# Patient Record
Sex: FEMALE | Race: OTHER | HISPANIC OR LATINO | Employment: PART TIME | URBAN - METROPOLITAN AREA
[De-identification: names, ages, dates, MRNs, and addresses within clinical notes are randomized per-mention and may not be internally consistent; named-entity substitution may affect disease eponyms.]

---

## 2019-02-15 ENCOUNTER — OFFICE VISIT (OUTPATIENT)
Dept: FAMILY MEDICINE CLINIC | Facility: CLINIC | Age: 44
End: 2019-02-15

## 2019-02-15 VITALS
SYSTOLIC BLOOD PRESSURE: 158 MMHG | HEART RATE: 84 BPM | HEIGHT: 63 IN | BODY MASS INDEX: 51.91 KG/M2 | TEMPERATURE: 98.6 F | RESPIRATION RATE: 18 BRPM | WEIGHT: 293 LBS | DIASTOLIC BLOOD PRESSURE: 98 MMHG

## 2019-02-15 DIAGNOSIS — I10 ESSENTIAL HYPERTENSION: ICD-10-CM

## 2019-02-15 DIAGNOSIS — R63.1 POLYDIPSIA: ICD-10-CM

## 2019-02-15 DIAGNOSIS — R35.0 FREQUENCY OF URINATION AND POLYURIA: ICD-10-CM

## 2019-02-15 DIAGNOSIS — R01.1 HEART MURMUR: ICD-10-CM

## 2019-02-15 DIAGNOSIS — E66.01 CLASS 3 SEVERE OBESITY DUE TO EXCESS CALORIES WITH SERIOUS COMORBIDITY AND BODY MASS INDEX (BMI) OF 50.0 TO 59.9 IN ADULT (HCC): ICD-10-CM

## 2019-02-15 DIAGNOSIS — J30.9 ALLERGIC RHINITIS, UNSPECIFIED SEASONALITY, UNSPECIFIED TRIGGER: ICD-10-CM

## 2019-02-15 DIAGNOSIS — R53.82 CHRONIC FATIGUE: ICD-10-CM

## 2019-02-15 DIAGNOSIS — R35.89 FREQUENCY OF URINATION AND POLYURIA: ICD-10-CM

## 2019-02-15 DIAGNOSIS — R06.83 SNORING: ICD-10-CM

## 2019-02-15 DIAGNOSIS — M79.89 LEG SWELLING: ICD-10-CM

## 2019-02-15 DIAGNOSIS — Z00.01 ENCOUNTER FOR WELL ADULT EXAM WITH ABNORMAL FINDINGS: Primary | ICD-10-CM

## 2019-02-15 DIAGNOSIS — Z12.39 SCREENING BREAST EXAMINATION: ICD-10-CM

## 2019-02-15 DIAGNOSIS — M72.2 PLANTAR FASCIITIS OF LEFT FOOT: ICD-10-CM

## 2019-02-15 DIAGNOSIS — L82.1 DERMATOSIS PAPULOSA NIGRA: ICD-10-CM

## 2019-02-15 LAB — SL AMB POCT GLUCOSE BLD: 174

## 2019-02-15 PROCEDURE — 82948 REAGENT STRIP/BLOOD GLUCOSE: CPT | Performed by: FAMILY MEDICINE

## 2019-02-15 PROCEDURE — 3725F SCREEN DEPRESSION PERFORMED: CPT | Performed by: FAMILY MEDICINE

## 2019-02-15 PROCEDURE — 99213 OFFICE O/P EST LOW 20 MIN: CPT | Performed by: FAMILY MEDICINE

## 2019-02-15 PROCEDURE — 99386 PREV VISIT NEW AGE 40-64: CPT | Performed by: FAMILY MEDICINE

## 2019-02-15 RX ORDER — LISINOPRIL 10 MG/1
10 TABLET ORAL DAILY
Qty: 90 TABLET | Refills: 2 | Status: SHIPPED | OUTPATIENT
Start: 2019-02-15 | End: 2019-03-15 | Stop reason: ALTCHOICE

## 2019-02-15 RX ORDER — CETIRIZINE HYDROCHLORIDE 10 MG/1
10 TABLET ORAL
Qty: 90 TABLET | Refills: 2 | Status: SHIPPED | OUTPATIENT
Start: 2019-02-15 | End: 2019-09-12 | Stop reason: SDUPTHER

## 2019-02-15 RX ORDER — FLUTICASONE PROPIONATE 50 MCG
1 SPRAY, SUSPENSION (ML) NASAL DAILY
Qty: 1 BOTTLE | Refills: 4 | Status: SHIPPED | OUTPATIENT
Start: 2019-02-15 | End: 2019-08-08 | Stop reason: SDUPTHER

## 2019-02-15 NOTE — PROGRESS NOTES
Amy Browne 1975 female MRN: [id number]    [facility] Medicine Well Adult Female Exam    ASSESSMENT/PLAN  Problem List Items Addressed This Visit        Respiratory    Allergic rhinitis     -Start flonase intranasal daily  -Start zyrtec PO QHS PRN for congestion         Relevant Medications    fluticasone (FLONASE) 50 mcg/act nasal spray    cetirizine (ZyrTEC) 10 mg tablet       Cardiovascular and Mediastinum    Essential hypertension     -Previously on lisinopril-HCTZ, currently untreated HTN  -BP today 158/98, above goal  -HTN may be related to undetected ANGE; PSG as noted below  -Will trial restart of lisinopril 10 mg QD & re-check BP at next visit  -Due to frequent urination, defer restarting HCTZ at this time  -Advised patient on symptoms of hypotension & severe HTN  -Limit salt-intake & caffeine in diet  -Discussed importance of treating HTN to prevent ASCVD, CVA         Relevant Medications    lisinopril (ZESTRIL) 10 mg tablet    Other Relevant Orders    Comprehensive metabolic panel    Lipid panel    TSH, 3rd generation with Free T4 reflex    Echo complete with contrast if indicated       Musculoskeletal and Integument    Dermatosis papulosa nigra     -Benign DPNs (SKs) over B/L cheeks, with subjective intermittent pruritus of L-sided DPNs; no abnormalities on physical exam  -Referral to Dermatology for possible electrosurgical removal/hyfrecation of irritated SKs, per patient request         Relevant Orders    Ambulatory referral to Dermatology    Plantar fasciitis of left foot     -Symptomatic with first steps in a m , tenderness to heel palpation & pain elicited on dorsiflexion suggestive of plantar fasciitis  -At risk for plantar fasciitis due to obesity, limited physical activity  -Discussed rest, shoe inserts, stretching exercises as options  -Refer to physical therapy at this time for further management  -Patient information on plantar fasciitis provided by AVS         Relevant Orders Ambulatory referral to Physical Therapy       Other    Polydipsia     -Polydipsia, polyuria, & frequent urination without dysuria in the setting of a history of pre-DM, obesity, HTN concerning for T2DM  -However, spot FSG <200 today (174), not above threshold for Dx  -Will obtain fasting glucose via CMP to further screen for T2DM  -Increased thirst may be due to irritation from post-nasal drip   -Management of allergic rhinitis via flonase and zyrtec  -Encouraged adequate fluid intake to reduce thirst sensation         Frequency of urination and polyuria     -Screening for T2DM via spot FSG negative as noted above  -Denies symptoms of dysuria, no suprapubic discomfort on exam  -Defer restarting HCTZ for HTN as noted above  -Patient is a  with  x2, denies UI but it is possible that urinary frequency is 2/2 pelvic floor dysfunction  -If symptoms persist, refer to  [facility] PT         Relevant Orders    POCT blood glucose (Completed)    Chronic fatigue     -Unclear etiology, but likely due to deconditioning  -Possible ANGE may be playing a role, PSG as noted below  -Obtain bloodwork to exclude organic causes of fatigue  -CBC to assess for anemia  -TSH to evaluate for thyroid dysfunction  -Vitamin D level         Relevant Orders    CBC and differential    TSH, 3rd generation with Free T4 reflex    Vitamin D 25 hydroxy    Snoring     -[place] Sleepiness Score WNL, but STOP-BANG score 4 today  -Mallampati III-IV on exam, concurrent HTN & morbid obesity  -Referral to Sleep Medicine for further evaluation & possible PSG         Relevant Orders    Ambulatory referral to Sleep Medicine    Leg swelling     -Possibly dependent edema due to limited physical activity due to morbid obesity, especially since it improves with elevation of legs  -Discussed compression stockings as an option, in addition to elevation of legs (above the level of the heart if possible)  -No chronic skin changes suggestive of venous insufficiency or varicose veins on exam today, but cannot exclude as etiologies  -Echo ordered as noted below to exclude CHF         Relevant Orders    Echo complete with contrast if indicated    Heart murmur     -Denies symptoms of ischemia or CHF, but admits to leg swelling  -However, 3/6 systolic murmur heard on exam today  -Loudest over LLSB, may be MVR, especially given pedal edema  -Patient has comorbid obesity and HTN  -Will obtain echo at this time to assess for valvular abnormalities         Relevant Orders    Echo complete with contrast if indicated    Class 3 severe obesity due to excess calories with serious comorbidity and body mass index (BMI) of 50 0 to 59 9 in adult [facility])     -Improve diet by limiting fat, carb, & calorie intake  -Discussed weight reduction via diet & exercise  -Will discuss consideration of referral to Bariatric Surgery at future visit, due to patient's BMI 52 75 today  -Obtain TSH w/ reflex T4  -Fasting CMP to assess FBS for T2DM screening  -Lipid panel to assess for HLD         Relevant Orders    Comprehensive metabolic panel    Lipid panel    TSH, 3rd generation with Free T4 reflex      Other Visit Diagnoses     Encounter for well adult exam with abnormal findings    -  Primary    Screening breast examination        Relevant Orders    Mammo screening bilateral w cad          Return in 1 month to discuss findings of lab & imaging studies  Future Appointments   Date Time Provider Lou Villalobos   3/15/2019  8:50 AM Stephany Pacheco MD [facility]          SUBJECTIVE  CC: Physical Exam      HPI:  Kemal Florez is a 37 y o  female who presents to establish care for an adult well exam  She moved from [place] last year and has her records with her for review today  · Pre-DM, polyuria, polydipsia: She has a hx of pre-DM with HbA1C 5 93% 7/2012  Reports frequent urination without dysuria, and increased thirst as well   Appetite is normal otherwise    · HTN/leg swelling: Hx of HTN previously taking lisinopril-HCTZ 10-12 5 mg QD but off for many months  Denies chest pain/pressure, SOBOE, orthopnea, PND, diaphoresis, vision changes, dizziness, syncope  Does have B/L pedal edema, that does worsens as the day goes on, but does seem to improve with elevation of her legs  BP today 158/98  · HM: Needs mammogram, last mammogram 12/18/15, showed R breast upper quadrant nodular density, but had US 2016, showed nodular density was BI-RADs 3 (probably benign)  12/8/15 pap smear normal cytology with high-risk HPV negative  · OBGYN hx: , children are 23 yrs, 25, yrs, & 15 yrs old; only 15 yr old in [place], others in [place]  Had tubal ligation & 1 C/S  Menses were always monthly in the past, but over past few months have been every 1-2 months, now with some cramps & more bleeding, but not with every cycle  Mother was in 45s for menopause  Unmarried, not sexually active, no contraception 2/2 BTL  · Allergy congestion: Chronic nasal congestion and sinus pressure, and hard to breathe at night, a lot of clear-colored mucus from nose    · Heel pain: L foot plantar surface, worse with the first steps in the morning, sharp pain that gets better as the morning goes on  Has not tried any interventions to date  · L sided face skin lesions: Dark DPNs over B/L cheeks and neck which are usually not bothersome to her, but on her L-sided face they can be itchy at times, despite applying lotion to her face  Review of Systems: As noted in HPI      Historical Information   The patient history was reviewed as follows:    Past Medical History:   Diagnosis Date    Hypertension      Past Surgical History:   Procedure Laterality Date     SECTION       Family History   Problem Relation Age of Onset    Hypertension Mother     Diabetes Mother     Hypertension Father     Diabetes Father     Cancer Maternal Grandmother     Diabetes Maternal Grandfather       Social History   Social History Substance and Sexual Activity   Alcohol Use Never    Frequency: Never     Social History     Substance and Sexual Activity   Drug Use Never     Social History     Tobacco Use   Smoking Status Never Smoker   Smokeless Tobacco Never Used       Medications:   No current outpatient medications on file  No Known Allergies    OBJECTIVE    Vitals:   Vitals:    02/15/19 0845   BP: 158/98   BP Location: Left arm   Patient Position: Sitting   Cuff Size: Large   Pulse: 84   Resp: 18   Temp: 98 6 °F (37 °C)   TempSrc: Tympanic   Weight: 133 kg (294 lb)   Height: 5' 2 6" (1 59 m)     Physical Exam   Constitutional: She is oriented to person, place, and time  She appears well-developed and well-nourished  No distress  HENT:   Head: Normocephalic and atraumatic  Mouth/Throat: Oropharynx is clear and moist    Mallampati III-IV, boggy nasal mucosa B/L, cobblestoning of posterior pharynx; B/L TMs normal   Eyes: Pupils are equal, round, and reactive to light  Conjunctivae and EOM are normal  Right eye exhibits no discharge  Left eye exhibits no discharge  Neck: Neck supple  No thyromegaly present  37 cm circumference   Cardiovascular: Normal rate, regular rhythm and intact distal pulses  3/6 systolic murmur heard in all areas but loudest over LLSB   Pulmonary/Chest: Effort normal and breath sounds normal    Abdominal: Soft  Bowel sounds are normal    Musculoskeletal: Normal range of motion  She exhibits edema  B/L 1+ pitting pedal edema; plantar surfaces of B/L feet grossly normal in appearance, mild tenderness over L plantar heel to palpation and pain elicited with dorsiflexion of L foot   Neurological: She is alert and oriented to person, place, and time  Skin: Capillary refill takes less than 2 seconds  Multiple hyperpigmented raised fleshy papules over B/L cheeks and neck (dermatosis papulosa nigra)   Psychiatric: She has a normal mood and affect  Her behavior is normal    Vitals reviewed       POCT blood glucose    2/15/19 10:03 AM   GLUCOSE             STOP-BANG Score: 4 (High-risk for ANGE)    [place] Sleepiness Scale Score: 6 (Unlikely excessive daytime sleepiness)        Jessi Solorzano MD, PGY-2  [address]   2/15/2019

## 2019-02-15 NOTE — PATIENT INSTRUCTIONS
Howard Castañeda, please call to schedule the Echo, mammogram, Physical Therapy, Dermatology and Sleep Medicine appointments at your earliest convenience  Go the lab for fasting bloodwork so we can test you for thyroid problems, diabetes, and check kidney and liver function  Please come back in 1 month so we can re-check your blood pressure, and go over all of the results  Plantar Fasciitis   AMBULATORY CARE:   Plantar fasciitis  is swelling of the plantar fascia  The plantar fascia is a band of fibers that connect your heel bone to the front of your foot  It helps support the arch of your foot and absorbs shock  Plantar fasciitis is caused by small tears in the plantar fascia  Over time, the tears cause swelling and irritation  Signs and symptoms:   · Pain near your heel, especially first thing in the morning    · Pain with prolonged standing, sitting, or walking    · Redness, swelling, or warmth over the injured part of your foot  Contact your healthcare provider or podiatrist if:   · Your pain and swelling increase  · You develop knee, hip, or back pain  · You have questions or concerns about your condition or care  Treatment  may include any of the following:  · Medicines  may be given to decrease swelling and pain  Steroids may be injected into your heel to decrease swelling and pain  · Shoe inserts, splints, or tape  help support your foot and decrease stress on your plantar fascia  A night splint may help stretch your plantar fascia while you sleep  · Stretches and exercises  can help decrease pain and swelling  They can also help strengthen the muscles that support your heel and foot  · Extracorporeal shockwave therapy (ESWT)  uses sound waves to decrease swelling of the plantar fascia  ESWT may be done if other treatments do not work  · Surgery  is rarely needed to separate the plantar fascia from your heel  Self-care:   · Wear your splint or shoe inserts as directed    You may need to wear a splint at night to keep your foot stretched while you sleep  This will help prevent sharp pain first thing in the morning  Shoe inserts will help decrease stress on your plantar fascia when you walk or exercise  · Rest as directed  Rest as much as possible to decrease swelling and prevent more damage  Ask your healthcare provider when you can return to your normal activities  · Apply ice on your plantar fascia  Ice helps prevent tissue damage and decreases swelling and pain  Fill a water bottle with water and freeze it  Wrap a towel around the bottle or cover it with a pillow case  Roll the water bottle under your foot for 10 minutes in the morning and after work  · Massage your plantar fascia as directed  This may help decrease swelling and pain  Roll a golf ball under your foot for 10 minutes  Repeat 3 times each day  · Go to physical therapy as directed  A physical therapist teaches you exercises to help improve movement and strength, and to decrease pain  Prevent plantar fasciitis:   · Maintain a healthy weight  This will help decrease stress on your feet  Ask your healthcare provider how much you should weigh  Ask him to help you create a weight loss plan if you are overweight  · Do low-impact exercises  Low-impact exercises decrease stress on your plantar fascia  Examples include swimming or bicycling  · Start new activities slowly  Increase the intensity and time gradually  · Wear shoes that fit well and support your arch  Replace your shoes before the padding or shock absorption wears out  Do not walk or  bare feet or sandals for long periods of time  · Stretch before you exercise  Ask your healthcare provider how to stretch your plantar fascia and calf muscles  Follow up with your healthcare provider or podiatrist as directed:  Write down your questions so you remember to ask them during your visits     © 2017 [employer] Information is for End User's use only and may not be sold, redistributed or otherwise used for commercial purposes  All illustrations and images included in CareNotes® are the copyrighted property of [employer]  or [employer]  The above information is an  only  It is not intended as medical advice for individual conditions or treatments  Talk to your doctor, nurse or pharmacist before following any medical regimen to see if it is safe and effective for you  Sleep Apnea   AMBULATORY CARE:   Sleep apnea  is also called obstructive sleep apnea  It is a condition that causes you to stop breathing for 10 seconds or more while you are sleeping  During normal sleep, your throat is kept open by muscles that let air pass through easily  Sleep apnea causes the muscles and tissues around your throat to relax and block air from passing through  Sleep apnea may happen many times while you are asleep  Common symptoms include the following:   · No signs of breathing for 10 seconds or more while you sleep    · Snoring loudly, snorting, gasping or choking while you sleep, and waking up suddenly because of these    · A hard time thinking, remembering things, or focusing on your tasks the following day    · Headache or nausea    · Bedwetting or waking up often during the night to urinate    · Feeling sleepy, slow, and tired during the day  Seek care immediately if:   · You have chest pain or trouble breathing  Contact your healthcare provider if:   · You feel tired or depressed  · You have trouble staying awake during the day  · You have trouble thinking clearly  · You have questions or concerns about your condition or care  Treatment for sleep apnea  includes using a continuous positive airway pressure (CPAP) machine to keep your airway open during sleep  A mask is placed over your nose and mouth, or just your nose   The mask is hooked to the CPAP machine that blows a gentle stream of air into the mask when you breathe  This helps keep your airway open so you can breathe more regularly  Extra oxygen may be given to you through the machine  You may be given a mouth device  It looks like a mouth guard or dental retainer and stops your tongue and mouth tissues from blocking your throat while you sleep  Surgery may be needed to remove extra tissues that block your mouth, throat, or nose  Manage sleep apnea:   · Do not smoke  Nicotine and other chemicals in cigarettes and cigars can cause lung damage  Ask your healthcare provider for information if you currently smoke and need help to quit  E-cigarettes or smokeless tobacco still contain nicotine  Talk to your healthcare provider before you use these products  · Do not drink alcohol or take sedative medicine before you go to sleep  Alcohol and sedatives can relax the muscles and tissues around your throat  This can block the airflow to your lungs  · Maintain a healthy weight  Excess tissue around your throat may restrict your breathing  Ask your healthcare provider for information if you need to lose weight  · Sleep on your side or use pillows designed to prevent sleep apnea  This prevents your tongue or other tissues from blocking your throat  You can also raise the head of your bed  Follow up with your healthcare provider as directed:  Write down your questions so you remember to ask them during your visits  © 2017 [address] [employer] Information is for End User's use only and may not be sold, redistributed or otherwise used for commercial purposes  All illustrations and images included in CareNotes® are the copyrighted property of [employer] , [employer]  or [employer]  The above information is an  only  It is not intended as medical advice for individual conditions or treatments  Talk to your doctor, nurse or pharmacist before following any medical regimen to see if it is safe and effective for you

## 2019-02-16 PROBLEM — E66.813 CLASS 3 SEVERE OBESITY DUE TO EXCESS CALORIES WITH SERIOUS COMORBIDITY AND BODY MASS INDEX (BMI) OF 50.0 TO 59.9 IN ADULT (HCC): Status: ACTIVE | Noted: 2019-02-16

## 2019-02-16 PROBLEM — L82.1 DERMATOSIS PAPULOSA NIGRA: Status: ACTIVE | Noted: 2019-02-16

## 2019-02-16 PROBLEM — R01.1 HEART MURMUR: Status: ACTIVE | Noted: 2019-02-16

## 2019-02-16 PROBLEM — I10 ESSENTIAL HYPERTENSION: Status: ACTIVE | Noted: 2019-02-16

## 2019-02-16 PROBLEM — R06.83 SNORING: Status: ACTIVE | Noted: 2019-02-16

## 2019-02-16 PROBLEM — R53.82 CHRONIC FATIGUE: Status: ACTIVE | Noted: 2019-02-16

## 2019-02-16 PROBLEM — M72.2 PLANTAR FASCIITIS OF LEFT FOOT: Status: ACTIVE | Noted: 2019-02-16

## 2019-02-16 PROBLEM — M79.89 LEG SWELLING: Status: ACTIVE | Noted: 2019-02-16

## 2019-02-16 PROBLEM — J30.9 ALLERGIC RHINITIS: Status: ACTIVE | Noted: 2019-02-16

## 2019-02-16 PROBLEM — E66.01 CLASS 3 SEVERE OBESITY DUE TO EXCESS CALORIES WITH SERIOUS COMORBIDITY AND BODY MASS INDEX (BMI) OF 50.0 TO 59.9 IN ADULT (HCC): Status: ACTIVE | Noted: 2019-02-16

## 2019-02-16 NOTE — ASSESSMENT & PLAN NOTE
-Improve diet by limiting fat, carb, & calorie intake  -Discussed weight reduction via diet & exercise  -Will discuss consideration of referral to Bariatric Surgery at future visit, due to patient's BMI 52 75 today  -Obtain TSH w/ reflex T4  -Fasting CMP to assess FBS for T2DM screening  -Lipid panel to assess for HLD

## 2019-02-16 NOTE — ASSESSMENT & PLAN NOTE
-Unclear etiology, but likely due to deconditioning  -Possible ANGE may be playing a role, PSG as noted below  -Obtain bloodwork to exclude organic causes of fatigue  -CBC to assess for anemia  -TSH to evaluate for thyroid dysfunction  -Vitamin D level

## 2019-02-16 NOTE — ASSESSMENT & PLAN NOTE
-Possibly dependent edema due to limited physical activity due to morbid obesity, especially since it improves with elevation of legs  -Discussed compression stockings as an option, in addition to elevation of legs (above the level of the heart if possible)  -No chronic skin changes suggestive of venous insufficiency or varicose veins on exam today, but cannot exclude as etiologies  -Echo ordered as noted below to exclude CHF

## 2019-02-16 NOTE — ASSESSMENT & PLAN NOTE
-Symptomatic with first steps in a m , tenderness to heel palpation & pain elicited on dorsiflexion suggestive of plantar fasciitis  -At risk for plantar fasciitis due to obesity, limited physical activity  -Discussed rest, shoe inserts, stretching exercises as options  -Refer to physical therapy at this time for further management  -Patient information on plantar fasciitis provided by KRYSTIN

## 2019-02-16 NOTE — ASSESSMENT & PLAN NOTE
-Screening for T2DM via spot FSG negative as noted above  -Denies symptoms of dysuria, no suprapubic discomfort on exam  -Defer restarting HCTZ for HTN as noted above  -Patient is a  with  x2, denies UI but it is possible that urinary frequency is 2/2 pelvic floor dysfunction  -If symptoms persist, refer to  Women's Pel[facility] PT

## 2019-02-16 NOTE — ASSESSMENT & PLAN NOTE
-Previously on lisinopril-HCTZ, currently untreated HTN  -BP today 158/98, above goal  -HTN may be related to undetected ANGE; PSG as noted below  -Will trial restart of lisinopril 10 mg QD & re-check BP at next visit  -Due to frequent urination, defer restarting HCTZ at this time  -Advised patient on symptoms of hypotension & severe HTN  -Limit salt-intake & caffeine in diet  -Discussed importance of treating HTN to prevent ASCVD, CVA

## 2019-02-16 NOTE — ASSESSMENT & PLAN NOTE
-Polydipsia, polyuria, & frequent urination without dysuria in the setting of a history of pre-DM, obesity, HTN concerning for T2DM  -However, spot FSG <200 today (174), not above threshold for Dx  -Will obtain fasting glucose via CMP to further screen for T2DM  -Increased thirst may be due to irritation from post-nasal drip   -Management of allergic rhinitis via flonase and zyrtec  -Encouraged adequate fluid intake to reduce thirst sensation

## 2019-02-16 NOTE — ASSESSMENT & PLAN NOTE
-Denies symptoms of ischemia or CHF, but admits to leg swelling  -However, 3/6 systolic murmur heard on exam today  -Loudest over LLSB, may be MVR, especially given pedal edema  -Patient has comorbid obesity and HTN  -Will obtain echo at this time to assess for valvular abnormalities

## 2019-02-16 NOTE — ASSESSMENT & PLAN NOTE
-Benign DPNs (SKs) over B/L cheeks, with subjective intermittent pruritus of L-sided DPNs; no abnormalities on physical exam  -Referral to Dermatology for possible electrosurgical removal/hyfrecation of irritated SKs, per patient request

## 2019-02-18 ENCOUNTER — APPOINTMENT (OUTPATIENT)
Dept: LAB | Age: 44
End: 2019-02-18
Payer: COMMERCIAL

## 2019-02-18 DIAGNOSIS — R53.82 CHRONIC FATIGUE: ICD-10-CM

## 2019-02-18 DIAGNOSIS — I10 ESSENTIAL HYPERTENSION: ICD-10-CM

## 2019-02-18 DIAGNOSIS — E66.01 CLASS 3 SEVERE OBESITY DUE TO EXCESS CALORIES WITH SERIOUS COMORBIDITY AND BODY MASS INDEX (BMI) OF 50.0 TO 59.9 IN ADULT (HCC): ICD-10-CM

## 2019-02-18 LAB
25(OH)D3 SERPL-MCNC: 13.8 NG/ML (ref 30–100)
ALBUMIN SERPL BCP-MCNC: 3.1 G/DL (ref 3.5–5)
ALP SERPL-CCNC: 76 U/L (ref 46–116)
ALT SERPL W P-5'-P-CCNC: 21 U/L (ref 12–78)
ANION GAP SERPL CALCULATED.3IONS-SCNC: 6 MMOL/L (ref 4–13)
AST SERPL W P-5'-P-CCNC: 13 U/L (ref 5–45)
BASOPHILS # BLD AUTO: 0.04 THOUSANDS/ΜL (ref 0–0.1)
BASOPHILS NFR BLD AUTO: 0 % (ref 0–1)
BILIRUB SERPL-MCNC: 0.42 MG/DL (ref 0.2–1)
BUN SERPL-MCNC: 9 MG/DL (ref 5–25)
CALCIUM SERPL-MCNC: 8.7 MG/DL (ref 8.3–10.1)
CHLORIDE SERPL-SCNC: 103 MMOL/L (ref 100–108)
CHOLEST SERPL-MCNC: 165 MG/DL (ref 50–200)
CO2 SERPL-SCNC: 29 MMOL/L (ref 21–32)
CREAT SERPL-MCNC: 0.51 MG/DL (ref 0.6–1.3)
EOSINOPHIL # BLD AUTO: 0.14 THOUSAND/ΜL (ref 0–0.61)
EOSINOPHIL NFR BLD AUTO: 1 % (ref 0–6)
ERYTHROCYTE [DISTWIDTH] IN BLOOD BY AUTOMATED COUNT: 15.1 % (ref 11.6–15.1)
GFR SERPL CREATININE-BSD FRML MDRD: 116 ML/MIN/1.73SQ M
GLUCOSE P FAST SERPL-MCNC: 121 MG/DL (ref 65–99)
HCT VFR BLD AUTO: 39.2 % (ref 34.8–46.1)
HDLC SERPL-MCNC: 37 MG/DL (ref 40–60)
HGB BLD-MCNC: 11.7 G/DL (ref 11.5–15.4)
IMM GRANULOCYTES # BLD AUTO: 0.02 THOUSAND/UL (ref 0–0.2)
IMM GRANULOCYTES NFR BLD AUTO: 0 % (ref 0–2)
LDLC SERPL CALC-MCNC: 100 MG/DL (ref 0–100)
LYMPHOCYTES # BLD AUTO: 2.49 THOUSANDS/ΜL (ref 0.6–4.47)
LYMPHOCYTES NFR BLD AUTO: 24 % (ref 14–44)
MCH RBC QN AUTO: 25.9 PG (ref 26.8–34.3)
MCHC RBC AUTO-ENTMCNC: 29.8 G/DL (ref 31.4–37.4)
MCV RBC AUTO: 87 FL (ref 82–98)
MONOCYTES # BLD AUTO: 0.46 THOUSAND/ΜL (ref 0.17–1.22)
MONOCYTES NFR BLD AUTO: 5 % (ref 4–12)
NEUTROPHILS # BLD AUTO: 7.11 THOUSANDS/ΜL (ref 1.85–7.62)
NEUTS SEG NFR BLD AUTO: 70 % (ref 43–75)
NONHDLC SERPL-MCNC: 128 MG/DL
NRBC BLD AUTO-RTO: 0 /100 WBCS
PLATELET # BLD AUTO: 291 THOUSANDS/UL (ref 149–390)
PMV BLD AUTO: 11.8 FL (ref 8.9–12.7)
POTASSIUM SERPL-SCNC: 4 MMOL/L (ref 3.5–5.3)
PROT SERPL-MCNC: 8.2 G/DL (ref 6.4–8.2)
RBC # BLD AUTO: 4.52 MILLION/UL (ref 3.81–5.12)
SODIUM SERPL-SCNC: 138 MMOL/L (ref 136–145)
TRIGL SERPL-MCNC: 142 MG/DL
TSH SERPL DL<=0.05 MIU/L-ACNC: 1.29 UIU/ML (ref 0.36–3.74)
WBC # BLD AUTO: 10.26 THOUSAND/UL (ref 4.31–10.16)

## 2019-02-18 PROCEDURE — 36415 COLL VENOUS BLD VENIPUNCTURE: CPT

## 2019-02-18 PROCEDURE — 80053 COMPREHEN METABOLIC PANEL: CPT

## 2019-02-18 PROCEDURE — 80061 LIPID PANEL: CPT

## 2019-02-18 PROCEDURE — 85025 COMPLETE CBC W/AUTO DIFF WBC: CPT

## 2019-02-18 PROCEDURE — 84443 ASSAY THYROID STIM HORMONE: CPT

## 2019-02-18 PROCEDURE — 82306 VITAMIN D 25 HYDROXY: CPT

## 2019-03-15 ENCOUNTER — OFFICE VISIT (OUTPATIENT)
Dept: FAMILY MEDICINE CLINIC | Facility: CLINIC | Age: 44
End: 2019-03-15

## 2019-03-15 VITALS
TEMPERATURE: 98.9 F | SYSTOLIC BLOOD PRESSURE: 160 MMHG | WEIGHT: 293 LBS | HEART RATE: 72 BPM | RESPIRATION RATE: 16 BRPM | DIASTOLIC BLOOD PRESSURE: 100 MMHG | HEIGHT: 63 IN | BODY MASS INDEX: 51.91 KG/M2

## 2019-03-15 DIAGNOSIS — I10 ESSENTIAL HYPERTENSION: ICD-10-CM

## 2019-03-15 DIAGNOSIS — K21.9 CHRONIC GERD: ICD-10-CM

## 2019-03-15 DIAGNOSIS — M79.89 LEG SWELLING: ICD-10-CM

## 2019-03-15 DIAGNOSIS — E11.9 TYPE 2 DIABETES MELLITUS WITHOUT COMPLICATION, WITHOUT LONG-TERM CURRENT USE OF INSULIN (HCC): Primary | ICD-10-CM

## 2019-03-15 LAB — SL AMB POCT HEMOGLOBIN AIC: 6.5 (ref ?–6.5)

## 2019-03-15 PROCEDURE — 99213 OFFICE O/P EST LOW 20 MIN: CPT | Performed by: FAMILY MEDICINE

## 2019-03-15 PROCEDURE — 83036 HEMOGLOBIN GLYCOSYLATED A1C: CPT | Performed by: FAMILY MEDICINE

## 2019-03-15 RX ORDER — RANITIDINE 150 MG/1
150 TABLET ORAL 2 TIMES DAILY
Qty: 180 TABLET | Refills: 1 | Status: SHIPPED | OUTPATIENT
Start: 2019-03-15 | End: 2019-09-05 | Stop reason: SDUPTHER

## 2019-03-15 RX ORDER — LISINOPRIL AND HYDROCHLOROTHIAZIDE 12.5; 1 MG/1; MG/1
1 TABLET ORAL DAILY
Qty: 90 TABLET | Refills: 2 | Status: SHIPPED | OUTPATIENT
Start: 2019-03-15 | End: 2019-09-12 | Stop reason: SDUPTHER

## 2019-03-15 NOTE — PATIENT INSTRUCTIONS
Start metformin 500 mg once daily for 2 weeks, and if you tolerate it without belly upset, increase to 500 mg twice daily for 4 weeks, and let me know if you have any problems with it  Please measure your blood pressure and record it on a paper to be reviewed in 1 month at the next visit  When I get your echo results, I will call you and discuss further  Please try to elevate your legs above your heart at the end of the day to reduce the swelling  I have sent the new blood pressure medication to your pharmacy, make sure you stay hydrated due to the HCTZ increasing urination  I have also sent zantac, which you can take twice a day as needed  -Try these Anti-reflux measures:    -Raise the head of the bed 4 to 6 inches   -Avoid excess coffee, tea or other caffeinated beverages   -Avoid spicy or acidic foods, avoid peppermint, chocolate   -Minimize eating within 2 hours of bedtime   -Avoid garments that fit tightly through the abdomen    Type 2 Diabetes in Adults   AMBULATORY CARE:   Type 2 diabetes  is a disease that affects how your body uses glucose (sugar)  Normally, when the blood sugar level increases, the pancreas makes more insulin  Insulin helps move sugar out of the blood so it can be used for energy  Type 2 diabetes develops because either the body cannot make enough insulin, or it cannot use the insulin correctly  After many years, your pancreas may stop making insulin     Common symptoms include the following:   · More hunger or thirst than usual     · Frequent urination     · Weight loss without trying     · Blurred vision  Call 911 if you have any of the following:   · You have any of the following signs of a stroke:      ¨ Numbness or drooping on one side of your face     ¨ Weakness in an arm or leg    ¨ Confusion or difficulty speaking    ¨ Dizziness, a severe headache, or vision loss    · You have any of the following signs of a heart attack:      ¨ Squeezing, pressure, or pain in your chest that lasts longer than 5 minutes or returns    ¨ Discomfort or pain in your back, neck, jaw, stomach, or arm     ¨ Trouble breathing    ¨ Nausea or vomiting    ¨ Lightheadedness or a sudden cold sweat, especially with chest pain or trouble breathing  Seek care immediately if:   · You have severe abdominal pain, or the pain spreads to your back  You may also be vomiting  · You have trouble staying awake or focusing  · You are shaking or sweating  · You have blurred or double vision  · Your breath has a fruity, sweet smell  · Your breathing is deep and labored, or rapid and shallow  · Your heartbeat is fast and weak  Contact your healthcare provider if:   · You are vomiting or have diarrhea  · You have an upset stomach and cannot eat the foods on your meal plan  · You feel weak or more tired than usual      · You feel dizzy, have headaches, or are easily irritated  · Your skin is red, warm, dry, or swollen  · You have a wound that does not heal      · You have numbness in your arms or legs  · You have trouble coping with your illness, or you feel anxious or depressed  · You have questions or concerns about your condition or care  Treatment for type 2 diabetes  includes keeping your blood sugar at a normal level  You must eat the right foods, and exercise regularly  You may need medicine if you cannot control your blood sugar level with nutrition and exercise  You may also need medicine to prevent heart disease, a complication of type 2 diabetes  You may  need any of the following:  · Hypoglycemic medicines or insulin  may be given to decrease the amount of sugar in your blood  · Blood pressure medicine  may be given to lower your blood pressure  Your blood pressure should be less than 140/90  · Cholesterol lowering medicine  may be given to prevent heart disease  · Antiplatelets , such as aspirin, help prevent blood clots   Take your antiplatelet medicine exactly as directed  These medicines make it more likely for you to bleed or bruise  If you are told to take aspirin, do not take acetaminophen or ibuprofen instead  · Take your medicine as directed  Contact your healthcare provider if you think your medicine is not helping or if you have side effects  Tell him or her if you are allergic to any medicine  Keep a list of the medicines, vitamins, and herbs you take  Include the amounts, and when and why you take them  Bring the list or the pill bottles to follow-up visits  Carry your medicine list with you in case of an emergency  Check your blood sugar level: You will be taught how to check a small drop of blood in a glucose monitor  You will need to check your blood sugar level at least 3 times each day if you are on insulin  Ask your healthcare provider when and how often to check during the day  If you check your blood sugar level before a meal , it should be between 80 and 130 mg/dL  If you check your blood sugar level 1 to 2 hours after a meal , it should be less than 180 mg/dL  Ask your healthcare provider if these are good goals for you  Write down your results, and show them to your healthcare provider  He may use the results to make changes to your medicine, food, and exercise schedules  If your blood sugar level is too low: Your blood sugar level is too low if it goes below 70 mg/dL  If the level is too low, eat or drink 15 grams of fast-acting carbohydrate  These are found naturally in fruits  Fast-acting carbohydrates will raise your blood sugar level quickly  Examples of 15 grams of fast-acting carbohydrate are 4 ounces (½ cup) of fruit juice or 4 ounces of regular soda  Other examples are 2 tablespoons of raisins or 3 to 4 glucose tablets  Check your blood sugar level 15 minutes later  If the level is still low (less than 100 mg/dL), eat another 15 grams of carbohydrate   When the level returns to 100 mg/dL, eat a snack or meal that contains carbohydrates  This will help prevent another drop in blood sugar  Always carefully follow your healthcare provider's instructions on how to treat low blood sugar levels  Check your feet each day for sores:  Wear shoes and socks that fit correctly  Do not trim your toenails  Ask your healthcare provider for more information about foot care  Follow your meal plan:  A dietitian will help you make a meal plan to keep your blood sugar level steady  Do not skip meals  Your blood sugar level may drop too low if you have taken diabetes medicine and do not eat  · Keep track of carbohydrates (sugar and starchy foods)  Your blood sugar level can get too high if you eat too many carbohydrates  Your dietitian will help you plan meals and snacks that have the right amount of carbohydrates  · Eat low-fat foods , such as skinless chicken and low-fat milk  · Eat less sodium (salt)  Limit high-sodium foods, such as soy sauce, potato chips, and soup  Do not add salt to food you cook  Limit your use of table salt  You should have less than 2,300 mg of sodium per day  · Eat high-fiber foods , such as vegetables, whole grain breads, and beans  · Limit alcohol  Alcohol affects your blood sugar level and can make it harder to manage your diabetes  Limit alcohol to 1 drink a day if you are a woman  Limit alcohol to 2 drinks a day if you are a man  A drink of alcohol is 12 ounces of beer, 5 ounces of wine, or 1½ ounces of liquor  Maintain a healthy weight:  Ask your healthcare provider how much you should weigh  A healthy weight can help you control your diabetes  Ask your provider to help you create a weight loss plan if you are overweight  Together you can set manageable weight loss goals  Exercise as directed:  Exercise can help keep your blood sugar level steady, decrease your risk of heart disease, and help you lose weight  Stretch before and after you exercise   Exercise for at least 150 minutes every week  Spread this amount of exercise over at least 3 days a week  Do not skip exercise more than 2 days in a row  Include muscle strengthening activities 2 to 3 days each week  Older adults should include balance training 2 to 3 times each week  Activities that help increase balance include yoga and emily chi  Work with your healthcare provider to create an exercise plan  · Check your blood sugar level before and after exercise  Healthcare providers may tell you to change the amount of insulin you take or food you eat  If your blood sugar level is high, check your blood or urine for ketones before you exercise  Do not exercise if your blood sugar level is high and you have ketones  · If your blood sugar level is less than 100 mg/dL, have a carbohydrate snack before you exercise  Examples are 4 to 6 crackers, ½ banana, 8 ounces (1 cup) of milk, or 4 ounces (½ cup) of juice  Drink water or liquids that do not contain sugar before, during, and after exercise  Ask your dietitian or healthcare provider which liquids you should drink when you exercise  · Do not sit for longer than 30 minutes  If you cannot walk around, at least stand up  This will help you stay active and keep your blood circulating  Do not smoke:  Nicotine and other chemicals in cigarettes and cigars can cause lung damage and make it more difficult to manage your diabetes  Ask your healthcare provider for information if you currently smoke and need help to quit  Do not use e-cigarettes or smokeless tobacco in place of cigarettes or to help you quit  They still contain nicotine  Check your blood pressure as directed:  Ask your healthcare provider what your blood pressure should be  Most adults with diabetes and high blood pressure should have a systolic blood pressure (first number) less than 140  Your diastolic blood pressure (second number) should be less than 90     Wear medical alert identification:  Wear medical alert jewelry or carry a card that says you have diabetes  Ask your healthcare provider where to get these items  Ask about vaccines: You have a higher risk for serious illness if you get the flu, pneumonia, or hepatitis  Ask your healthcare provider if you should get a flu, pneumonia, or hepatitis B vaccine, and when to get the vaccine  Follow up with your healthcare provider as directed: You may need to return to have your A1c checked every 3 months  You will need to return at least once each year to have your feet checked  You will need an eye exam once a year to check for retinopathy  You will also need urine tests every year to check for kidney problems  You may need tests to monitor for heart disease such as an EKG, stress test, blood pressure monitoring, and blood tests  Write down your questions so you remember to ask them during your visits  © 2017 [employer] Information is for End User's use only and may not be sold, redistributed or otherwise used for commercial purposes  All illustrations and images included in CareNotes® are the copyrighted property of [employer]  or [employer]  The above information is an  only  It is not intended as medical advice for individual conditions or treatments  Talk to your doctor, nurse or pharmacist before following any medical regimen to see if it is safe and effective for you

## 2019-03-15 NOTE — ASSESSMENT & PLAN NOTE
Lab Results   Component Value Date    HGBA1C 6 5 03/15/2019   -New diagnosis of diabetes discovered today after A1c obtained 2/2 elevated FSG (121)  -Start metformin 500 mg, but with slow titration to optimize tolerance and reduce risk of GI side effects  -Start metformin 500 mg 1 tablet QD x2 weeks, then titrate up to metformin 500 mg BID x1 month  -Goal of metformin will be 1000 mg BID, but will assess tolerance in 6 weeks at BP check visit  -Will start statin in future visit once HTN better controlled, to calculated ASCVD for strength of statin  -Obtain urine microalbumin, foot exam, Ophthalmology referral at next visit  -Discussed limiting carbohydrates in diet, weight reduction efforts, increased exercise   -Patient declines referral for medical weight management/bariatrics today

## 2019-03-15 NOTE — PROGRESS NOTES
Alyssa Cee 1975 female MRN: [id number]    Family Medicine Follow-up Visit    ASSESSMENT/PLAN  Problem List Items Addressed This Visit        Digestive    Chronic GERD     -Restarted previously prescribed (in [place]) zantac today  -Discussed Anti-reflux measures:    -Raise the head of the bed 4 to 6 inches   -Avoid excess coffee, tea or other caffeinated beverages   -Avoid spicy or acidic foods, avoid peppermint, chocolate   -Minimize eating within 2 hours of bedtime   -Avoid garments that fit tightly through the abdomen         Relevant Medications    ranitidine (ZANTAC) 150 mg tablet       Endocrine    Type 2 diabetes mellitus without complication, without long-term current use of insulin ([facility]) - Primary     Lab Results   Component Value Date    HGBA1C 6 5 03/15/2019   -New diagnosis of diabetes discovered today after A1c obtained 2/2 elevated FSG (121)  -Start metformin 500 mg, but with slow titration to optimize tolerance and reduce risk of GI side effects  -Start metformin 500 mg 1 tablet QD x2 weeks, then titrate up to metformin 500 mg BID x1 month  -Goal of metformin will be 1000 mg BID, but will assess tolerance in 6 weeks at BP check visit  -Will start statin in future visit once HTN better controlled, to calculated ASCVD for strength of statin  -Obtain urine microalbumin, foot exam, Ophthalmology referral at next visit  -Discussed limiting carbohydrates in diet, weight reduction efforts, increased exercise   -Patient declines referral for medical weight management/bariatrics today         Relevant Medications    metFORMIN (GLUCOPHAGE) 500 mg tablet    Other Relevant Orders    POCT hemoglobin A1c (Completed)       Cardiovascular and Mediastinum    Essential hypertension     -Patient still having severe-range BP despite starting lisinopril at last visit  -Patient checks BP daily (her daughter is a nurse) and SBP running 150s-160s usually  -Will add back on HCTZ to lisinopril, which patient was taking back in [place] per records  -HCTZ may also help with patient's B/L LE swelling  -Advised patient to record daily BP and bring log for review in 1 month  -Advised patient on symptoms of hypotension & severe HTN  -Limit salt-intake & caffeine in diet, minimize stress level, weight reduction  -DASH diet handout given via AVS   -Discussed importance of treating HTN to prevent ASCVD, CVA         Relevant Medications    lisinopril-hydrochlorothiazide (PRINZIDE,ZESTORETIC) 10-12 5 MG per tablet       Other    Leg swelling     -As noted at previous visit, appears to be dependent edema, as it is non-pitting and improves overnight  -However, concern for cardiac etiology 2/2 murmur on exam, possible MVR; echo scheduled 3/22/19  -HCTZ added back on for HTN, which may help with patient's swelling  -Discussed elevating legs above level of heart at the end of the day  -Also discussed compression stockings, which patient would like to defer for now  -Advised on limiting salt in diet to prevent excessive fluid retention               Follow up in 1 month for BP check  Future Appointments   Date Time Provider Lou Villalobos   3/22/2019 10:00 AM BE [facility] ECHO 2 [facility] Car [address]   3/27/2019  1:30 PM Madhavi Gardner MD [facility] Sleep Med [facility] SLEEP [facility]   4/22/2019  3:40 PM Cosme Carreno MD [facility] STAR Lizz Odell          SUBJECTIVE  CC: Follow-up      HPI:  Gui Felton is a 37 y o  female who presents for follow up to discuss lab results as well as:    · Labs: FSG elevated at 121, but CMP otherwise WNL, TSH WNL (1 290), Vit D 13 8 (low), but CBC & lipid panel WNL  Patient was on metformin in [place] for pre-diabetes, tolerated well per patient, and she is amenable to T2DM testing today with the possibility of restarting metformin if indicated      · HTN: Restarted on lisinopril at last visit (was on lisinopril-HCTZ in [place] but HCTZ held at last visit due to patient request 2/2 frequent urination), but BP still 160/100 today, and she is open to restarting HCTZ at this time  · GERD: Hx of GERD, was on zantac and prilosec in the past, which worked well  Symptoms are worse at night, and patient says she does not eat close to bedtime or eat spicy or acidic foods  Also denies excessive caffeine use  Asks to restart treatment  · Leg swelling/fatigue: Patient having more swelling in her legs but has been working more lately, which requires her to be on her feet for prolonged periods of time  Says she has not been elevating her feet above the level of her heart has advised that previous visit  Denies any chest pain or shortness of breath  Echo is scheduled 3/22/19, Sleep Medicine consult scheduled 3/27/19    Review of Systems   Constitutional: Positive for fatigue  Negative for chills and fever  Eyes: Negative for visual disturbance  Went to eye doctor and got glasses since last visit   Respiratory: Negative for chest tightness and shortness of breath  Cardiovascular: Positive for leg swelling  Negative for chest pain and palpitations  Gastrointestinal: Negative for abdominal pain, diarrhea, nausea and vomiting  Endocrine: Positive for polydipsia and polyuria  Negative for polyphagia  Genitourinary: Negative for dysuria  Musculoskeletal: Negative for gait problem  Skin: Negative for rash  Neurological: Negative for syncope, weakness and light-headedness  Psychiatric/Behavioral: Negative for agitation, sleep disturbance and suicidal ideas  All other systems reviewed and are negative        Historical Information   The patient history was reviewed as follows:    Past Medical History:   Diagnosis Date    Hypertension     Pre-diabetes      Past Surgical History:   Procedure Laterality Date     SECTION      TUBAL LIGATION       Family History   Problem Relation Age of Onset    Hypertension Mother     Diabetes Mother     Hypertension Father     Diabetes Father     Cancer Maternal Grandmother     Diabetes Maternal Grandfather Social History   Social History     Substance and Sexual Activity   Alcohol Use Never    Frequency: Never     Social History     Substance and Sexual Activity   Drug Use Never     Social History     Tobacco Use   Smoking Status Never Smoker   Smokeless Tobacco Never Used       Medications:     Current Outpatient Medications:     cetirizine (ZyrTEC) 10 mg tablet, Take 1 tablet (10 mg total) by mouth daily at bedtime as needed for allergies for up to 90 days, Disp: 90 tablet, Rfl: 2    fluticasone (FLONASE) 50 mcg/act nasal spray, 1 spray into each nostril daily, Disp: 1 Bottle, Rfl: 4    lisinopril (ZESTRIL) 10 mg tablet, Take 1 tablet (10 mg total) by mouth daily for 90 days, Disp: 90 tablet, Rfl: 2  No Known Allergies    OBJECTIVE    Vitals:   Vitals:    03/15/19 0901   BP: 160/100   BP Location: Left arm   Patient Position: Sitting   Cuff Size: Large   Pulse: 72   Resp: 16   Temp: 98 9 °F (37 2 °C)   TempSrc: Tympanic   Weight: 135 kg (297 lb 6 4 oz)   Height: 5' 2 6" (1 59 m)       Physical Exam   Constitutional: She is oriented to person, place, and time  She appears well-developed and well-nourished  No distress  HENT:   Head: Normocephalic and atraumatic  Eyes: Pupils are equal, round, and reactive to light  Conjunctivae and EOM are normal  Right eye exhibits no discharge  Left eye exhibits no discharge  Neck: Normal range of motion  Neck supple  No thyromegaly present  Cardiovascular: Normal rate, regular rhythm and intact distal pulses  Murmur heard  3/6 IVAN   Pulmonary/Chest: Effort normal and breath sounds normal  No respiratory distress  Abdominal: Soft  Bowel sounds are normal  She exhibits no distension  There is no tenderness  Musculoskeletal: Normal range of motion  She exhibits edema  She exhibits no tenderness  1+ nonpitting edema of B/L feet and ankles   Neurological: She is alert and oriented to person, place, and time  Skin: Skin is warm and dry  No rash noted  Psychiatric: She has a normal mood and affect  Her behavior is normal    Nursing note and vitals reviewed         POCT hemoglobin A1c    Ref Range & Units 3/15/19  9:39 AM   Hemoglobin A1C 6 5 6 5              Butch Carter MD, PGY-2  [facility]   3/15/2019

## 2019-03-15 NOTE — LETTER
March 15, 2019     Patient: Martina Zaman   YOB: 1975   Date of Visit: 3/15/2019       To Whom it May Concern: Martina Zaman is under my professional care  She was seen in my office on 3/15/2019  She has been diagnosed with T2DM today with HbA1C 6 5%, and started on metformin 500 mg QD to be titrated up to BID after 2 weeks  Your care of this patient is greatly appreciated  If you have any questions or concerns, please don't hesitate to call           Sincerely,          Jackie Caballero MD

## 2019-03-16 NOTE — ASSESSMENT & PLAN NOTE
-As noted at previous visit, appears to be dependent edema, as it is non-pitting and improves overnight  -However, concern for cardiac etiology 2/2 murmur on exam, possible MVR; echo scheduled 3/22/19  -HCTZ added back on for HTN, which may help with patient's swelling  -Discussed elevating legs above level of heart at the end of the day  -Also discussed compression stockings, which patient would like to defer for now  -Advised on limiting salt in diet to prevent excessive fluid retention

## 2019-03-16 NOTE — ASSESSMENT & PLAN NOTE
-Restarted previously prescribed (in [place]) zantac today  -Discussed Anti-reflux measures:    -Raise the head of the bed 4 to 6 inches   -Avoid excess coffee, tea or other caffeinated beverages   -Avoid spicy or acidic foods, avoid peppermint, chocolate   -Minimize eating within 2 hours of bedtime   -Avoid garments that fit tightly through the abdomen

## 2019-03-16 NOTE — ASSESSMENT & PLAN NOTE
-Patient still having severe-range BP despite starting lisinopril at last visit  -Patient checks BP daily (her daughter is a nurse) and SBP running 150s-160s usually  -Will add back on HCTZ to lisinopril, which patient was taking back in [place] per records  -HCTZ may also help with patient's B/L LE swelling  -Advised patient to record daily BP and bring log for review in 1 month  -Advised patient on symptoms of hypotension & severe HTN  -Limit salt-intake & caffeine in diet, minimize stress level, weight reduction  -DASH diet handout given via AVS   -Discussed importance of treating HTN to prevent ASCVD, CVA

## 2019-03-22 ENCOUNTER — HOSPITAL ENCOUNTER (OUTPATIENT)
Dept: NON INVASIVE DIAGNOSTICS | Facility: CLINIC | Age: 44
Discharge: HOME | End: 2019-03-22
Payer: COMMERCIAL

## 2019-03-22 DIAGNOSIS — R01.1 HEART MURMUR: ICD-10-CM

## 2019-03-22 DIAGNOSIS — I10 ESSENTIAL HYPERTENSION: ICD-10-CM

## 2019-03-22 DIAGNOSIS — M79.89 LEG SWELLING: ICD-10-CM

## 2019-03-22 PROCEDURE — 93306 TTE W/DOPPLER COMPLETE: CPT | Performed by: INTERNAL MEDICINE

## 2019-03-22 PROCEDURE — 93306 TTE W/DOPPLER COMPLETE: CPT

## 2019-03-27 ENCOUNTER — OFFICE VISIT (OUTPATIENT)
Dept: SLEEP CENTER | Facility: CLINIC | Age: 44
End: 2019-03-27
Payer: COMMERCIAL

## 2019-03-27 VITALS
WEIGHT: 290 LBS | HEART RATE: 74 BPM | DIASTOLIC BLOOD PRESSURE: 78 MMHG | BODY MASS INDEX: 53.37 KG/M2 | HEIGHT: 62 IN | SYSTOLIC BLOOD PRESSURE: 142 MMHG

## 2019-03-27 DIAGNOSIS — G47.10 HYPERSOMNIA: Primary | ICD-10-CM

## 2019-03-27 DIAGNOSIS — R06.83 SNORING: ICD-10-CM

## 2019-03-27 PROCEDURE — 99244 OFF/OP CNSLTJ NEW/EST MOD 40: CPT | Performed by: INTERNAL MEDICINE

## 2019-03-27 NOTE — PROGRESS NOTES
Consultation - [facility] : 1975  MRN: [id number]      Assessment:  The patient has symptoms consistent with obstructive sleep apnea she complains of loud snoring, witnessed apneas, daytime sleepiness and hypertension  Plan:  Diagnostic polysomnography    Follow up: After testing    History of Present Illness:   37 y  o female with a history of loud snoring, progressively worse with weight gain over the years  She also has witnessed apneas  She complains of excessive daytime sleepiness when she sits down to watch TV  She also has a history of hypertension  She complains of gastroesophageal reflux, which does not awaken her from sleep, but generally occurs in the evening  She denies morning headache          Review of Systems      Genitourinary need to urinate more than twice a night and excessive blood loss during menses   Cardiology ankle/leg swelling   Gastrointestinal frequent heartburn/acid reflux   Neurology none   Constitutional weight change   Integumentary none   Psychiatry none   Musculoskeletal back pain   Pulmonary shortness of breath with activity, frequent cough, snoring and difficulty breathing when lying flat    ENT throat clearing   Endocrine excessive thirst and frequent urination   Hematological abnormal blood loss       I have reviewed and updated the review of systems as necessary    Historical Information    Past Medical History:  Diabetes, hypertension, obesity, GERD, rhinitis    Family History: non-contributory      Sleep Schedule: unremarkable    Snoring:  Yes    Witnessed Apnea:  Yes    Medications/Allergies:    Current Outpatient Medications:     cetirizine (ZyrTEC) 10 mg tablet, Take 1 tablet (10 mg total) by mouth daily at bedtime as needed for allergies for up to 90 days, Disp: 90 tablet, Rfl: 2    fluticasone (FLONASE) 50 mcg/act nasal spray, 1 spray into each nostril daily, Disp: 1 Bottle, Rfl: 4    lisinopril-hydrochlorothiazide (PRINZIDE,ZESTORETIC) 10-12 5 MG per tablet, Take 1 tablet by mouth daily for 90 days, Disp: 90 tablet, Rfl: 2    metFORMIN (GLUCOPHAGE) 500 mg tablet, Take 1 tablet (500 mg total) by mouth 2 (two) times a day with meals, Disp: 180 tablet, Rfl: 1    ranitidine (ZANTAC) 150 mg tablet, Take 1 tablet (150 mg total) by mouth 2 (two) times a day, Disp: 180 tablet, Rfl: 1        No notes on file                  Objective:    Vital Signs:   Vitals:    03/27/19 1300   BP: 142/78   Pulse: 74   Weight: 132 kg (290 lb)   Height: 5' 2" (1 575 m)     Neck Circumference: 43      [place] Sleepiness Scale: Total score: 8    Physical Exam:    General: Alert, appropriate, cooperative, overweight    Head: NC/AT    Nose: No septal deviation, nares moderately obstructed, mucosa: areas of bleeding and erythema    Throat: Airway lumen narrowed, tongue base thickened, no tonsils visualized    Neck: Normal, no thyromegaly or lymphadenopathy, no JVD    Heart: RR, normal S1 and S2, 2/6 systolic murmur    Chest: Clear bilaterally    Extremity: No clubbing, cyanosis, no edema    Skin: Warm, dry    Neuro: No motor abnormalities, cranial nerves appear intact        Counseling / Coordination of Care  A description of the counseling / coordination of care: We discussed the pathophysiology of obstructive sleep apnea as well as the possible treatment options  We also discussed the rationale for positive airway pressure therapy              Board Certified Sleep Specialist

## 2019-04-09 ENCOUNTER — CONSULT (OUTPATIENT)
Dept: DERMATOLOGY | Facility: CLINIC | Age: 44
End: 2019-04-09
Payer: COMMERCIAL

## 2019-04-09 VITALS
WEIGHT: 293 LBS | BODY MASS INDEX: 50.02 KG/M2 | HEIGHT: 64 IN | HEART RATE: 87 BPM | TEMPERATURE: 98 F | RESPIRATION RATE: 14 BRPM

## 2019-04-09 DIAGNOSIS — D48.5 NEOPLASM OF UNCERTAIN BEHAVIOR OF SKIN: Primary | ICD-10-CM

## 2019-04-09 DIAGNOSIS — D18.01 CHERRY ANGIOMA: ICD-10-CM

## 2019-04-09 DIAGNOSIS — L82.1 DERMATOSIS PAPULOSA NIGRA: ICD-10-CM

## 2019-04-09 PROCEDURE — 99204 OFFICE O/P NEW MOD 45 MIN: CPT | Performed by: DERMATOLOGY

## 2019-04-09 PROCEDURE — 11102 TANGNTL BX SKIN SINGLE LES: CPT | Performed by: DERMATOLOGY

## 2019-04-09 PROCEDURE — 88305 TISSUE EXAM BY PATHOLOGIST: CPT | Performed by: PATHOLOGY

## 2019-04-22 ENCOUNTER — OFFICE VISIT (OUTPATIENT)
Dept: FAMILY MEDICINE CLINIC | Facility: CLINIC | Age: 44
End: 2019-04-22

## 2019-04-22 VITALS
TEMPERATURE: 97.5 F | HEART RATE: 84 BPM | SYSTOLIC BLOOD PRESSURE: 128 MMHG | DIASTOLIC BLOOD PRESSURE: 84 MMHG | RESPIRATION RATE: 16 BRPM | BODY MASS INDEX: 49.31 KG/M2 | HEIGHT: 64 IN | WEIGHT: 288.8 LBS

## 2019-04-22 DIAGNOSIS — R30.0 DYSURIA: ICD-10-CM

## 2019-04-22 DIAGNOSIS — I10 ESSENTIAL HYPERTENSION: ICD-10-CM

## 2019-04-22 DIAGNOSIS — E11.9 TYPE 2 DIABETES MELLITUS WITHOUT COMPLICATION, WITHOUT LONG-TERM CURRENT USE OF INSULIN (HCC): Primary | ICD-10-CM

## 2019-04-22 LAB
BACTERIA UR QL AUTO: ABNORMAL /HPF
BILIRUB UR QL STRIP: NEGATIVE
CLARITY UR: CLEAR
COLOR UR: YELLOW
CREAT UR-MCNC: 111 MG/DL
GLUCOSE UR STRIP-MCNC: NEGATIVE MG/DL
HGB UR QL STRIP.AUTO: NEGATIVE
HYALINE CASTS #/AREA URNS LPF: ABNORMAL /LPF
KETONES UR STRIP-MCNC: NEGATIVE MG/DL
LEUKOCYTE ESTERASE UR QL STRIP: ABNORMAL
MICROALBUMIN UR-MCNC: 8.2 MG/L (ref 0–20)
MICROALBUMIN/CREAT 24H UR: 7 MG/G CREATININE (ref 0–30)
NITRITE UR QL STRIP: NEGATIVE
NON-SQ EPI CELLS URNS QL MICRO: ABNORMAL /HPF
PH UR STRIP.AUTO: 7 [PH]
PROT UR STRIP-MCNC: NEGATIVE MG/DL
RBC #/AREA URNS AUTO: ABNORMAL /HPF
SL AMB  POCT GLUCOSE, UA: NEGATIVE
SL AMB LEUKOCYTE ESTERASE,UA: NEGATIVE
SL AMB POCT BILIRUBIN,UA: NEGATIVE
SL AMB POCT BLOOD,UA: NEGATIVE
SL AMB POCT CLARITY,UA: CLEAR
SL AMB POCT COLOR,UA: YELLOW
SL AMB POCT KETONES,UA: NEGATIVE
SL AMB POCT NITRITE,UA: NEGATIVE
SL AMB POCT PH,UA: 7
SL AMB POCT SPECIFIC GRAVITY,UA: 1.01
SL AMB POCT URINE PROTEIN: NEGATIVE
SL AMB POCT UROBILINOGEN: NEGATIVE
SP GR UR STRIP.AUTO: 1.02 (ref 1–1.03)
UROBILINOGEN UR QL STRIP.AUTO: 1 E.U./DL
WBC #/AREA URNS AUTO: ABNORMAL /HPF

## 2019-04-22 PROCEDURE — 82043 UR ALBUMIN QUANTITATIVE: CPT | Performed by: FAMILY MEDICINE

## 2019-04-22 PROCEDURE — 82570 ASSAY OF URINE CREATININE: CPT | Performed by: FAMILY MEDICINE

## 2019-04-22 PROCEDURE — 81001 URINALYSIS AUTO W/SCOPE: CPT | Performed by: FAMILY MEDICINE

## 2019-04-22 PROCEDURE — 3061F NEG MICROALBUMINURIA REV: CPT | Performed by: FAMILY MEDICINE

## 2019-04-22 PROCEDURE — 99213 OFFICE O/P EST LOW 20 MIN: CPT | Performed by: FAMILY MEDICINE

## 2019-04-22 PROCEDURE — 87086 URINE CULTURE/COLONY COUNT: CPT | Performed by: FAMILY MEDICINE

## 2019-04-22 PROCEDURE — 81002 URINALYSIS NONAUTO W/O SCOPE: CPT | Performed by: FAMILY MEDICINE

## 2019-04-23 LAB — BACTERIA UR CULT: NORMAL

## 2019-04-25 PROBLEM — R35.0 FREQUENCY OF URINATION AND POLYURIA: Status: RESOLVED | Noted: 2019-02-15 | Resolved: 2019-04-25

## 2019-04-25 PROBLEM — R35.89 FREQUENCY OF URINATION AND POLYURIA: Status: RESOLVED | Noted: 2019-02-15 | Resolved: 2019-04-25

## 2019-06-02 ENCOUNTER — HOSPITAL ENCOUNTER (OUTPATIENT)
Dept: SLEEP CENTER | Facility: CLINIC | Age: 44
Discharge: HOME | End: 2019-06-02
Payer: COMMERCIAL

## 2019-06-02 DIAGNOSIS — R06.83 SNORING: ICD-10-CM

## 2019-06-02 DIAGNOSIS — G47.10 HYPERSOMNIA: ICD-10-CM

## 2019-06-02 PROCEDURE — 95810 POLYSOM 6/> YRS 4/> PARAM: CPT

## 2019-06-05 DIAGNOSIS — G47.33 OSA (OBSTRUCTIVE SLEEP APNEA): Primary | ICD-10-CM

## 2019-06-06 ENCOUNTER — TELEPHONE (OUTPATIENT)
Dept: SLEEP CENTER | Facility: CLINIC | Age: 44
End: 2019-06-06

## 2019-06-10 ENCOUNTER — OFFICE VISIT (OUTPATIENT)
Dept: FAMILY MEDICINE CLINIC | Facility: CLINIC | Age: 44
End: 2019-06-10

## 2019-06-10 VITALS
RESPIRATION RATE: 16 BRPM | DIASTOLIC BLOOD PRESSURE: 90 MMHG | TEMPERATURE: 99.5 F | WEIGHT: 292.2 LBS | HEIGHT: 64 IN | HEART RATE: 82 BPM | BODY MASS INDEX: 49.89 KG/M2 | SYSTOLIC BLOOD PRESSURE: 116 MMHG

## 2019-06-10 DIAGNOSIS — E11.9 TYPE 2 DIABETES MELLITUS WITHOUT COMPLICATION, WITHOUT LONG-TERM CURRENT USE OF INSULIN (HCC): ICD-10-CM

## 2019-06-10 DIAGNOSIS — I10 ESSENTIAL HYPERTENSION: ICD-10-CM

## 2019-06-10 DIAGNOSIS — J06.9 VIRAL URI WITH COUGH: Primary | ICD-10-CM

## 2019-06-10 PROCEDURE — 99213 OFFICE O/P EST LOW 20 MIN: CPT | Performed by: FAMILY MEDICINE

## 2019-06-10 RX ORDER — GUAIFENESIN/DEXTROMETHORPHAN 100-10MG/5
5 SYRUP ORAL 3 TIMES DAILY PRN
Qty: 118 ML | Refills: 0 | Status: SHIPPED | OUTPATIENT
Start: 2019-06-10 | End: 2019-09-12

## 2019-07-31 ENCOUNTER — OFFICE VISIT (OUTPATIENT)
Dept: SLEEP CENTER | Facility: CLINIC | Age: 44
End: 2019-07-31
Payer: COMMERCIAL

## 2019-07-31 VITALS
WEIGHT: 292.5 LBS | HEIGHT: 63 IN | DIASTOLIC BLOOD PRESSURE: 70 MMHG | BODY MASS INDEX: 51.83 KG/M2 | SYSTOLIC BLOOD PRESSURE: 110 MMHG

## 2019-07-31 DIAGNOSIS — G47.33 OSA (OBSTRUCTIVE SLEEP APNEA): Primary | ICD-10-CM

## 2019-07-31 PROCEDURE — 99214 OFFICE O/P EST MOD 30 MIN: CPT | Performed by: INTERNAL MEDICINE

## 2019-07-31 NOTE — PROGRESS NOTES
Progress Note - Sleep Center   Nate Melchor : 1975  MRN: [id number]      Assessment:  The patient has mild obstructive sleep apnea with some element of daytime sleepiness  After reviewing her study with her and explaining the potential benefits of treatment, she is agreeable to starting APAP  Plan:  APAP 4 to 20 cm    Follow up:  Compliance check    History of Present Illness:   53 y  o female who was noticed by her primary physician to have a small airway  When asked about snoring she said that she did, but it was unclear if she stops breathing  She denies awakening with a gasping sensation  She also has a history of hypertension  She underwent polysomnography which demonstrated mild ANGE with AHI = 6 5  She denies morning headaches but does have dry mouth  She has frequent nocturnal gastroesophageal reflux        Review of Systems      Genitourinary need to urinate more than twice a night and excessive blood loss during menses   Cardiology palpitations/fluttering feeling in the chest and ankle/leg swelling   Gastrointestinal frequent heartburn/acid reflux   Neurology none   Constitutional fatigue   Integumentary none   Psychiatry none   Musculoskeletal sciatica   Pulmonary none   ENT throat clearing   Endocrine excessive thirst   Hematological none         I have reviewed and updated the review of systems as necessary    Historical Information    Past Medical History:  Diabetes mellitus, GERD, hypertension, allergic rhinitis    Family History: non-contributory    Social History     Socioeconomic History    Marital status: Single     Spouse name: None    Number of children: 3    Years of education: None    Highest education level: None   Occupational History    Occupation: Behavioral health field   Social Needs    Financial resource strain: None    Food insecurity:     Worry: None     Inability: None    Transportation needs:     Medical: None     Non-medical: None   Tobacco Use    Smoking status: Never Smoker    Smokeless tobacco: Never Used   Substance and Sexual Activity    Alcohol use: Never     Frequency: Never    Drug use: Never    Sexual activity: Not Currently     Birth control/protection: Female Sterilization   Lifestyle    Physical activity:     Days per week: None     Minutes per session: None    Stress: None   Relationships    Social connections:     Talks on phone: None     Gets together: None     Attends Jain service: None     Active member of club or organization: None     Attends meetings of clubs or organizations: None     Relationship status: None    Intimate partner violence:     Fear of current or ex partner: None     Emotionally abused: None     Physically abused: None     Forced sexual activity: None   Other Topics Concern    None   Social History Narrative    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         Sleep Schedule: unremarkable    Snoring:  Yes    Witnessed Apnea:  No    Medications/Allergies:    Current Outpatient Medications:     fluticasone (FLONASE) 50 mcg/act nasal spray, 1 spray into each nostril daily, Disp: 1 Bottle, Rfl: 4    metFORMIN (GLUCOPHAGE) 500 mg tablet, Take 1 tablet (500 mg total) by mouth 2 (two) times a day with meals, Disp: 180 tablet, Rfl: 1    ranitidine (ZANTAC) 150 mg tablet, Take 1 tablet (150 mg total) by mouth 2 (two) times a day, Disp: 180 tablet, Rfl: 1    cetirizine (ZyrTEC) 10 mg tablet, Take 1 tablet (10 mg total) by mouth daily at bedtime as needed for allergies for up to 90 days, Disp: 90 tablet, Rfl: 2    dextromethorphan-guaiFENesin (ROBITUSSIN DM)  mg/5 mL syrup, Take 5 mL by mouth 3 (three) times a day as needed for cough (Patient not taking: Reported on 7/31/2019), Disp: 118 mL, Rfl: 0    lisinopril-hydrochlorothiazide (PRINZIDE,ZESTORETIC) 10-12 5 MG per tablet, Take 1 tablet by mouth daily for 90 days, Disp: 90 tablet, Rfl: 2        No notes on file Objective:    Vital Signs:   Vitals:    07/31/19 1500   BP: 110/70   Weight: 133 kg (292 lb 8 oz)   Height: 5' 3" (1 6 m)     Neck Circumference: 16 5      [place] Sleepiness Scale: Total score: 6    Physical Exam:    General: Alert, appropriate, cooperative, overweight    Head: NC/AT, mild retrognathia    Nose: No septal deviation, nares partially obstructed, mucosa normal    Throat: Airway diminished, tongue base thickened, no tonsils visualized    Neck: Normal, no thyromegaly or lymphadenopathy, no JVD    Heart: RR, normal S1 and S2, no murmurs    Chest: Clear bilaterally    Extremity: No clubbing, cyanosis, trace edema    Skin: Warm, dry    Neuro: No motor abnormalities, cranial nerves appear intact    Sleep Study Results:   AHI = 6 5  PAP Pressure: Auto PAP: 4 to 20 cm  DME Provider: [employer] [facility]    Counseling / Coordination of Care  A description of the counseling / coordination of care: We discussed treatment options  We reviewed her sleep study  All questions answered      Board Certified Sleep Specialist    Portions of the record may have been created with voice recognition software  Occasional wrong word or "sound a like" substitutions may have occurred due to the inherent limitations of voice recognition software  Read the chart carefully and recognize, using context, where substitutions have occurred

## 2019-08-08 DIAGNOSIS — J30.9 ALLERGIC RHINITIS, UNSPECIFIED SEASONALITY, UNSPECIFIED TRIGGER: ICD-10-CM

## 2019-08-08 RX ORDER — FLUTICASONE PROPIONATE 50 MCG
1 SPRAY, SUSPENSION (ML) NASAL DAILY
Qty: 16 ML | Refills: 4 | Status: SHIPPED | OUTPATIENT
Start: 2019-08-08 | End: 2019-09-12 | Stop reason: SDUPTHER

## 2019-09-05 DIAGNOSIS — E11.9 TYPE 2 DIABETES MELLITUS WITHOUT COMPLICATION, WITHOUT LONG-TERM CURRENT USE OF INSULIN (HCC): ICD-10-CM

## 2019-09-05 DIAGNOSIS — K21.9 CHRONIC GERD: ICD-10-CM

## 2019-09-10 RX ORDER — RANITIDINE 150 MG/1
TABLET ORAL
Qty: 60 TABLET | Refills: 5 | Status: SHIPPED | OUTPATIENT
Start: 2019-09-10 | End: 2019-09-12 | Stop reason: SDUPTHER

## 2019-09-12 ENCOUNTER — OFFICE VISIT (OUTPATIENT)
Dept: FAMILY MEDICINE CLINIC | Facility: CLINIC | Age: 44
End: 2019-09-12

## 2019-09-12 VITALS
HEIGHT: 62 IN | SYSTOLIC BLOOD PRESSURE: 112 MMHG | DIASTOLIC BLOOD PRESSURE: 68 MMHG | RESPIRATION RATE: 16 BRPM | HEART RATE: 74 BPM | BODY MASS INDEX: 53.92 KG/M2 | WEIGHT: 293 LBS | TEMPERATURE: 99.4 F

## 2019-09-12 DIAGNOSIS — J30.9 ALLERGIC RHINITIS, UNSPECIFIED SEASONALITY, UNSPECIFIED TRIGGER: ICD-10-CM

## 2019-09-12 DIAGNOSIS — K21.9 CHRONIC GERD: ICD-10-CM

## 2019-09-12 DIAGNOSIS — E11.9 TYPE 2 DIABETES MELLITUS WITHOUT COMPLICATION, WITHOUT LONG-TERM CURRENT USE OF INSULIN (HCC): Primary | ICD-10-CM

## 2019-09-12 DIAGNOSIS — I10 ESSENTIAL HYPERTENSION: ICD-10-CM

## 2019-09-12 LAB — SL AMB POCT HEMOGLOBIN AIC: 6.6 (ref ?–6.5)

## 2019-09-12 PROCEDURE — 83036 HEMOGLOBIN GLYCOSYLATED A1C: CPT | Performed by: FAMILY MEDICINE

## 2019-09-12 PROCEDURE — 3078F DIAST BP <80 MM HG: CPT | Performed by: FAMILY MEDICINE

## 2019-09-12 PROCEDURE — 99213 OFFICE O/P EST LOW 20 MIN: CPT | Performed by: FAMILY MEDICINE

## 2019-09-12 PROCEDURE — 3074F SYST BP LT 130 MM HG: CPT | Performed by: FAMILY MEDICINE

## 2019-09-12 PROCEDURE — 3044F HG A1C LEVEL LT 7.0%: CPT | Performed by: FAMILY MEDICINE

## 2019-09-12 RX ORDER — RANITIDINE 150 MG/1
150 TABLET ORAL 2 TIMES DAILY
Qty: 180 TABLET | Refills: 1 | Status: SHIPPED | OUTPATIENT
Start: 2019-09-12 | End: 2020-04-07 | Stop reason: ALTCHOICE

## 2019-09-12 RX ORDER — FLUTICASONE PROPIONATE 50 MCG
1 SPRAY, SUSPENSION (ML) NASAL DAILY
Qty: 16 ML | Refills: 4 | Status: SHIPPED | OUTPATIENT
Start: 2019-09-12 | End: 2021-12-22 | Stop reason: SINTOL

## 2019-09-12 RX ORDER — CETIRIZINE HYDROCHLORIDE 10 MG/1
10 TABLET ORAL
Qty: 90 TABLET | Refills: 1 | Status: SHIPPED | OUTPATIENT
Start: 2019-09-12 | End: 2020-03-31

## 2019-09-12 RX ORDER — LISINOPRIL AND HYDROCHLOROTHIAZIDE 12.5; 1 MG/1; MG/1
1 TABLET ORAL DAILY
Qty: 90 TABLET | Refills: 1 | Status: SHIPPED | OUTPATIENT
Start: 2019-09-12 | End: 2020-06-11

## 2019-09-12 NOTE — PATIENT INSTRUCTIONS
Crystal Kaur, your HbA1C is 6 6% today, which is fantastic! Keep up the good work  From now on, we can test you every 6 months, and since your blood pressure is doing well, we can move our next appointment to 6 months if you like  Please let me know if you need anything in the meantime  Sleep Apnea   AMBULATORY CARE:   Sleep apnea  is also called obstructive sleep apnea  It is a condition that causes you to stop breathing for 10 seconds or more while you are sleeping  During normal sleep, your throat is kept open by muscles that let air pass through easily  Sleep apnea causes the muscles and tissues around your throat to relax and block air from passing through  Sleep apnea may happen many times while you are asleep  Common symptoms include the following:   · No signs of breathing for 10 seconds or more while you sleep    · Snoring loudly, snorting, gasping or choking while you sleep, and waking up suddenly because of these    · A hard time thinking, remembering things, or focusing on your tasks the following day    · Headache or nausea    · Bedwetting or waking up often during the night to urinate    · Feeling sleepy, slow, and tired during the day  Seek care immediately if:   · You have chest pain or trouble breathing  Contact your healthcare provider if:   · You feel tired or depressed  · You have trouble staying awake during the day  · You have trouble thinking clearly  · You have questions or concerns about your condition or care  Treatment for sleep apnea  includes using a continuous positive airway pressure (CPAP) machine to keep your airway open during sleep  A mask is placed over your nose and mouth, or just your nose  The mask is hooked to the CPAP machine that blows a gentle stream of air into the mask when you breathe  This helps keep your airway open so you can breathe more regularly  Extra oxygen may be given to you through the machine  You may be given a mouth device   It looks like a mouth guard or dental retainer and stops your tongue and mouth tissues from blocking your throat while you sleep  Surgery may be needed to remove extra tissues that block your mouth, throat, or nose  Manage sleep apnea:   · Do not smoke  Nicotine and other chemicals in cigarettes and cigars can cause lung damage  Ask your healthcare provider for information if you currently smoke and need help to quit  E-cigarettes or smokeless tobacco still contain nicotine  Talk to your healthcare provider before you use these products  · Do not drink alcohol or take sedative medicine before you go to sleep  Alcohol and sedatives can relax the muscles and tissues around your throat  This can block the airflow to your lungs  · Maintain a healthy weight  Excess tissue around your throat may restrict your breathing  Ask your healthcare provider for information if you need to lose weight  · Sleep on your side or use pillows designed to prevent sleep apnea  This prevents your tongue or other tissues from blocking your throat  You can also raise the head of your bed  Follow up with your healthcare provider as directed:  Write down your questions so you remember to ask them during your visits  © 2017 [address] [employer] Information is for End User's use only and may not be sold, redistributed or otherwise used for commercial purposes  All illustrations and images included in CareNotes® are the copyrighted property of A [employer] M [employer] Inc  or [employer]  The above information is an  only  It is not intended as medical advice for individual conditions or treatments  Talk to your doctor, nurse or pharmacist before following any medical regimen to see if it is safe and effective for you  Nutrition: How to Make [address]  A healthy diet has a lot of benefits  It can prevent certain health conditions like heart disease and cancer, and it can lower your cholesterol   It can give you more energy, help you focus, and improve your mood  It can also help you lose weight or stay at a healthy weight  Path to Improved Health  The choices you make about what you eat and drink matter  They should add up to a balanced, nutritious diet  We all have different calorie needs based on our age, sex, and activity level  Health conditions can have a role, too  Fruits and Vegetables  Fruits and vegetables are rich in fiber, vitamins, and minerals  They should be the basis of your diet  Try to get many different colors of fruits and vegetables each day to add flavor and variety  Fruits and vegetables should cover half of your plate at each meal  Try not to add saturated fats and sugar to vegetables and fruits  This means avoiding margarine, butter, mayonnaise, and sour cream  You can use yogurt, healthy oils (such as canola or olive oil), or herbs instead  Potatoes and corn are not considered vegetables  Your body processes them more like grains  FRUITS & VEGETABLES   INSTEAD OF THIS: TRY THIS:   Regular or fried vegetables served with cream, cheese, or butter Raw, steamed, boiled, sautéed, or baked vegetables tossed with olive oil, salt, and pepper, or with onions or spices added (like garlic and cumin)   Fruits served with cream cheese or sugary sauces Fresh fruit with peanut, almond, or cashew butter or plain yogurt   Fried potatoes, including french fries, hash browns, and potato chips Baked sweet potatoes or other vegetables     Grains  Choose products that list whole grains as the first ingredient  Whole grains are high in fiber, protein, and vitamins  They are digested slowly, which helps you feel full longer and keeps you from overeating  Avoid products that say enriched    Hot cereals like oatmeal are usually low in saturated fat  However, instant cereals with cream may contain processed oils and can be high in sugar  Granola cereals usually contain a lot of sugar   Cold cereals are generally made with refined grains and are high in sugars  Look for whole-grain, low-sugar options instead  Try not to eat rich sweets, such as doughnuts, rolls, and muffins  Consider fruit or a piece of dark chocolate instead to satisfy your sweet tooth  GRAINS   INSTEAD OF THIS: TRY THIS:   Croissants, rolls, biscuits, and white breads Whole-grain breads, including wheat, rye, and pumpernickel   Doughnuts, pastries, and scones Whole-grain English muffins and small whole-grain bagels   Fried tortillas Soft tortillas (corn or whole wheat) without trans fats   Sugary cereals and regular granola Whole-grain cereal, oatmeal, and reduced-sugar granola   Snack crackers Whole-grain crackers   Potato or corn chips and buttered popcorn Unbuttered popcorn   White pasta Whole-wheat pasta   White rice Brown or wild rice   Fried rice or pasta mixes Brown rice or whole-grain pasta with low-sodium vegetable sauce   All-purpose white flour Whole-wheat flour     Protein  Protein can come from animal and vegetable sources  People who get more of their protein from animal sources tend to have more health problems that can lead to illness and early death  It is healthier to eat meat less often and get most of your protein from plant sources  When you eat meat, choose leaner cuts  Vegetable Protein Sources  There are many ways to get protein in your diet even if you do not eat meat  Most vegetables have some protein  When you eat these vegetables with whole grains, seeds, nuts, and especially beans, you can get a good amount of protein  You can swap beans for meat in recipes like lasagna or chili  Soy foods such as tofu, tempeh, and edamame are also good sources of protein  Beef, Pork, Veal, and [employer] and veal cuts have the words loin or round in their names  Lean pork cuts have the words loin or leg in their names  Trim off the outside fat before cooking the meat  Trim any inside fat before eating it   Use herbs, spices, and low-sodium marinades to season meat  Baking, broiling, grilling, and roasting are the healthiest ways to cook meats  Lean cuts can be panbroiled or stir-fried  Use a nonstick pan, canola oil, or olive oil instead of butter or margarine  Don't serve meat with high-fat sauces and gravies  Poultry  Chicken breasts are a good choice because they are low in fat and high in protein  Only eat duck and goose once in a while, because they are higher in saturated fat  Remove skin and visible fat before cooking  Baking, broiling, grilling, and roasting are the healthiest ways to [employer]  Skinless poultry can be pan broiled or stir fried  Use a nonstick pan, canola oil, or olive oil instead of butter or margarine  Seafood  Most seafood is high in healthy polyunsaturated fats  Healthy omega-3 fatty acids also are found in some fish, such as salmon and cold-water trout  If good-quality fresh fish isn't available, buy frozen fish  To prepare fish, you should poach, steam, bake, broil, or grill it  PROTEIN   INSTEAD OF THIS: TRY THIS:   Prime and marbled cuts of meat Select-grade lean beef, such as round, sirloin, and loin cuts   Pork spare ribs and huston Lean pork, such as tenderloin and loin chop, turkey huston, tofu huston   Regular ground beef Lean or extra-lean ground beef, ground chicken or turkey, tempeh, or beans   Lunch meats, such as pepperoni, salami, bologna, and liverwurst Lean lunch meats, such as turkey, chicken, and ham   Regular hot dogs and sausage Fat-free hot dogs, turkey dogs, tofu hot dogs   Breaded fish sticks and cakes, fish canned in oil, or seafood prepared with butter or served with high-fat sauce Fish (fresh, frozen, or canned in water), grilled fish sticks and cakes, or shellfish     Dairy  Choose low-fat, skim, or nondairy milk, such as soy, rice, or almond milk   Try low-fat or part-skim cheeses and other dairy products, or choose smaller portions of foods high in saturated fat    Yogurt can replace sour cream in many recipes  It is important to pick yogurt without added sugar  Try mixing yogurt with fruit for dessert  Sorbet and frozen yogurt are lower in fat than ice cream     DAIRY   INSTEAD OF THIS: TRY THIS:   Whole milk Skim (nonfat), 1% or 2% (low fat), or nondairy milk, such as soy, rice, almond, or cashew milk   Cream or evaporated milk Evaporated skim milk   Regular buttermilk Low-fat buttermilk   Yogurt made with whole milk Low-fat or nonfat yogurt   Regular cheese, including American, blue, Brie, cheddar, [employer]ani, and Parmesan Low-fat cheese with less than 3 g fat per serving, or nondairy soy cheese   Regular cottage cheese Low-fat cottage cheese (less than 2% fat)   Regular cream cheese Low-fat cream cheese with less than 3 g fat per 1 oz, or skim ricotta   Ice cream Sorbet, sherbet, or frozen yogurt with less than 3 g fat per ½-cup serving     Fats and Oils  Although high-fat foods are higher in calories, they can help you feel satisfied with eating less  Don't be afraid to have fats in your diet, but try to limit saturated and trans fats  You need saturated and unsaturated fats in your diet, but most Americans get too much saturated fat  Heart disease, diabetes, some cancers, and arthritis have been linked to diets high in saturated fat, particularly saturated fats from animal products  FATS & OILS   INSTEAD OF THIS: TRY THIS:   Cookies Fruit or whole-grain cookies   Shortening, butter, and margarine Olive, canola, and soybean oils   Regular mayonnaise Yogurt   Regular salad dressing Vinaigrette with olive oil and vinegar   Butter or fat to grease pans Nonstick cooking spray, olive oil, or canola oil     Beverages  It is important that you stay hydrated  However, drinks that contain sugar are not healthy  This includes fruit juices, soda, sports and energy drinks, sweetened or flavored milk, and sweet tea  Artificial sweeteners may also be bad for your health   Drink mostly water or other unsweetened drinks  Don't drink too much alcohol  Women should have no more than one drink per day  Men should have no more than two drinks per day  More information  American Academy of [employer]icians Patient Information Resource   https://familydoctor  org/dietary-fats-whats-good-and-whats-bad/ and https://familydoctor  org/nutrition-tips-for-improving-your-health/  [employer] Eating Plate   [facility]   Department of Agriculture, Choose My Plate   [employer] br

## 2019-09-12 NOTE — PROGRESS NOTES
Assessment/Plan:     Problem List Items Addressed This Visit        Digestive    Chronic GERD     -Refilled zantac today  -Discussed "Anti-reflux" measures:       -Raise the head of the bed 4 to 6 inches      -Avoid excess coffee, tea or other caffeinated beverages      -Avoid spicy or acidic foods, avoid peppermint, chocolate      -Minimize eating within 2 hours of bedtime      -Avoid garments that fit tightly through the abdomen         Relevant Medications    ranitidine (ZANTAC) 150 mg tablet       Endocrine    Type 2 diabetes mellitus without complication, without long-term current use of insulin (HCC) - Primary     Lab Results   Component Value Date    HGBA1C 6 6 (A) 09/12/2019   -T2DM remains well-controlled on metformin 500 mg BID  -UTD on urine microalbumin, eye exam; foot exam next visit  -Continue low-carbohydrate diet  -Encouraged weight loss efforts through diet and exercise  -Patient declines nutritionist referral today         Relevant Medications    metFORMIN (GLUCOPHAGE) 500 mg tablet    Other Relevant Orders    POCT hemoglobin A1c (Completed)       Respiratory    Allergic rhinitis    Relevant Medications    cetirizine (ZyrTEC) 10 mg tablet    fluticasone (FLONASE) 50 mcg/act nasal spray       Cardiovascular and Mediastinum    Essential hypertension     -/68, at goal today   -Refilled lisinopril-HCTZ 10-12 5 mg QD  -Advised patient on symptoms of hypotension & severe HTN  -Limit salt-intake & caffeine in diet, minimize stress level  -Weight reduction efforts via improved diet & increased exercise  -DASH diet handout given via AVS   -Discussed importance of treating HTN to prevent ASCVD, CVA         Relevant Medications    lisinopril-hydrochlorothiazide (PRINZIDE,ZESTORETIC) 10-12 5 MG per tablet            Subjective:      Patient ID: Annika Haider is a 53 y o  female  -T2DM: Taking metformin 500 mg BID, no issues, due for HbA1C today, previous 6 5%   Urine microalbumin done 4/22/19, declines foot exam today  Adherent to low-carbohydrate diet, but has not yet been able to lose any weight; declines referral to nutritionist or medical weight management at this time  -HTN: Well-controlled on lisinopril-HCTZ combination, today /68, asymptomatic overall  -ANGE: Patient had PSG which showed she mild obstructive sleep apnea and mild periodic limb movements of sleep, had titration study and is currently on CPAP HS with dreamweaver mask, on ramp up  Says she doesn't see a lot of difference yet but is fully adherent, has been using it for a month  Has a follow up appt next month  The following portions of the patient's history were reviewed and updated as appropriate: allergies, current medications, past family history, past medical history, past social history, past surgical history and problem list     Review of Systems   Constitutional: Negative for chills, fatigue and fever  Eyes: Negative for visual disturbance  Respiratory: Negative for chest tightness and shortness of breath  Cardiovascular: Negative for chest pain, palpitations and leg swelling  Gastrointestinal: Negative for abdominal pain, diarrhea, nausea and vomiting  GERD well-controlled on zantac, needs refill   Genitourinary: Negative for dysuria  Musculoskeletal: Negative for gait problem  Plantar fasciitis relieved by OTC orthotics, not currently symptomatic    Skin: Negative for rash  Neurological: Negative for syncope, weakness and light-headedness  Psychiatric/Behavioral: Negative for agitation, sleep disturbance and suicidal ideas  All other systems reviewed and are negative          Objective:  /68 (BP Location: Left arm, Patient Position: Sitting, Cuff Size: Large)   Pulse 74   Temp 99 4 °F (37 4 °C) (Tympanic)   Resp 16   Ht 5' 2 3" (1 582 m)   Wt 134 kg (294 lb 6 4 oz)   LMP 09/03/2019   BMI 53 33 kg/m²      Physical Exam   Constitutional: She is oriented to person, place, and time  She appears well-developed and well-nourished  HENT:   Head: Normocephalic and atraumatic  Mouth/Throat: Oropharynx is clear and moist    Eyes: Right eye exhibits no discharge  Left eye exhibits no discharge  Neck: Normal range of motion  Neck supple  Cardiovascular: Normal rate, regular rhythm and intact distal pulses  Pulmonary/Chest: Effort normal and breath sounds normal  No respiratory distress  Abdominal: Soft  Bowel sounds are normal  She exhibits no distension  There is no tenderness  Musculoskeletal: Normal range of motion  She exhibits no edema  Neurological: She is alert and oriented to person, place, and time  Skin: Skin is warm and dry  Capillary refill takes less than 2 seconds  Psychiatric: She has a normal mood and affect  Her behavior is normal  Judgment and thought content normal    Vitals reviewed        SIMONA Ibarra  PGY-3  [place] [address]

## 2019-09-14 NOTE — ASSESSMENT & PLAN NOTE
-Refilled zantac today  -Discussed "Anti-reflux" measures:       -Raise the head of the bed 4 to 6 inches      -Avoid excess coffee, tea or other caffeinated beverages      -Avoid spicy or acidic foods, avoid peppermint, chocolate      -Minimize eating within 2 hours of bedtime      -Avoid garments that fit tightly through the abdomen

## 2019-09-14 NOTE — ASSESSMENT & PLAN NOTE
-/68, at goal today   -Refilled lisinopril-HCTZ 10-12 5 mg QD  -Advised patient on symptoms of hypotension & severe HTN  -Limit salt-intake & caffeine in diet, minimize stress level  -Weight reduction efforts via improved diet & increased exercise  -DASH diet handout given via AVS   -Discussed importance of treating HTN to prevent ASCVD, CVA

## 2019-09-14 NOTE — ASSESSMENT & PLAN NOTE
Lab Results   Component Value Date    HGBA1C 6 6 (A) 09/12/2019   -T2DM remains well-controlled on metformin 500 mg BID  -UTD on urine microalbumin, eye exam; foot exam next visit  -Continue low-carbohydrate diet  -Encouraged weight loss efforts through diet and exercise  -Patient declines nutritionist referral today

## 2019-10-08 ENCOUNTER — OFFICE VISIT (OUTPATIENT)
Dept: SLEEP CENTER | Facility: CLINIC | Age: 44
End: 2019-10-08
Payer: COMMERCIAL

## 2019-10-08 ENCOUNTER — TELEPHONE (OUTPATIENT)
Dept: SLEEP CENTER | Facility: CLINIC | Age: 44
End: 2019-10-08

## 2019-10-08 VITALS
SYSTOLIC BLOOD PRESSURE: 124 MMHG | BODY MASS INDEX: 53.92 KG/M2 | HEART RATE: 84 BPM | HEIGHT: 62 IN | DIASTOLIC BLOOD PRESSURE: 78 MMHG | WEIGHT: 293 LBS

## 2019-10-08 DIAGNOSIS — G47.33 OSA (OBSTRUCTIVE SLEEP APNEA): Primary | ICD-10-CM

## 2019-10-08 PROCEDURE — 99214 OFFICE O/P EST MOD 30 MIN: CPT | Performed by: INTERNAL MEDICINE

## 2019-10-08 NOTE — TELEPHONE ENCOUNTER
Received an RX for a pressure change  Changed accordingly  PT's now set to a pressure of 6cm  Informed patient while she was here

## 2019-10-08 NOTE — PROGRESS NOTES
Progress Note - Sleep Center   Jason Summers GIR: MRN: [id number]      Reason for Visit:  53 y  o female here for PAP compliance check    Assessment:  Doing well with new PAP device  Sleep quality is improved and the patient feels less drowsy  Compliance data show utilization for greater than or equal to 70% of nights, for greater than or equal to 4 hours per night  Plan:  Adequate compliance and successful treatment  The patient met with the representative from  [place]  and swap her mask for one that is more comfortable  Review of the patient's compliance data suggest that 6 cm is as high as the patient requires  PAP will be changed to 6 cm  Follow up: One year    History of Present Illness:  History of ANGE on PAP therapy  Meets adequate compliance  Her only complaint is mask leak due to discomfort and the need to reposition          Review of Systems      Genitourinary need to urinate more than twice a night   Cardiology ankle/leg swelling   Gastrointestinal frequent heartburn/acid reflux   Neurology none   Constitutional none   Integumentary none   Psychiatry none   Musculoskeletal sciatica   Pulmonary frequent cough and snoring   ENT throat clearing   Endocrine excessive thirst and frequent urination   Hematological none             I have reviewed and updated the review of systems as necessary    Historical Information    Past Medical History:   Diagnosis Date    Hypertension     Pre-diabetes          Past Surgical History:   Procedure Laterality Date     SECTION      TUBAL LIGATION           Social History     Socioeconomic History    Marital status: Single     Spouse name: None    Number of children: 3    Years of education: None    Highest education level: None   Occupational History    Occupation: Behavioral health field   Social Needs    Financial resource strain: None    Food insecurity:     Worry: None     Inability: None    Transportation needs: Medical: None     Non-medical: None   Tobacco Use    Smoking status: Never Smoker    Smokeless tobacco: Never Used   Substance and Sexual Activity    Alcohol use: Never     Frequency: Never    Drug use: Never    Sexual activity: Not Currently     Birth control/protection: Female Sterilization   Lifestyle    Physical activity:     Days per week: None     Minutes per session: None    Stress: None   Relationships    Social connections:     Talks on phone: None     Gets together: None     Attends Confucianist service: None     Active member of club or organization: None     Attends meetings of clubs or organizations: None     Relationship status: None    Intimate partner violence:     Fear of current or ex partner: None     Emotionally abused: None     Physically abused: None     Forced sexual activity: None   Other Topics Concern    None   Social History Narrative    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         Family History   Problem Relation Age of Onset    Hypertension Mother     Diabetes Mother     Hypertension Father     Diabetes Father     Cancer Maternal Grandmother     Diabetes Maternal Grandfather        Medications/Allergies:      Current Outpatient Medications:     cetirizine (ZyrTEC) 10 mg tablet, Take 1 tablet (10 mg total) by mouth daily at bedtime as needed for allergies, Disp: 90 tablet, Rfl: 1    fluticasone (FLONASE) 50 mcg/act nasal spray, 1 spray into each nostril daily, Disp: 16 mL, Rfl: 4    lisinopril-hydrochlorothiazide (PRINZIDE,ZESTORETIC) 10-12 5 MG per tablet, Take 1 tablet by mouth daily, Disp: 90 tablet, Rfl: 1    metFORMIN (GLUCOPHAGE) 500 mg tablet, Take 1 tablet (500 mg total) by mouth 2 (two) times a day with meals, Disp: 180 tablet, Rfl: 1    ranitidine (ZANTAC) 150 mg tablet, Take 1 tablet (150 mg total) by mouth 2 (two) times a day, Disp: 180 tablet, Rfl: 1      Objective    Vital Signs:   Vitals:    10/08/19 1400   BP: 124/78   Pulse: 84 [place] Sleepiness Scale: Total score: 5        Physical Exam:    General: Alert, appropriate, cooperative, overweight    Head: NC/AT    Skin: Warm, dry    Neuro: No motor abnormalities, cranial nerves appear intact    Extremity: No clubbing, cyanosis    PAP setting:   APAP 4 to 20 cm, which I will change to CPAP of 6 cm  DME Provider: [facility]  Test results:  Mild ANGE (AHI = 6 5)    Counseling / Coordination of Care  Total clinic time spent today 20 minutes  A description of the counseling / coordination of care: Maintain compliance  Discussed equipment  Claudean Bean, M D    Board Certified Sleep Specialist

## 2020-01-15 ENCOUNTER — CLINICAL SUPPORT (OUTPATIENT)
Dept: FAMILY MEDICINE CLINIC | Facility: CLINIC | Age: 45
End: 2020-01-15

## 2020-01-15 DIAGNOSIS — Z13.5 SCREENING FOR DIABETIC RETINOPATHY: Primary | ICD-10-CM

## 2020-01-15 LAB
LEFT EYE DIABETIC RETINOPATHY: NORMAL
LEFT EYE IMAGE QUALITY: NORMAL
LEFT EYE MACULAR EDEMA: NORMAL
LEFT EYE OTHER RETINOPATHY: NORMAL
RIGHT EYE DIABETIC RETINOPATHY: NORMAL
RIGHT EYE IMAGE QUALITY: NORMAL
RIGHT EYE MACULAR EDEMA: NORMAL
RIGHT EYE OTHER RETINOPATHY: NORMAL
SEVERITY (EYE EXAM): NORMAL

## 2020-01-15 PROCEDURE — 92250 FUNDUS PHOTOGRAPHY W/I&R: CPT | Performed by: FAMILY MEDICINE

## 2020-03-30 DIAGNOSIS — J30.9 ALLERGIC RHINITIS, UNSPECIFIED SEASONALITY, UNSPECIFIED TRIGGER: ICD-10-CM

## 2020-03-31 RX ORDER — CETIRIZINE HYDROCHLORIDE 10 MG/1
10 TABLET ORAL
Qty: 90 TABLET | Refills: 1 | Status: SHIPPED | OUTPATIENT
Start: 2020-03-31 | End: 2020-09-24 | Stop reason: SDUPTHER

## 2020-04-03 ENCOUNTER — TELEPHONE (OUTPATIENT)
Dept: FAMILY MEDICINE CLINIC | Facility: CLINIC | Age: 45
End: 2020-04-03

## 2020-04-03 DIAGNOSIS — K21.9 CHRONIC GERD: Primary | ICD-10-CM

## 2020-04-07 RX ORDER — FAMOTIDINE 20 MG/1
20 TABLET, FILM COATED ORAL 2 TIMES DAILY
Qty: 60 TABLET | Refills: 1 | Status: SHIPPED | OUTPATIENT
Start: 2020-04-07 | End: 2020-06-15

## 2020-06-11 DIAGNOSIS — I10 ESSENTIAL HYPERTENSION: ICD-10-CM

## 2020-06-11 RX ORDER — LISINOPRIL AND HYDROCHLOROTHIAZIDE 12.5; 1 MG/1; MG/1
TABLET ORAL
Qty: 90 TABLET | Refills: 1 | Status: SHIPPED | OUTPATIENT
Start: 2020-06-11 | End: 2020-12-21 | Stop reason: SDUPTHER

## 2020-06-15 DIAGNOSIS — K21.9 CHRONIC GERD: ICD-10-CM

## 2020-06-15 RX ORDER — FAMOTIDINE 20 MG/1
TABLET, FILM COATED ORAL
Qty: 60 TABLET | Refills: 1 | Status: SHIPPED | OUTPATIENT
Start: 2020-06-15 | End: 2020-10-01 | Stop reason: SDUPTHER

## 2020-06-25 DIAGNOSIS — E11.9 TYPE 2 DIABETES MELLITUS WITHOUT COMPLICATION, WITHOUT LONG-TERM CURRENT USE OF INSULIN (HCC): ICD-10-CM

## 2020-09-24 DIAGNOSIS — J30.9 ALLERGIC RHINITIS, UNSPECIFIED SEASONALITY, UNSPECIFIED TRIGGER: ICD-10-CM

## 2020-09-24 RX ORDER — CETIRIZINE HYDROCHLORIDE 10 MG/1
10 TABLET ORAL
Qty: 90 TABLET | Refills: 1 | Status: SHIPPED | OUTPATIENT
Start: 2020-09-24 | End: 2021-03-28

## 2020-09-28 ENCOUNTER — TELEPHONE (OUTPATIENT)
Dept: FAMILY MEDICINE CLINIC | Facility: CLINIC | Age: 45
End: 2020-09-28

## 2020-10-01 ENCOUNTER — TELEPHONE (OUTPATIENT)
Dept: FAMILY MEDICINE CLINIC | Facility: CLINIC | Age: 45
End: 2020-10-01

## 2020-10-01 DIAGNOSIS — K21.9 CHRONIC GERD: ICD-10-CM

## 2020-10-01 RX ORDER — FAMOTIDINE 20 MG/1
20 TABLET, FILM COATED ORAL 2 TIMES DAILY
Qty: 60 TABLET | Refills: 0 | Status: SHIPPED | OUTPATIENT
Start: 2020-10-01 | End: 2020-10-21 | Stop reason: SDUPTHER

## 2020-10-21 ENCOUNTER — OFFICE VISIT (OUTPATIENT)
Dept: FAMILY MEDICINE CLINIC | Facility: CLINIC | Age: 45
End: 2020-10-21

## 2020-10-21 VITALS
DIASTOLIC BLOOD PRESSURE: 80 MMHG | HEART RATE: 99 BPM | HEIGHT: 62 IN | SYSTOLIC BLOOD PRESSURE: 118 MMHG | BODY MASS INDEX: 53.92 KG/M2 | WEIGHT: 293 LBS | RESPIRATION RATE: 18 BRPM | TEMPERATURE: 98.8 F

## 2020-10-21 DIAGNOSIS — Z23 ENCOUNTER FOR IMMUNIZATION: ICD-10-CM

## 2020-10-21 DIAGNOSIS — K21.9 CHRONIC GERD: ICD-10-CM

## 2020-10-21 DIAGNOSIS — G47.33 OSA (OBSTRUCTIVE SLEEP APNEA): ICD-10-CM

## 2020-10-21 DIAGNOSIS — E11.9 TYPE 2 DIABETES MELLITUS WITHOUT COMPLICATION, WITHOUT LONG-TERM CURRENT USE OF INSULIN (HCC): Primary | ICD-10-CM

## 2020-10-21 DIAGNOSIS — E66.01 CLASS 3 SEVERE OBESITY WITHOUT SERIOUS COMORBIDITY WITH BODY MASS INDEX (BMI) OF 50.0 TO 59.9 IN ADULT, UNSPECIFIED OBESITY TYPE (HCC): ICD-10-CM

## 2020-10-21 DIAGNOSIS — J30.2 SEASONAL ALLERGIC RHINITIS, UNSPECIFIED TRIGGER: ICD-10-CM

## 2020-10-21 DIAGNOSIS — Z11.4 SCREENING FOR HIV (HUMAN IMMUNODEFICIENCY VIRUS): ICD-10-CM

## 2020-10-21 LAB — SL AMB POCT HEMOGLOBIN AIC: 6.7 (ref ?–6.5)

## 2020-10-21 PROCEDURE — 99214 OFFICE O/P EST MOD 30 MIN: CPT | Performed by: FAMILY MEDICINE

## 2020-10-21 PROCEDURE — 1036F TOBACCO NON-USER: CPT | Performed by: FAMILY MEDICINE

## 2020-10-21 PROCEDURE — 90686 IIV4 VACC NO PRSV 0.5 ML IM: CPT | Performed by: FAMILY MEDICINE

## 2020-10-21 PROCEDURE — 90471 IMMUNIZATION ADMIN: CPT | Performed by: FAMILY MEDICINE

## 2020-10-21 PROCEDURE — 3008F BODY MASS INDEX DOCD: CPT | Performed by: FAMILY MEDICINE

## 2020-10-21 PROCEDURE — 3044F HG A1C LEVEL LT 7.0%: CPT | Performed by: FAMILY MEDICINE

## 2020-10-21 PROCEDURE — 3725F SCREEN DEPRESSION PERFORMED: CPT | Performed by: FAMILY MEDICINE

## 2020-10-21 PROCEDURE — 83036 HEMOGLOBIN GLYCOSYLATED A1C: CPT | Performed by: FAMILY MEDICINE

## 2020-10-21 RX ORDER — FAMOTIDINE 20 MG/1
20 TABLET, FILM COATED ORAL 2 TIMES DAILY
Qty: 60 TABLET | Refills: 0 | Status: SHIPPED | OUTPATIENT
Start: 2020-10-21 | End: 2020-12-22

## 2020-12-21 DIAGNOSIS — I10 ESSENTIAL HYPERTENSION: ICD-10-CM

## 2020-12-21 DIAGNOSIS — E11.9 TYPE 2 DIABETES MELLITUS WITHOUT COMPLICATION, WITHOUT LONG-TERM CURRENT USE OF INSULIN (HCC): ICD-10-CM

## 2020-12-22 DIAGNOSIS — K21.9 CHRONIC GERD: ICD-10-CM

## 2020-12-22 RX ORDER — FAMOTIDINE 20 MG/1
TABLET, FILM COATED ORAL
Qty: 60 TABLET | Refills: 0 | Status: SHIPPED | OUTPATIENT
Start: 2020-12-22 | End: 2022-01-05 | Stop reason: SDUPTHER

## 2020-12-22 RX ORDER — LISINOPRIL AND HYDROCHLOROTHIAZIDE 12.5; 1 MG/1; MG/1
1 TABLET ORAL DAILY
Qty: 90 TABLET | Refills: 1 | Status: SHIPPED | OUTPATIENT
Start: 2020-12-22 | End: 2021-06-22

## 2021-03-26 DIAGNOSIS — J30.9 ALLERGIC RHINITIS, UNSPECIFIED SEASONALITY, UNSPECIFIED TRIGGER: ICD-10-CM

## 2021-03-28 RX ORDER — CETIRIZINE HYDROCHLORIDE 10 MG/1
10 TABLET ORAL
Qty: 90 TABLET | Refills: 1 | Status: SHIPPED | OUTPATIENT
Start: 2021-03-28 | End: 2021-09-20

## 2021-06-21 DIAGNOSIS — I10 ESSENTIAL HYPERTENSION: ICD-10-CM

## 2021-06-21 DIAGNOSIS — E11.9 TYPE 2 DIABETES MELLITUS WITHOUT COMPLICATION, WITHOUT LONG-TERM CURRENT USE OF INSULIN (HCC): ICD-10-CM

## 2021-06-22 RX ORDER — LISINOPRIL AND HYDROCHLOROTHIAZIDE 12.5; 1 MG/1; MG/1
TABLET ORAL
Qty: 90 TABLET | Refills: 1 | Status: SHIPPED | OUTPATIENT
Start: 2021-06-22 | End: 2022-01-03

## 2021-09-18 DIAGNOSIS — J30.9 ALLERGIC RHINITIS, UNSPECIFIED SEASONALITY, UNSPECIFIED TRIGGER: ICD-10-CM

## 2021-09-20 RX ORDER — CETIRIZINE HYDROCHLORIDE 10 MG/1
10 TABLET ORAL
Qty: 90 TABLET | Refills: 1 | Status: SHIPPED | OUTPATIENT
Start: 2021-09-20 | End: 2022-03-17

## 2021-09-28 LAB
LEFT EYE DIABETIC RETINOPATHY: NORMAL
RIGHT EYE DIABETIC RETINOPATHY: NORMAL

## 2021-12-16 DIAGNOSIS — E11.9 TYPE 2 DIABETES MELLITUS WITHOUT COMPLICATION, WITHOUT LONG-TERM CURRENT USE OF INSULIN (HCC): ICD-10-CM

## 2021-12-16 DIAGNOSIS — I10 ESSENTIAL HYPERTENSION: ICD-10-CM

## 2021-12-16 RX ORDER — LISINOPRIL AND HYDROCHLOROTHIAZIDE 12.5; 1 MG/1; MG/1
TABLET ORAL
Qty: 90 TABLET | Refills: 1 | OUTPATIENT
Start: 2021-12-16

## 2021-12-22 ENCOUNTER — OFFICE VISIT (OUTPATIENT)
Dept: FAMILY MEDICINE CLINIC | Facility: CLINIC | Age: 46
End: 2021-12-22

## 2021-12-22 VITALS
BODY MASS INDEX: 51.91 KG/M2 | HEIGHT: 63 IN | TEMPERATURE: 98.1 F | HEART RATE: 98 BPM | OXYGEN SATURATION: 100 % | WEIGHT: 293 LBS | RESPIRATION RATE: 18 BRPM | DIASTOLIC BLOOD PRESSURE: 82 MMHG | SYSTOLIC BLOOD PRESSURE: 130 MMHG

## 2021-12-22 DIAGNOSIS — E11.9 TYPE 2 DIABETES MELLITUS WITHOUT COMPLICATION, WITHOUT LONG-TERM CURRENT USE OF INSULIN (HCC): ICD-10-CM

## 2021-12-22 DIAGNOSIS — Z00.00 ANNUAL PHYSICAL EXAM: Primary | ICD-10-CM

## 2021-12-22 DIAGNOSIS — Z11.59 NEED FOR HEPATITIS C SCREENING TEST: ICD-10-CM

## 2021-12-22 DIAGNOSIS — Z12.31 ENCOUNTER FOR SCREENING MAMMOGRAM FOR BREAST CANCER: ICD-10-CM

## 2021-12-22 DIAGNOSIS — Z11.4 SCREENING FOR HIV (HUMAN IMMUNODEFICIENCY VIRUS): ICD-10-CM

## 2021-12-22 PROBLEM — R07.9 CHEST PAIN: Status: ACTIVE | Noted: 2021-12-22

## 2021-12-22 LAB
CREAT UR-MCNC: 72.8 MG/DL
MICROALBUMIN UR-MCNC: 8.2 MG/L (ref 0–20)
MICROALBUMIN/CREAT 24H UR: 11 MG/G CREATININE (ref 0–30)
SL AMB POCT HEMOGLOBIN AIC: 7.8 (ref ?–6.5)

## 2021-12-22 PROCEDURE — 82043 UR ALBUMIN QUANTITATIVE: CPT

## 2021-12-22 PROCEDURE — 3008F BODY MASS INDEX DOCD: CPT | Performed by: FAMILY MEDICINE

## 2021-12-22 PROCEDURE — 1036F TOBACCO NON-USER: CPT | Performed by: FAMILY MEDICINE

## 2021-12-22 PROCEDURE — 3725F SCREEN DEPRESSION PERFORMED: CPT | Performed by: FAMILY MEDICINE

## 2021-12-22 PROCEDURE — 99214 OFFICE O/P EST MOD 30 MIN: CPT | Performed by: FAMILY MEDICINE

## 2021-12-22 PROCEDURE — 3075F SYST BP GE 130 - 139MM HG: CPT | Performed by: FAMILY MEDICINE

## 2021-12-22 PROCEDURE — 83036 HEMOGLOBIN GLYCOSYLATED A1C: CPT | Performed by: FAMILY MEDICINE

## 2021-12-22 PROCEDURE — 82570 ASSAY OF URINE CREATININE: CPT

## 2021-12-22 PROCEDURE — 3061F NEG MICROALBUMINURIA REV: CPT | Performed by: FAMILY MEDICINE

## 2021-12-22 PROCEDURE — 99396 PREV VISIT EST AGE 40-64: CPT | Performed by: FAMILY MEDICINE

## 2021-12-22 PROCEDURE — 3079F DIAST BP 80-89 MM HG: CPT | Performed by: FAMILY MEDICINE

## 2021-12-22 PROCEDURE — 3051F HG A1C>EQUAL 7.0%<8.0%: CPT | Performed by: FAMILY MEDICINE

## 2021-12-23 ENCOUNTER — TELEPHONE (OUTPATIENT)
Dept: FAMILY MEDICINE CLINIC | Facility: CLINIC | Age: 46
End: 2021-12-23

## 2021-12-23 ENCOUNTER — TELEPHONE (OUTPATIENT)
Dept: ADMINISTRATIVE | Facility: OTHER | Age: 46
End: 2021-12-23

## 2021-12-23 NOTE — TELEPHONE ENCOUNTER
----- Message from Bernardo King MA sent at 12/22/2021  1:09 PM EST -----  12/22/21 1:09 PM    Lemuel, our patient Martina Zaman has had Diabetic Eye Exam completed/performed  Please assist in updating the patient chart by making an External outreach to [facility] located in [place], [place]  The date of service is a few months ago      Thank you,  Bernardo King MA  [facility]

## 2021-12-23 NOTE — TELEPHONE ENCOUNTER
Upon review of the In Basket request and the patient's chart, initial outreach has been made via fax, please see Contacts section for details       Thank you  Sosa Wilkerson

## 2021-12-23 NOTE — LETTER
Diabetic Eye Exam Form    Date Requested: 21  Patient: Morenita Boss  Patient : 1975   Referring Provider: Maricel Toth MD    Dilated Retinal Exam, Optomap-Iris Exam, or Fundus Photography Done         Yes ([place] one above)         No     Date of Diabetic Eye Exam ______________________________  Left Eye      Exam did show retinopathy    Exam did not show retinopathy         Mild       Moderate       None       Proliferative       Severe     Right Eye     Exam did show retinopathy    Exam did not show retinopathy         Mild       Moderate       None       Proliferative       Severe     Comments __________________________________________________________    Practice Providing Exam ______________________________________________    Exam Performed By (print name) _______________________________________      Provider Signature ___________________________________________________      These reports are needed for  compliance  Please fax this completed form and a copy of the Diabetic Eye Exam report to our office located at [place] [address] as soon as possible via [phone] sincere Mcdonald: Phone [phone]    We thank you for your assistance in treating our mutual patient

## 2021-12-23 NOTE — LETTER
Diabetic Eye Exam Form    Date Requested: 21  Patient: Socorro Mcgee  Patient : 1975   Referring Provider: Tamra Duverney, MD    Dilated Retinal Exam, Optomap-Iris Exam, or Fundus Photography Done         Yes ([place] one above)         No     Date of Diabetic Eye Exam ______________________________  Left Eye      Exam did show retinopathy    Exam did not show retinopathy         Mild       Moderate       None       Proliferative       Severe     Right Eye     Exam did show retinopathy    Exam did not show retinopathy         Mild       Moderate       None       Proliferative       Severe     Comments __________________________________________________________    Practice Providing Exam ______________________________________________    Exam Performed By (print name) _______________________________________      Provider Signature ___________________________________________________      These reports are needed for  compliance  Please fax this completed form and a copy of the Diabetic Eye Exam report to our office located at [place] [address] as soon as possible via [phone] sincere Land Sitter: Phone [phone]    We thank you for your assistance in treating our mutual patient

## 2021-12-27 NOTE — TELEPHONE ENCOUNTER
As a follow-up, a second attempt has been made for outreach via fax, please see Contacts section for details       [address] [facility]  [phone]    Thank you  Vannesa Montgomery

## 2022-01-03 DIAGNOSIS — I10 ESSENTIAL HYPERTENSION: ICD-10-CM

## 2022-01-03 RX ORDER — LISINOPRIL AND HYDROCHLOROTHIAZIDE 12.5; 1 MG/1; MG/1
TABLET ORAL
Qty: 90 TABLET | Refills: 1 | Status: SHIPPED | OUTPATIENT
Start: 2022-01-03 | End: 2022-06-23

## 2022-01-03 NOTE — TELEPHONE ENCOUNTER
As a final attempt, a third outreach has been made via telephone call  Please see Contacts section for details  This encounter will be closed and completed by end of day  Should we receive the requested information because of previous outreach attempts, the requested patient's chart will be updated appropriately       Thank you  Aleida Dewitt

## 2022-01-04 NOTE — TELEPHONE ENCOUNTER
Upon review of the In Basket request we were able to locate, review, and update the patient chart as requested for Diabetic Eye Exam     Any additional questions or concerns should be emailed to the Practice Liaisons via [email or web address] email, please do not reply via In Basket      Thank you  Edna Weldon

## 2022-01-05 DIAGNOSIS — K21.9 CHRONIC GERD: ICD-10-CM

## 2022-01-05 RX ORDER — FAMOTIDINE 20 MG/1
20 TABLET, FILM COATED ORAL 2 TIMES DAILY
Qty: 60 TABLET | Refills: 3 | Status: SHIPPED | OUTPATIENT
Start: 2022-01-05 | End: 2022-03-30

## 2022-01-17 ENCOUNTER — CLINICAL SUPPORT (OUTPATIENT)
Dept: FAMILY MEDICINE CLINIC | Facility: CLINIC | Age: 47
End: 2022-01-17

## 2022-01-17 DIAGNOSIS — E11.9 TYPE 2 DIABETES MELLITUS WITHOUT COMPLICATION, WITHOUT LONG-TERM CURRENT USE OF INSULIN (HCC): Primary | ICD-10-CM

## 2022-01-17 RX ORDER — PEN NEEDLE, DIABETIC 30 GX3/16"
NEEDLE, DISPOSABLE MISCELLANEOUS DAILY
Qty: 100 EACH | Refills: 2 | Status: SHIPPED | OUTPATIENT
Start: 2022-01-17

## 2022-02-14 DIAGNOSIS — E11.9 TYPE 2 DIABETES MELLITUS WITHOUT COMPLICATION, WITHOUT LONG-TERM CURRENT USE OF INSULIN (HCC): ICD-10-CM

## 2022-02-14 RX ORDER — LIRAGLUTIDE 6 MG/ML
INJECTION SUBCUTANEOUS
Qty: 9 ML | Refills: 1 | Status: SHIPPED | OUTPATIENT
Start: 2022-02-14 | End: 2022-06-22

## 2022-02-15 DIAGNOSIS — E11.9 TYPE 2 DIABETES MELLITUS WITHOUT COMPLICATION, WITHOUT LONG-TERM CURRENT USE OF INSULIN (HCC): ICD-10-CM

## 2022-02-18 NOTE — TELEPHONE ENCOUNTER
The pharmacy requested this on 2/15 but was sent to wrong office then sent to Dr Jovanna Rendon but hasn't been sent yet  Can someone review today please?  Pt almost out of med

## 2022-03-17 DIAGNOSIS — J30.9 ALLERGIC RHINITIS, UNSPECIFIED SEASONALITY, UNSPECIFIED TRIGGER: ICD-10-CM

## 2022-03-17 RX ORDER — CETIRIZINE HYDROCHLORIDE 10 MG/1
10 TABLET ORAL
Qty: 90 TABLET | Refills: 1 | Status: SHIPPED | OUTPATIENT
Start: 2022-03-17 | End: 2022-06-08

## 2022-03-19 ENCOUNTER — APPOINTMENT (OUTPATIENT)
Dept: LAB | Facility: HOSPITAL | Age: 47
End: 2022-03-19
Payer: COMMERCIAL

## 2022-03-19 DIAGNOSIS — E11.9 TYPE 2 DIABETES MELLITUS WITHOUT COMPLICATION, WITHOUT LONG-TERM CURRENT USE OF INSULIN (HCC): ICD-10-CM

## 2022-03-19 DIAGNOSIS — Z11.4 SCREENING FOR HIV (HUMAN IMMUNODEFICIENCY VIRUS): ICD-10-CM

## 2022-03-19 DIAGNOSIS — Z11.59 NEED FOR HEPATITIS C SCREENING TEST: ICD-10-CM

## 2022-03-19 LAB
ANION GAP SERPL CALCULATED.3IONS-SCNC: 4 MMOL/L (ref 4–13)
BUN SERPL-MCNC: 9 MG/DL (ref 5–25)
CALCIUM SERPL-MCNC: 9.1 MG/DL (ref 8.3–10.1)
CHLORIDE SERPL-SCNC: 103 MMOL/L (ref 100–108)
CHOLEST SERPL-MCNC: 167 MG/DL
CO2 SERPL-SCNC: 30 MMOL/L (ref 21–32)
CREAT SERPL-MCNC: 0.56 MG/DL (ref 0.6–1.3)
ERYTHROCYTE [DISTWIDTH] IN BLOOD BY AUTOMATED COUNT: 15.2 % (ref 11.6–15.1)
GFR SERPL CREATININE-BSD FRML MDRD: 110 ML/MIN/1.73SQ M
GLUCOSE P FAST SERPL-MCNC: 135 MG/DL (ref 65–99)
HCT VFR BLD AUTO: 38 % (ref 34.8–46.1)
HCV AB SER QL: NORMAL
HDLC SERPL-MCNC: 40 MG/DL
HGB BLD-MCNC: 11.8 G/DL (ref 11.5–15.4)
LDLC SERPL CALC-MCNC: 108 MG/DL (ref 0–100)
MCH RBC QN AUTO: 26 PG (ref 26.8–34.3)
MCHC RBC AUTO-ENTMCNC: 31.1 G/DL (ref 31.4–37.4)
MCV RBC AUTO: 84 FL (ref 82–98)
NONHDLC SERPL-MCNC: 127 MG/DL
PLATELET # BLD AUTO: 281 THOUSANDS/UL (ref 149–390)
PMV BLD AUTO: 12.4 FL (ref 8.9–12.7)
POTASSIUM SERPL-SCNC: 3.7 MMOL/L (ref 3.5–5.3)
RBC # BLD AUTO: 4.53 MILLION/UL (ref 3.81–5.12)
SODIUM SERPL-SCNC: 137 MMOL/L (ref 136–145)
TRIGL SERPL-MCNC: 93 MG/DL
WBC # BLD AUTO: 10.35 THOUSAND/UL (ref 4.31–10.16)

## 2022-03-19 PROCEDURE — 36415 COLL VENOUS BLD VENIPUNCTURE: CPT

## 2022-03-19 PROCEDURE — 87389 HIV-1 AG W/HIV-1&-2 AB AG IA: CPT

## 2022-03-19 PROCEDURE — 86803 HEPATITIS C AB TEST: CPT

## 2022-03-19 PROCEDURE — 80061 LIPID PANEL: CPT

## 2022-03-19 PROCEDURE — 85027 COMPLETE CBC AUTOMATED: CPT

## 2022-03-19 PROCEDURE — 80048 BASIC METABOLIC PNL TOTAL CA: CPT

## 2022-03-21 LAB — HIV 1+2 AB+HIV1 P24 AG SERPL QL IA: NORMAL

## 2022-03-30 DIAGNOSIS — K21.9 CHRONIC GERD: ICD-10-CM

## 2022-03-30 RX ORDER — FAMOTIDINE 20 MG/1
TABLET, FILM COATED ORAL
Qty: 180 TABLET | Refills: 1 | Status: SHIPPED | OUTPATIENT
Start: 2022-03-30

## 2022-04-01 ENCOUNTER — TELEPHONE (OUTPATIENT)
Dept: FAMILY MEDICINE CLINIC | Facility: CLINIC | Age: 47
End: 2022-04-01

## 2022-04-06 ENCOUNTER — OFFICE VISIT (OUTPATIENT)
Dept: FAMILY MEDICINE CLINIC | Facility: CLINIC | Age: 47
End: 2022-04-06

## 2022-04-06 VITALS
TEMPERATURE: 98.1 F | RESPIRATION RATE: 18 BRPM | WEIGHT: 293 LBS | OXYGEN SATURATION: 98 % | SYSTOLIC BLOOD PRESSURE: 121 MMHG | HEIGHT: 63 IN | BODY MASS INDEX: 51.91 KG/M2 | HEART RATE: 89 BPM | DIASTOLIC BLOOD PRESSURE: 81 MMHG

## 2022-04-06 DIAGNOSIS — E11.9 TYPE 2 DIABETES MELLITUS WITHOUT COMPLICATION, WITHOUT LONG-TERM CURRENT USE OF INSULIN (HCC): Primary | ICD-10-CM

## 2022-04-06 LAB — SL AMB POCT HEMOGLOBIN AIC: 6.8 (ref ?–6.5)

## 2022-04-06 PROCEDURE — 99213 OFFICE O/P EST LOW 20 MIN: CPT | Performed by: FAMILY MEDICINE

## 2022-04-06 PROCEDURE — 3044F HG A1C LEVEL LT 7.0%: CPT | Performed by: FAMILY MEDICINE

## 2022-04-06 PROCEDURE — 3008F BODY MASS INDEX DOCD: CPT | Performed by: FAMILY MEDICINE

## 2022-04-06 PROCEDURE — 83036 HEMOGLOBIN GLYCOSYLATED A1C: CPT | Performed by: FAMILY MEDICINE

## 2022-04-06 PROCEDURE — 3074F SYST BP LT 130 MM HG: CPT | Performed by: FAMILY MEDICINE

## 2022-04-06 PROCEDURE — 3079F DIAST BP 80-89 MM HG: CPT | Performed by: FAMILY MEDICINE

## 2022-04-06 NOTE — PATIENT INSTRUCTIONS
Diabetes and Exercise   WHAT YOU NEED TO KNOW:   Physical activity, such as exercise, can help keep your blood sugar level steady or improve insulin resistance  Activity can help decrease your risk for heart disease, and help you lose weight  Exercise can also help lower your A1c  Your diabetes care team will help you create an exercise plan  The plan will be based on the type of diabetes you have and your starting fitness level  DISCHARGE INSTRUCTIONS:   Call your local emergency number (911 in the [address] [facility] [facility]) if:   · You have chest pain or shortness of breath  Return to the emergency department if:   · You have a low blood sugar level and it does not improve with treatment  Symptoms are trouble thinking, a pounding heartbeat, and sweating  · Your blood sugar level is above 240 mg/dL and does not come down within 15 minutes of treatment  · You have blurred or double vision  · Your breath has a fruity, sweet smell, or your breathing is shallow  Call your doctor or diabetes care team if:   · You have ketones in your blood or urine  · You have a fever  · Your blood sugar levels are higher than your target goals  · You often have low blood sugar levels  · Your skin is red, dry, warm, or swollen  · You have a wound that does not heal     · You have trouble coping with diabetes, or you feel anxious or depressed  · You have questions or concerns about your condition or care  Tips to help you create and meet your exercise goals:   · Set a goal for 150 minutes (2 5 hours) of moderate to vigorous aerobic activity each week  Aerobic activity helps your heart stay strong  Aerobic activity includes walking, bicycling, dancing, swimming, and raking leaves  Spread aerobic activity over 3 to 5 days  Do not take more than 2 days off in a row  It is best to do at least 10 minutes at a time and 30 minutes each day  You can work up to these goals  Remember that any activity is better than no activity  Over time, you can make exercise more intense or last longer  You can also add more days of exercise as your fitness level improves  Your diabetes care team can help you make a step-by-step plan to achieve your goals  · Set a strength training goal of 2 to 3 times a week  Take at least 1 day off in between strength training sessions  Strength training helps you keep the muscles you have and build new muscles  Strength training includes lifting weights, climbing stairs, yoga, and emily chi          · Older adults should include balance training 2 to 3 times each week  These include walking backwards, standing on one foot, and walking heel to toe in a straight line  Other healthy exercise tips:   · Stretch before and after you exercise to prevent injury  · Drink water or liquids that do not contain sugar before, during, and after exercise  Ask your dietitian or healthcare provider which liquids you should drink when you exercise  · Do not sit for longer than 30 minutes at a time during your day  If you cannot walk around, at least stand up  This will help you stay active and keep your blood circulating  Exercise and blood sugar levels:  Check your blood sugar level before and after exercise, if you use insulin  Healthcare providers may tell you to change the amount of insulin you take or food you eat  · If your blood sugar level is high, check your blood or urine for ketones before you exercise  Do not exercise if your blood sugar level is high and you have ketones  · If your blood sugar level is less than 100 mg/dL, have a carbohydrate snack before you exercise  Examples are 4 to 6 crackers, ½ banana, 8 ounces (1 cup) of milk, or 4 ounces (½ cup) of juice  Follow up with your doctor or diabetes care team as directed:  Write down your questions so you remember to ask them during your visits    © Copyright [address] 2022 Information is for End User's use only and may not be sold, redistributed or otherwise used for commercial purposes  All illustrations and images included in CareNotes® are the copyrighted property of A D A M , Inc  or Cristina Burgess  The above information is an  only  It is not intended as medical advice for individual conditions or treatments  Talk to your doctor, nurse or pharmacist before following any medical regimen to see if it is safe and effective for you

## 2022-04-06 NOTE — ASSESSMENT & PLAN NOTE
Controlled  HA1C 6 8 today, 04/06/22  Prior HgbA1C 7 8 on 12/22/2021  Home regimen: Metformin 500 mg BID and Victoza daily  Patient states she is compliant w/ medication   - Fundoscopy: Performed in 2021 from outside [facility]  - Urine MicroAlb:Cr wnl 12/22/21  - Diabetic foot exam: normal 58/23/91  - Complications: none  - Weight is stable at 139Kg since last visit on 12/22/21  Has appt with Bariatic surgery on 5/10/22    - Discussed general issues diabetes management, diet control and exercise regimen    - Continue Home regimen  F/u in 3 months         Lab Results   Component Value Date/Time    HGBA1C 7 8 (A) 12/22/2021 01:25 PM    GLUC 174 02/15/2019 10:03 AM    BUN 9 03/19/2022 10:13 AM    CREATININE 0 56 (L) 03/19/2022 10:13 AM    CHOLESTEROL 167 03/19/2022 10:13 AM    TRIG 93 03/19/2022 10:13 AM    HDL 40 (L) 03/19/2022 10:13 AM    LDLCALC 108 (H) 03/19/2022 10:13 AM    CREATININEUR 72 8 12/22/2021 01:39 PM    LABMICR 8 2 12/22/2021 01:39 PM    MICROALBCRE 11 12/22/2021 01:39 PM

## 2022-04-07 NOTE — PROGRESS NOTES
Assessment/Plan:    Type 2 diabetes mellitus without complication, without long-term current use of insulin (HCC)  Controlled  HA1C 7 8 today, 04/06/22  Prior HgbA1C 7 8 on 12/22/2021  Home regimen: Metformin 500 mg BID and Victoza daily  Patient states she is compliant w/ medication   - Fundoscopy: Performed in 2021 from outside [facility]  - Urine MicroAlb:Cr wnl 12/22/21  - Diabetic foot exam: normal 88/42/00  - Complications: none  - Weight is stable at 139Kg since last visit on 12/22/21  Has appt with Bariatic surgery on 5/10/22    - Discussed general issues diabetes management, diet control and exercise regimen    - Added Victoza   6ml injections  Asked patient to schedule nursing visit to learn how to perform the injections  F/u in 3 months  Lab Results   Component Value Date/Time    HGBA1C 7 8 (A) 12/22/2021 01:25 PM    GLUC 174 02/15/2019 10:03 AM    BUN 9 03/19/2022 10:13 AM    CREATININE 0 56 (L) 03/19/2022 10:13 AM    CHOLESTEROL 167 03/19/2022 10:13 AM    TRIG 93 03/19/2022 10:13 AM    HDL 40 (L) 03/19/2022 10:13 AM    LDLCALC 108 (H) 03/19/2022 10:13 AM    CREATININEUR 72 8 12/22/2021 01:39 PM    LABMICR 8 2 12/22/2021 01:39 PM    MICROALBCRE 11 12/22/2021 01:39 PM         Patient denies Tdap and influenza vaccine at this time  Diagnoses and all orders for this visit:    Type 2 diabetes mellitus without complication, without long-term current use of insulin (HCC)  -     POCT hemoglobin A1c          Subjective:      Patient ID: Kulwinder Bhat is a 48 y o  female  Kulwinder Bhat presents to the clinic to follow up on Type 2 Diabetes  Patient says she is compliant with medications and is not experiencing any adverse effects  Patient is looking forward to bariatric appointment scheduled on 5/10/22  Patient has no additional concerns today        The following portions of the patient's history were reviewed and updated as appropriate: current medications, past medical history and past social history  Review of Systems   Constitutional: Negative for fever and unexpected weight change  Respiratory: Negative for cough and shortness of breath  Cardiovascular: Negative for chest pain and palpitations  Neurological: Negative for syncope and light-headedness  Objective:      /81 (BP Location: Left arm, Patient Position: Sitting, Cuff Size: Large)   Pulse 89   Temp 98 1 °F (36 7 °C) (Temporal)   Resp 18   Ht 5' 3" (1 6 m)   Wt 136 kg (300 lb)   SpO2 98%   BMI 53 14 kg/m²          Physical Exam  Constitutional:       Appearance: She is normal weight  HENT:      Head: Normocephalic and atraumatic  Right Ear: External ear normal       Left Ear: External ear normal       Mouth/Throat:      Mouth: Mucous membranes are dry  Pharynx: Oropharynx is clear  Cardiovascular:      Rate and Rhythm: Normal rate and regular rhythm  Heart sounds: Normal heart sounds  No murmur heard  No gallop  Pulmonary:      Effort: Pulmonary effort is normal  No respiratory distress  Skin:     General: Skin is warm and dry  Neurological:      General: No focal deficit present  Mental Status: She is alert

## 2022-05-10 ENCOUNTER — OFFICE VISIT (OUTPATIENT)
Dept: BARIATRICS | Facility: CLINIC | Age: 47
End: 2022-05-10

## 2022-05-10 VITALS
HEART RATE: 108 BPM | DIASTOLIC BLOOD PRESSURE: 96 MMHG | HEIGHT: 61 IN | BODY MASS INDEX: 55.32 KG/M2 | WEIGHT: 293 LBS | SYSTOLIC BLOOD PRESSURE: 118 MMHG | TEMPERATURE: 98.9 F

## 2022-05-10 DIAGNOSIS — R07.9 CHEST PAIN: ICD-10-CM

## 2022-05-10 DIAGNOSIS — Z01.818 PRE-OPERATIVE CLEARANCE: ICD-10-CM

## 2022-05-10 DIAGNOSIS — E66.01 MORBID OBESITY (HCC): Primary | ICD-10-CM

## 2022-05-10 DIAGNOSIS — G47.33 OSA (OBSTRUCTIVE SLEEP APNEA): ICD-10-CM

## 2022-05-10 DIAGNOSIS — K21.9 CHRONIC GERD: ICD-10-CM

## 2022-05-10 DIAGNOSIS — E11.9 TYPE 2 DIABETES MELLITUS WITHOUT COMPLICATION, WITHOUT LONG-TERM CURRENT USE OF INSULIN (HCC): ICD-10-CM

## 2022-05-10 DIAGNOSIS — R53.82 CHRONIC FATIGUE: ICD-10-CM

## 2022-05-10 DIAGNOSIS — I10 ESSENTIAL HYPERTENSION: ICD-10-CM

## 2022-05-10 DIAGNOSIS — R01.1 HEART MURMUR: ICD-10-CM

## 2022-05-10 PROCEDURE — RECHECK: Performed by: DIETITIAN, REGISTERED

## 2022-05-10 NOTE — PROGRESS NOTES
Bariatric Nutrition Assessment Note    Type of surgery    Preop  Surgery Date: TBD- pt has 12 session program requirement     Surgeon: Dr Cassie Sanchez  50 y o   female     Wt with BMI of 25: 132 4#  Pre-Op Excess Wt: 166 6 lbs  /96 (BP Location: Left arm, Patient Position: Sitting, Cuff Size: Standard)   Pulse (!) 108   Temp 98 9 °F (37 2 °C) (Tympanic)   Ht 5' 1 2" (1 554 m)   Wt 136 kg (299 lb)   BMI 56 13 kg/m²     [place] Equation:  1922 kcal per day   Estimated calories for weight loss 3204-8749 kcal per day  ( 1-2# per wk wt loss - sedentary )  Estimated protein needs 72-90 grams per day (1 2-1 5 gms/kg IBW )   Estimated fluid needs 70 ounces per day (35 ml/kg IBW )      Weight History   Onset of Obesity: Childhood- got worse after birth of her youngest daughter 12 years ago   Family history of obesity: Yes- 2 cousins had surgery   Wt Loss Attempts: eliminated soda, started decreasing portions, atkins diet, diabetic diet, exercise  prescribed Victoza to help decrease her weight   Prescription pills prescribed by doctor 20 years ago but stopped due to side effects     Patient has tried the above for 6 months or more with insufficient weight loss or weight regain, which is why patient has requested to be evaluated for weight loss surgery today  Maximum Wt Lost: 20 pounds with prescription pills   Highest weight was 315#      Review of History and Medications   Past Medical History:   Diagnosis Date    Hypertension     Pre-diabetes      Past Surgical History:   Procedure Laterality Date     SECTION      TUBAL LIGATION       Social History     Socioeconomic History    Marital status: Single     Spouse name: None    Number of children: 3    Years of education: None    Highest education level: None   Occupational History    Occupation: Behavioral health field   Tobacco Use    Smoking status: Never Smoker    Smokeless tobacco: Never Used   Vaping Use    Vaping Use: Never used   Substance and Sexual Activity    Alcohol use: Never    Drug use: Never    Sexual activity: Not Currently     Birth control/protection: Female Sterilization   Other Topics Concern    None   Social History Narrative    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     Social Determinants of Health     Financial Resource Strain: Low Risk     Difficulty of Paying Living Expenses: Not very hard   Food Insecurity: No Food Insecurity    Worried About Running Out of Food in the Last Year: Never true    Melany of Food in the Last Year: Never true   Transportation Needs: Not on file   Physical Activity: Not on file   Stress: Not on file   Social Connections: Not on file   Intimate Partner Violence: Not on file   Housing Stability: Not on file       Current Outpatient Medications:     cetirizine (ZyrTEC) 10 mg tablet, TAKE 1 TABLET (10 MG TOTAL) BY MOUTH DAILY AT BEDTIME AS NEEDED FOR ALLERGIES, Disp: 90 tablet, Rfl: 1    famotidine (PEPCID) 20 mg tablet, TAKE 1 TABLET BY MOUTH TWICE A DAY, Disp: 180 tablet, Rfl: 1    Insulin Pen Needle (Pen Needles) 31G X 5 MM MISC, Use in the morning, Disp: 100 each, Rfl: 2    lisinopril-hydrochlorothiazide (PRINZIDE,ZESTORETIC) 10-12 5 MG per tablet, TAKE 1 TABLET BY MOUTH EVERY DAY, Disp: 90 tablet, Rfl: 1    metFORMIN (GLUCOPHAGE) 500 mg tablet, Take 1 tablet (500 mg total) by mouth 2 (two) times a day with meals, Disp: 180 tablet, Rfl: 1    Victoza injection, INJECT 0 6 MG UNDER THE SKIN ONCE DAILY, Disp: 9 mL, Rfl: 1  Food Intake and Lifestyle Assessment   Food Intake Assessment completed via usual diet recall  Coffee   Breakfast: 8:30 to 9 am Flaco fine   Snack: 0   Lunch: 2 pm - sandwich or something fast so she can continue to work or macaroni and cheese   Sometimes skips lunch   Snack: 0  Dinner: 6 to 6:30 pm   Rice, meat, potatoes, sometimes lettuce or corn but not usually vegetables   Snack: will eat a sweet - such as cookie or ice cream cake   Beverage intake: water and stopped soda, drinks juice- diet   Protein supplement: no  Estimated protein intake per day: 40 grams   Estimated fluid intake per day: 48 ounces or more of water per day, and drinks juice with dinner 8-16 ounces   Meals eaten away from home: twice per week   Typical meal pattern: 2-3meals per day and 0-1 snacks per day  Eating Behaviors: Mindless eating, Emotional eating and Craves sweet foods  Food allergies or intolerances: No Known Allergies  Cultural or Shinto considerations: n/a     Physical Assessment    Lab Results   Component Value Date    BA1C 6 8 (A) 04/06/2022     Physical Activity  Types of exercise: None  Current physical limitations: foot pain, back pain, joint pain , SOB    Psychosocial Assessment   Support systems: daughter   Socioeconomic factors: works from home 40 hours per week   Lives with 12year old daughter and  Her older daughter and grandchild     Nutrition Diagnosis  Diagnosis: Overweight / Obesity (NC-3 3)  Related to: Food and nutrition-related knowledge deficit, Physical inactivity and Excessive energy intake  As Evidenced by: BMI >25, Excessive energy intake and Unintentional weight gain     Nutrition Prescription: Recommend the following diet  1500 kcal / 95 grams protein / 150 grams of carb/ 58 grams of fat, 15 grams of fiber   70 ounces of calorie free beverages     Interventions and Teaching   Discussed pre-op and post-op nutrition guidelines  Patient educated and handouts provided    Surgical changes to stomach / GI  Capacity of post-surgery stomach  Diet progression  Adequate hydration  Sugar and fat restriction to decrease "dumping syndrome"  Fat restriction to decrease steatorrhea  Expected weight loss  Weight loss plateaus/ possibility of weight regain  Exercise  Suggestions for pre-op diet  Nutrition considerations after surgery  Protein supplements  Meal planning and preparation  Appropriate carbohydrate, protein, and fat intake, and food/fluid choices to maximize safe weight loss, nutrient intake, and tolerance   Dietary and lifestyle changes  Possible problems with poor eating habits  Intuitive eating  Techniques for self monitoring and keeping daily food journal  Potential for food intolerance after surgery, and ways to deal with them including: lactose intolerance, nausea, reflux, vomiting, diarrhea, food intolerance, appetite changes, gas  Vitamin / Mineral supplementation of Multivitamin with minerals, Calcium, Vitamin B12, Iron, Fat Soluble vitamins and Vitamin D    Patient is not currently pregnant and doesn't desire to become pregnant a minimum of one year post-op    Education provided to: patient    Barriers to learning: No barriers identified    Readiness to change: preparation    Prior research on procedure: discussed with provider and pre-op class    Comprehension: needs reinforcement and verbalizes understanding     Expected Compliance: good  Recommendations  Pt is an appropriate candidate for surgery  Yes  Pt is aware that she needs to lose 10% of her body prior to surgery date       Evaluation / Monitoring  Dietitian to Monitor: Eating pattern as discussed Tolerance of nutrition prescription Body weight Lab values Physical activity Bowel pattern    Goals  Eliminate sugar sweetened beverages, Food journal, Complete lession plans 1-6, Eat 3 meals per day and Eliminate mindless snacking  Take 2000 IU of vitamin D3 per day   Eat off smaller plate 8-9 inches  Food log 1500 calories   Increase calorie free fluids to 70 ounces per day   Gym 1-2 x per week for 30 minutes ( plans to join with her daughter )     Time Spent:   1 Hour

## 2022-05-10 NOTE — PATIENT INSTRUCTIONS
Goals  Take 2000 IU of vitamin D3 per day   Eat off smaller plate 8-9 inches  Food log 1500 calories   Increase calorie free fluids to 70 ounces per day

## 2022-05-16 ENCOUNTER — TELEPHONE (OUTPATIENT)
Dept: FAMILY MEDICINE CLINIC | Facility: CLINIC | Age: 47
End: 2022-05-16

## 2022-05-16 DIAGNOSIS — E66.01 MORBID OBESITY (HCC): Primary | ICD-10-CM

## 2022-05-16 NOTE — TELEPHONE ENCOUNTER
Reyne Crigler from [address] [place] [address] Sleep Medicine calling needs dr chacon put in to [address] [place] [address] Rd   Has appt tomorrow 5/17, Dx [phone]    Not for sleep test just has to be for sleep medicine

## 2022-05-17 ENCOUNTER — APPOINTMENT (OUTPATIENT)
Dept: LAB | Facility: HOSPITAL | Age: 47
End: 2022-05-17
Attending: SURGERY
Payer: COMMERCIAL

## 2022-05-17 ENCOUNTER — OFFICE VISIT (OUTPATIENT)
Dept: SLEEP CENTER | Facility: CLINIC | Age: 47
End: 2022-05-17
Payer: COMMERCIAL

## 2022-05-17 VITALS
WEIGHT: 293 LBS | HEIGHT: 61 IN | DIASTOLIC BLOOD PRESSURE: 70 MMHG | BODY MASS INDEX: 55.32 KG/M2 | OXYGEN SATURATION: 94 % | SYSTOLIC BLOOD PRESSURE: 120 MMHG | HEART RATE: 100 BPM

## 2022-05-17 DIAGNOSIS — G47.33 OSA (OBSTRUCTIVE SLEEP APNEA): ICD-10-CM

## 2022-05-17 DIAGNOSIS — E11.9 TYPE 2 DIABETES MELLITUS WITHOUT COMPLICATION, WITHOUT LONG-TERM CURRENT USE OF INSULIN (HCC): ICD-10-CM

## 2022-05-17 DIAGNOSIS — I10 ESSENTIAL HYPERTENSION: ICD-10-CM

## 2022-05-17 DIAGNOSIS — R07.9 CHEST PAIN: ICD-10-CM

## 2022-05-17 DIAGNOSIS — Z01.818 PRE-OPERATIVE CLEARANCE: ICD-10-CM

## 2022-05-17 DIAGNOSIS — K21.9 CHRONIC GERD: ICD-10-CM

## 2022-05-17 DIAGNOSIS — R53.82 CHRONIC FATIGUE: ICD-10-CM

## 2022-05-17 DIAGNOSIS — R01.1 HEART MURMUR: ICD-10-CM

## 2022-05-17 DIAGNOSIS — E66.01 MORBID OBESITY (HCC): ICD-10-CM

## 2022-05-17 LAB
T4 FREE SERPL-MCNC: 1.1 NG/DL (ref 0.76–1.46)
TSH SERPL DL<=0.05 MIU/L-ACNC: 0.17 UIU/ML (ref 0.45–4.5)

## 2022-05-17 PROCEDURE — 84443 ASSAY THYROID STIM HORMONE: CPT

## 2022-05-17 PROCEDURE — 84439 ASSAY OF FREE THYROXINE: CPT

## 2022-05-17 PROCEDURE — 36415 COLL VENOUS BLD VENIPUNCTURE: CPT

## 2022-05-17 PROCEDURE — 99213 OFFICE O/P EST LOW 20 MIN: CPT | Performed by: INTERNAL MEDICINE

## 2022-05-17 NOTE — PROGRESS NOTES
Progress Note - Sleep Center   Catha Dakins [facility]: MRN: [id number]      Reason for Visit:  50 y  o female here for annual follow-up    Assessment:  The patient is not using her CPAP of 6 cm due to the recall  Her AHI = 6 5  She feels no better or worse since stopping use of the device  Plan:  The patient will restart use of CPAP when she receives her new machine from the   She is contemplating bariatric surgery and will likely no longer need CPAP once she loses weight  Follow up: One year    History of Present Illness:  History of ANGE on PAP therapy  Planning for bariatric surgery      Review of Systems      Genitourinary need to urinate more than twice a night   Cardiology ankle/leg swelling   Gastrointestinal frequent heartburn/acid reflux   Neurology none   Constitutional fatigue   Integumentary none   Psychiatry none   Musculoskeletal sciatica   Pulmonary shortness of breath with activity, frequent cough and snoring   ENT throat clearing   Endocrine frequent urination   Hematological none           I have reviewed and updated the review of systems as necessary      Historical Information    Past Medical History:   Past Medical History:   Diagnosis Date    Hypertension     Pre-diabetes          Past Surgical History:   Past Surgical History:   Procedure Laterality Date     SECTION      TUBAL LIGATION         Social History:   Social History     Socioeconomic History    Marital status: Single     Spouse name: None    Number of children: 3    Years of education: None    Highest education level: None   Occupational History    Occupation: Behavioral health field   Tobacco Use    Smoking status: Never Smoker    Smokeless tobacco: Never Used   Vaping Use    Vaping Use: Never used   Substance and Sexual Activity    Alcohol use: Never    Drug use: Never    Sexual activity: Not Currently     Birth control/protection: Female Sterilization   Other Topics Concern    None Social History Narrative    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     Social Determinants of Health     Financial Resource Strain: Low Risk     Difficulty of Paying Living Expenses: Not very hard   Food Insecurity: No Food Insecurity    Worried About Running Out of Food in the Last Year: Never true    Melany of Food in the Last Year: Never true   Transportation Needs: Not on file   Physical Activity: Not on file   Stress: Not on file   Social Connections: Not on file   Intimate Partner Violence: Not on file   Housing Stability: Not on file       Family History:   Family History   Problem Relation Age of Onset    Hypertension Mother     Diabetes Mother     Hypertension Father     Diabetes Father     Cancer Maternal Grandmother     Diabetes Maternal Grandfather        Medications/Allergies:      Current Outpatient Medications:     cetirizine (ZyrTEC) 10 mg tablet, TAKE 1 TABLET (10 MG TOTAL) BY MOUTH DAILY AT BEDTIME AS NEEDED FOR ALLERGIES, Disp: 90 tablet, Rfl: 1    famotidine (PEPCID) 20 mg tablet, TAKE 1 TABLET BY MOUTH TWICE A DAY, Disp: 180 tablet, Rfl: 1    Insulin Pen Needle (Pen Needles) 31G X 5 MM MISC, Use in the morning, Disp: 100 each, Rfl: 2    lisinopril-hydrochlorothiazide (PRINZIDE,ZESTORETIC) 10-12 5 MG per tablet, TAKE 1 TABLET BY MOUTH EVERY DAY, Disp: 90 tablet, Rfl: 1    metFORMIN (GLUCOPHAGE) 500 mg tablet, Take 1 tablet (500 mg total) by mouth 2 (two) times a day with meals, Disp: 180 tablet, Rfl: 1    Victoza injection, INJECT 0 6 MG UNDER THE SKIN ONCE DAILY, Disp: 9 mL, Rfl: 1          Objective      Vital Signs:   Vitals:    05/17/22 1038   BP: 120/70   Pulse: 100   SpO2: 94%     [place] Sleepiness Scale: Total score: 6        Physical Exam:    General: Alert, appropriate, cooperative, overweight    Head: NC/AT    Skin: Warm, dry    Neuro: No motor abnormalities, cranial nerves appear intact    Extremity: No clubbing, cyanosis      DME Provider:   [facility]        Counseling / Coordination of Care   I have spent 10 minutes with the patient today in which greater than 50% of this time was spent in counseling/coordination of care regarding: equipment and compliance  Board Certified Sleep Specialist    Portions of the record may have been created with voice recognition software  Occasional wrong word or "sound a like" substitutions may have occurred due to the inherent limitations of voice recognition software  Read the chart carefully and recognize, using context, where substitutions have occurred

## 2022-05-18 ENCOUNTER — OFFICE VISIT (OUTPATIENT)
Dept: BARIATRICS | Facility: CLINIC | Age: 47
End: 2022-05-18
Payer: COMMERCIAL

## 2022-05-18 VITALS
BODY MASS INDEX: 55.32 KG/M2 | HEIGHT: 61 IN | HEART RATE: 98 BPM | DIASTOLIC BLOOD PRESSURE: 80 MMHG | WEIGHT: 293 LBS | SYSTOLIC BLOOD PRESSURE: 122 MMHG | TEMPERATURE: 98.3 F

## 2022-05-18 DIAGNOSIS — I10 ESSENTIAL HYPERTENSION: ICD-10-CM

## 2022-05-18 DIAGNOSIS — K21.9 CHRONIC GERD: Primary | ICD-10-CM

## 2022-05-18 DIAGNOSIS — G47.33 OSA (OBSTRUCTIVE SLEEP APNEA): ICD-10-CM

## 2022-05-18 DIAGNOSIS — E66.01 MORBID (SEVERE) OBESITY DUE TO EXCESS CALORIES (HCC): ICD-10-CM

## 2022-05-18 DIAGNOSIS — E11.9 TYPE 2 DIABETES MELLITUS WITHOUT COMPLICATION, WITHOUT LONG-TERM CURRENT USE OF INSULIN (HCC): ICD-10-CM

## 2022-05-18 PROCEDURE — 99213 OFFICE O/P EST LOW 20 MIN: CPT | Performed by: NURSE PRACTITIONER

## 2022-05-18 PROCEDURE — 1036F TOBACCO NON-USER: CPT | Performed by: NURSE PRACTITIONER

## 2022-05-18 PROCEDURE — 3008F BODY MASS INDEX DOCD: CPT | Performed by: NURSE PRACTITIONER

## 2022-05-18 PROCEDURE — 3008F BODY MASS INDEX DOCD: CPT | Performed by: FAMILY MEDICINE

## 2022-05-18 NOTE — PROGRESS NOTES
Assessment/Plan:    Diagnoses and all orders for this visit:    Chronic GERD - takes pepcid BID PRN  Reports having reflux on a daily basis but only takes once daily  Advised to take twice daily as prescribed by her PCP  Type 2 diabetes mellitus without complication, without long-term current use of insulin (HCC) - takes metformin BID and victoza  Advised patient to monitor her blood sugar at home  Review signs of hypoglycemia and hyperglycemia  ANGE (obstructive sleep apnea) follows with sleep medicine and is on the CPAP  Had recall but patient states she was told she is able to use it  Advised to bring CPAP with her to the hospital on day of surgery  Essential hypertension - BP within limits in office today  On lisinopril-hydrochlorothiazide 10-12 5 mg PO QD  Advised to get BP cuff to monitor her BP at home  Morbid (severe) obesity due to excess calories ([address] )       -  Interested in [facility] Gastric Bypass with Dr Joanne Reynolds  10% Weight loss-Not Completed  - goal on day of surgery is 269 LBS  Screening labs-Completed - need reorder of CBC and CMP at later time closer to surgery date  Support Group-Not Completed  Cardiac Risk Assessment-Not completed  Upper Endoscopy-Not Completed  Sleep Medicine Assessment- Not applicable - follows with sleep medicine and has a CPAP machine  Advised to bring machine with her on day of surgery  Alcohol and nicotine use is not recommended following bariatric surgery  NSAIDs should be avoided following bariatric surgery  Advised that pregnancy should be avoided following bariatric surgery for 12-18 months and should not solely rely on OCP and consider additional/alternative sources for contraception due to potential absorption issues-Completed    Follow up in approximately 2 weeks  Goals:  Food log (ie ) [http://www  [employer],sparkpeople  com,loseit com,calorieking  com,etc]www  myfitnesspal com,sparkpeople  com,loseit com,calorieking  com,etc  baritastic  No sugary beverages  At least 64oz of water daily  Increase physical activity by 10 minutes daily  Gradually increase physical activity to a goal of 5 days per week for 30 minutes of MODERATE intensity PLUS 2 days per week of FULL BODY resistance training  Follow lessons 1-6 in the manual  Follow the 30/60 minute rule  Goal protein intake of 60-80 grams per day  Alcohol and nicotine use is not recommended following bariatric surgery  NSAIDs (Motrin, Advil, Aleve, Naproxen, ibuprofen, etc) should be avoided following bariatric surgery)    Subjective:   Chief Complaint   Patient presents with    Follow-up     Post- op Pg  Patient ID: Mary Steiner is a 50 y o  female with excess weight/obesity here to pursue weight management  Past Medical History:   Diagnosis Date    Hypertension     Pre-diabetes      HPI:  The patient has been:  Following the lessons in the manual- yes  Practicing the 30/60 minute rule- no - advised to start  Food logging- no - advise to start - use baristatic or myfitnesspal  Exercise- no  Alcohol- no  Tobacco- no  NSAIDs- no  The following portions of the patient's history were reviewed and updated as appropriate: allergies, current medications, past family history, past medical history, past social history, past surgical history and problem list   Review of Systems   Constitutional: Positive for fatigue (with activity)  Respiratory: Positive for shortness of breath (with activity)  Cardiovascular: Negative  Gastrointestinal: Negative for nausea and vomiting  Reflux   Musculoskeletal: Negative  Neurological: Negative  Psychiatric/Behavioral: Negative  Objective:  /80   Pulse 98   Temp 98 3 °F (36 8 °C) (Tympanic)   Ht 5' 1 2" (1 554 m)   Wt 135 kg (298 lb)   LMP 04/18/2022   BMI 55 94 kg/m²   Physical Exam  Vitals and nursing note reviewed  Constitutional:       Appearance: Normal appearance  She is obese     Cardiovascular:      Rate and Rhythm: Normal rate and regular rhythm  Pulses: Normal pulses  Heart sounds: Normal heart sounds  Comments: Reports she has a heart murmur however unable to ausculate during this visit  Pulmonary:      Effort: Pulmonary effort is normal       Breath sounds: Normal breath sounds  Abdominal:      General: Bowel sounds are normal       Palpations: Abdomen is soft  Musculoskeletal:         General: Normal range of motion  Skin:     General: Skin is warm and dry  Neurological:      General: No focal deficit present  Mental Status: She is alert and oriented to person, place, and time  Psychiatric:         Mood and Affect: Mood normal          Behavior: Behavior normal          Thought Content:  Thought content normal          Judgment: Judgment normal

## 2022-05-23 NOTE — PROGRESS NOTES
Behavioral Health Follow Up Note:      2 / 12  Weight Check:  Dr Joanne Reynolds    Starting weight 299  #  Today's weight  297 7   #  Surgery goal   269  #    Surgery month:  Aug/Sept  Surgery deadline: December    Mental health and wellness -  Plans to go to the [employer] and create a list of healthy options  Will work on portion control  Rice is a constant staple on the table  Aware she can control the portions  Dontiana Rowley to start putting more structure in her food intake  Will set alarms to help her remember  Is reading the manual and trying to get organized  Eating behaviors -  Last week was her Busy work week and has not made many changes  Dedicated to start making changes  Drinking more water and going to the bathroom upstairs more  Activity -  Plans to start exercise  Today parked far away and walked to the office  Going to look at joining gym  Progress toward program requirements    Workflow:  · Psych and/or D+A Clearance: N/A  · PCP Letter: pending  Has apt on June 8th  · Support Group: pending  · Surgeon Appt : 6/22/2022  · EGD : pending  · Cardiac Risk Assessment: pending  · Sleep Studies: 5/17/2022 - continue using the CPAP  Ordered a new one  · Bloodwork: pending       Goals: Will work on making changes  30/60 is very difficult

## 2022-05-25 ENCOUNTER — OFFICE VISIT (OUTPATIENT)
Dept: BARIATRICS | Facility: CLINIC | Age: 47
End: 2022-05-25

## 2022-05-25 VITALS — WEIGHT: 293 LBS | BODY MASS INDEX: 55.88 KG/M2

## 2022-05-25 DIAGNOSIS — E66.01 CLASS 3 SEVERE OBESITY WITHOUT SERIOUS COMORBIDITY WITH BODY MASS INDEX (BMI) OF 50.0 TO 59.9 IN ADULT, UNSPECIFIED OBESITY TYPE (HCC): Primary | ICD-10-CM

## 2022-05-25 PROCEDURE — RECHECK

## 2022-06-01 ENCOUNTER — OFFICE VISIT (OUTPATIENT)
Dept: BARIATRICS | Facility: CLINIC | Age: 47
End: 2022-06-01

## 2022-06-01 VITALS — BODY MASS INDEX: 55.32 KG/M2 | WEIGHT: 293 LBS | HEIGHT: 61 IN

## 2022-06-01 DIAGNOSIS — E66.01 CLASS 3 SEVERE OBESITY WITHOUT SERIOUS COMORBIDITY WITH BODY MASS INDEX (BMI) OF 50.0 TO 59.9 IN ADULT, UNSPECIFIED OBESITY TYPE (HCC): Primary | ICD-10-CM

## 2022-06-01 PROCEDURE — RECHECK: Performed by: DIETITIAN, REGISTERED

## 2022-06-01 NOTE — PROGRESS NOTES
Bariatric Nutrition Assessment Note    Type of surgery    Preop  Surgery Date: TBD- pt has 12 session program requirement     Today is 3/12 of program requirement     Surgeon: Dr Selam George  50 y o   female     Wt with BMI of 25: 132 4#  Pre-Op Excess Wt: 166 6 lbs  Ht 5' 1 2" (1 554 m)   Wt 134 kg (295 lb 3 2 oz)   BMI 55 41 kg/m²    Pt lost 2# since last appointment on 2022   Down a total of 3 7# since starting the program      Wt Readings from Last 3 Encounters:   22 135 kg (297 lb 11 2 oz)   22 135 kg (298 lb)   22 136 kg (299 lb)       [place]- [place] Equation:  1922 kcal per day   Estimated calories for weight loss 5376-9071 kcal per day  ( 1-2# per wk wt loss - sedentary )  Estimated protein needs 72-90 grams per day (1 2-1 5 gms/kg IBW )   Estimated fluid needs 70 ounces per day (35 ml/kg IBW )      Weight History   Onset of Obesity: Childhood- got worse after birth of her youngest daughter 12 years ago   Family history of obesity: Yes- 2 cousins had surgery   Wt Loss Attempts: eliminated soda, started decreasing portions, atkins diet, diabetic diet, exercise  prescribed Victoza to help decrease her weight   Prescription pills prescribed by doctor 20 years ago but stopped due to side effects     Patient has tried the above for 6 months or more with insufficient weight loss or weight regain, which is why patient has requested to be evaluated for weight loss surgery today  Maximum Wt Lost: 20 pounds with prescription pills   Highest weight was 315#      Review of History and Medications   Past Medical History:   Diagnosis Date    Hypertension     Pre-diabetes      Past Surgical History:   Procedure Laterality Date     SECTION      TUBAL LIGATION       Social History     Socioeconomic History    Marital status: Single     Spouse name: Not on file    Number of children: 3    Years of education: Not on file    Highest education level: Not on file   Occupational History    Occupation: Behavioral health field   Tobacco Use    Smoking status: Never Smoker    Smokeless tobacco: Never Used   Vaping Use    Vaping Use: Never used   Substance and Sexual Activity    Alcohol use: Never    Drug use: Never    Sexual activity: Not Currently     Birth control/protection: Female Sterilization   Other Topics Concern    Not on file   Social History Narrative    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     Social Determinants of Health     Financial Resource Strain: Low Risk     Difficulty of Paying Living Expenses: Not very hard   Food Insecurity: No Food Insecurity    Worried About Running Out of Food in the Last Year: Never true    Melany of Food in the Last Year: Never true   Transportation Needs: Not on file   Physical Activity: Not on file   Stress: Not on file   Social Connections: Not on file   Intimate Partner Violence: Not on file   Housing Stability: Not on file       Current Outpatient Medications:     cetirizine (ZyrTEC) 10 mg tablet, TAKE 1 TABLET (10 MG TOTAL) BY MOUTH DAILY AT BEDTIME AS NEEDED FOR ALLERGIES, Disp: 90 tablet, Rfl: 1    famotidine (PEPCID) 20 mg tablet, TAKE 1 TABLET BY MOUTH TWICE A DAY, Disp: 180 tablet, Rfl: 1    Insulin Pen Needle (Pen Needles) 31G X 5 MM MISC, Use in the morning, Disp: 100 each, Rfl: 2    lisinopril-hydrochlorothiazide (PRINZIDE,ZESTORETIC) 10-12 5 MG per tablet, TAKE 1 TABLET BY MOUTH EVERY DAY, Disp: 90 tablet, Rfl: 1    metFORMIN (GLUCOPHAGE) 500 mg tablet, Take 1 tablet (500 mg total) by mouth 2 (two) times a day with meals, Disp: 180 tablet, Rfl: 1    Victoza injection, INJECT 0 6 MG UNDER THE SKIN ONCE DAILY, Disp: 9 mL, Rfl: 1  Food Intake and Lifestyle Assessment   Food Intake Assessment completed via usual diet recall  Coffee   Breakfast: 8:30 to 9 am Flaco fine   Snack: 0   Lunch: 2 pm - sandwich or something fast so she can continue to work or macaroni and cheese   Sometimes skips lunch   Snack: 0  Dinner: 6 to 6:30 pm   Rice, meat, potatoes, sometimes lettuce or corn but not usually vegetables   Snack: will eat a sweet - such as cookie or ice cream cake   Beverage intake: water and stopped soda, drinks juice- diet   Protein supplement: no  Estimated protein intake per day: 40 grams   Estimated fluid intake per day: 48 ounces or more of water per day, and drinks juice with dinner 8-16 ounces   Meals eaten away from home: twice per week   Typical meal pattern: 2-3meals per day and 0-1 snacks per day  Eating Behaviors: Mindless eating, Emotional eating and Craves sweet foods  Food allergies or intolerances: No Known Allergies  Cultural or Christianity considerations: n/a     Physical Assessment    Lab Results   Component Value Date    HGBA1C 6 8 (A) 04/06/2022     Physical Activity  Types of exercise: None  Current physical limitations: foot pain, back pain, joint pain , SOB    Psychosocial Assessment   Support systems: daughter   Socioeconomic factors: works from home 40 hours per week   Lives with 12year old daughter and  Her older daughter and grandchild     Nutrition Diagnosis  Diagnosis: Overweight / Obesity (NC-3 3)  Related to: Food and nutrition-related knowledge deficit, Physical inactivity and Excessive energy intake  As Evidenced by: BMI >25, Excessive energy intake and Unintentional weight gain     Nutrition Prescription: Recommend the following diet  1500 kcal / 95 grams protein / 150 grams of carb/ 58 grams of fat, 15 grams of fiber   70 ounces of calorie free beverages     Interventions and Teaching   Discussed pre-op and post-op nutrition guidelines  Patient educated and handouts provided    Surgical changes to stomach / GI  Capacity of post-surgery stomach  Diet progression  Adequate hydration  Sugar and fat restriction to decrease "dumping syndrome"  Fat restriction to decrease steatorrhea  Expected weight loss  Weight loss plateaus/ possibility of weight regain  Exercise  Suggestions for pre-op diet  Nutrition considerations after surgery  Protein supplements  Meal planning and preparation  Appropriate carbohydrate, protein, and fat intake, and food/fluid choices to maximize safe weight loss, nutrient intake, and tolerance   Dietary and lifestyle changes  Possible problems with poor eating habits  Intuitive eating  Techniques for self monitoring and keeping daily food journal  Potential for food intolerance after surgery, and ways to deal with them including: lactose intolerance, nausea, reflux, vomiting, diarrhea, food intolerance, appetite changes, gas  Vitamin / Mineral supplementation of Multivitamin with minerals, Calcium, Vitamin B12, Iron, Fat Soluble vitamins and Vitamin D  Patient is not currently pregnant and doesn't desire to become pregnant a minimum of one year post-op- tubal ligation     Provided patient with 400 to 500 calorie meal options  Provided with 150-200 calories snacks  Provided with healthy shopping list  Provided with suggestions for "quick" grocery shopping and for "quick " meals   Reviewed importance of eating throughout the day to prevent overeating at night due to decreased circulating leptin       Education provided to: patient    Barriers to learning: No barriers identified    Readiness to change: preparation    Prior research on procedure: discussed with provider and pre-op class    Comprehension: needs reinforcement and verbalizes understanding     Expected Compliance: good    Workflow:   Psych and/or D+A Clearance: N/A   PCP Letter: 6/8/2022   Support Group: no   Surgeon Appt 6/22/2022    EGD needs surgeon appointment    Cardiac Risk Assessment 7/5/2022    Sleep Studies 5/17/2022    Blood work 3/19/2022    Nicotine test n/a    12 session  Pre-Operative Program: 3/12   Weight Loss 5% to submit to insurance - 284#, 10% day of surgery 269#      Recommendations  Pt is an appropriate candidate for surgery  Yes  Pt is aware that she needs to lose 10% of her body prior to surgery date  Evaluation / Monitoring  Dietitian to Monitor: Eating pattern as discussed Tolerance of nutrition prescription Body weight Lab values Physical activity Bowel pattern  Pt admits that she is having a hard time mentally making changes  She went to the store and felt overwhelmed as she did not know what to buy for her meals  Drinks coffee for breakfast , not because she likes coffee but because she is in a rush  Patient has been trying to reduce her portions at dinner by decreasing second or third helpings, but still eats off a regular size plate  Admits to eating mindlessly and not planning her meals  Just eats " when she is hungry"  Frequently skips breakfast and lunch and overeats at dinner and eat sweets in the evening  Last night she purposfully  did not eat after dinner, and realized that she was not actually hungry  She also noticed by limiting her portions at dinner, she did not experience heartburn  Made the realization that she was overeating at dinner which was causing heartburn/ reflux  Reviewed importance of eating throughout the day to prevent overeating at night  Pt has not been going to the gym but has been trying to move more by using the stairs  Reviewed importance of pre op vitamin and mineral intake and additional 2000 IU of vitamin D3  Pt verbalized understanding     Goals  Eliminate sugar sweetened beverages, Food journal, Complete lession plans 1-6, Eat 3 meals per day and Eliminate mindless snacking  Gym 1-2 x per week for 30 minutes ( plans to join with her daughter )   One womens multivitamin and mineral with iron   2000 IU of D3 per day  Include at least 15 grams of protein at breakfast  Eat 3 meals and one planned snack - space meals 4-5 hours apart   B - 300 calories   S 150-200 calories    L- 500 calories    D 500 calories   Total of 1500 calories per day      Continue to increase fluid intake to 64-80 ounces per day  Measure portions     Time Spent:   30 minutes

## 2022-06-01 NOTE — PATIENT INSTRUCTIONS
Goals  One womens multivitamin and mineral with iron   2000 IU of D3 per day  Include at least 15 grams of protein at breakfast  Eat 3 meals and one planned snack - space meals 4-5 hours apart  Continue to increase fluid intake to 64-80 ounces per day  Measure portions

## 2022-06-07 ENCOUNTER — OFFICE VISIT (OUTPATIENT)
Dept: BARIATRICS | Facility: CLINIC | Age: 47
End: 2022-06-07
Payer: COMMERCIAL

## 2022-06-07 VITALS
HEIGHT: 61 IN | WEIGHT: 293 LBS | BODY MASS INDEX: 55.32 KG/M2 | HEART RATE: 104 BPM | DIASTOLIC BLOOD PRESSURE: 80 MMHG | SYSTOLIC BLOOD PRESSURE: 116 MMHG | TEMPERATURE: 98.1 F

## 2022-06-07 DIAGNOSIS — I10 ESSENTIAL HYPERTENSION: ICD-10-CM

## 2022-06-07 DIAGNOSIS — E11.9 TYPE 2 DIABETES MELLITUS WITHOUT COMPLICATION, WITHOUT LONG-TERM CURRENT USE OF INSULIN (HCC): Primary | ICD-10-CM

## 2022-06-07 DIAGNOSIS — K21.9 CHRONIC GERD: ICD-10-CM

## 2022-06-07 DIAGNOSIS — G47.33 OSA (OBSTRUCTIVE SLEEP APNEA): ICD-10-CM

## 2022-06-07 DIAGNOSIS — E66.01 MORBID (SEVERE) OBESITY DUE TO EXCESS CALORIES (HCC): ICD-10-CM

## 2022-06-07 PROCEDURE — 3074F SYST BP LT 130 MM HG: CPT | Performed by: NURSE PRACTITIONER

## 2022-06-07 PROCEDURE — 3079F DIAST BP 80-89 MM HG: CPT | Performed by: NURSE PRACTITIONER

## 2022-06-07 PROCEDURE — 99213 OFFICE O/P EST LOW 20 MIN: CPT | Performed by: NURSE PRACTITIONER

## 2022-06-07 NOTE — PROGRESS NOTES
Assessment/Plan:    Diagnoses and all orders for this visit:    Type 2 diabetes mellitus without complication, without long-term current use of insulin (HCC)    ANGE (obstructive sleep apnea)    Essential hypertension    Chronic GERD    Morbid (severe) obesity due to excess calories (HCC)           Type 2 DM -  takes metformin BID and victoza  Advised patient to monitor her blood sugar at home  Review signs of hypoglycemia and hyperglycemia  GERD - takes pepcid BID PRN  Takes once daily  ANGE - follows with sleep medicine  On cpap QHS  Advised to brin with her on day of surgery  HTN - BP in office within limits today  On lisinopril-hydrochlorothiazide 10-12 5 mg PO QD  Advised to get BP cuff to monitor her BP at home  Interested in [address] with Dr Luisa Brunner  10% Weight loss-Not Completed  - goal on day of surgery is 269 LBS  Screening labs-Completed - need reorder of CBC and CMP at later time closer to surgery date  - labs good until sept  Support Group-Not Completed - will do tomorrow 06/08/2022  Cardiac Risk Assessment-Not completed - scheduled on 07/05/2022  Upper Endoscopy-Not Completed  Sleep Medicine Assessment- Not applicable - follows with sleep medicine and has a CPAP machine  Advised to bring machine with her on day of surgery  Alcohol and nicotine use is not recommended following bariatric surgery  NSAIDs should be avoided following bariatric surgery  Advised that pregnancy should be avoided following bariatric surgery for 12-18 months and should not solely rely on OCP and consider additional/alternative sources for contraception due to potential absorption issues-Completed    Follow up in approximately as scheduled  Goals:  Food log (ie ) [http://www  Oxigene,sparkpeople  com,loseit com,calorieking  com,etc]www  myfitnesspal com,sparkpeople  com,loseit com,calorieking  com,etc  baritastic  No sugary beverages  At least 64oz of water daily    Increase physical activity by 10 minutes daily  Gradually increase physical activity to a goal of 5 days per week for 30 minutes of MODERATE intensity PLUS 2 days per week of FULL BODY resistance training  Follow lessons 1-6 in the manual  Follow the 30/60 minute rule  Goal protein intake of 60-80 grams per day  Alcohol and nicotine use is not recommended following bariatric surgery  NSAIDs (Motrin, Advil, Aleve, Naproxen, ibuprofen, etc) should be avoided following bariatric surgery)    Subjective:   Chief Complaint   Patient presents with    Follow-up     4/12/visit -Andrew     Patient ID: Pastora Khan is a 50 y o  female with excess weight/obesity here to pursue weight management  Past Medical History:   Diagnosis Date    Hypertension     Pre-diabetes      HPI:  The patient has been:  Following the lessons in the manual- yes  Practicing the 30/60 minute rule- yes - has been practicing this  Having difficulty for 30 minutes prior to her meals  Food logging- no  Exercise- no - states she started walking however would like to join the gym eventually  Alcohol- no  Tobacco- no  NSAIDs- no  The following portions of the patient's history were reviewed and updated as appropriate: allergies, current medications, past family history, past medical history, past social history, past surgical history and problem list   Review of Systems   Constitutional: Positive for fatigue (WITH ACTIVITY)  Respiratory: Positive for shortness of breath (WITH ACTIVITY)  Cardiovascular: Negative  Gastrointestinal: Positive for diarrhea (AT BASELINE DUE TO METFORMIN)  REFLUX   Musculoskeletal: Negative  Neurological: Negative  Psychiatric/Behavioral: Negative  Objective:  /80   Pulse 104   Temp 98 1 °F (36 7 °C) (Tympanic)   Ht 5' 1 2" (1 554 m)   Wt 133 kg (294 lb)   BMI 55 19 kg/m²   Physical Exam  Vitals and nursing note reviewed  Constitutional:       Appearance: Normal appearance  She is obese  Cardiovascular:      Rate and Rhythm: Normal rate and regular rhythm  Pulses: Normal pulses  Heart sounds: Murmur (patient reports she had a work up for this but it was benign  Will have cardiology clearance ) heard  Pulmonary:      Effort: Pulmonary effort is normal       Breath sounds: Normal breath sounds  Abdominal:      General: Bowel sounds are normal       Palpations: Abdomen is soft  Musculoskeletal:         General: Normal range of motion  Skin:     General: Skin is warm and dry  Neurological:      General: No focal deficit present  Mental Status: She is alert and oriented to person, place, and time  Psychiatric:         Mood and Affect: Mood normal          Behavior: Behavior normal          Thought Content:  Thought content normal          Judgment: Judgment normal

## 2022-06-08 ENCOUNTER — OFFICE VISIT (OUTPATIENT)
Dept: FAMILY MEDICINE CLINIC | Facility: CLINIC | Age: 47
End: 2022-06-08

## 2022-06-08 VITALS
WEIGHT: 293 LBS | BODY MASS INDEX: 55.32 KG/M2 | SYSTOLIC BLOOD PRESSURE: 123 MMHG | TEMPERATURE: 97.7 F | RESPIRATION RATE: 18 BRPM | DIASTOLIC BLOOD PRESSURE: 84 MMHG | HEIGHT: 61 IN | OXYGEN SATURATION: 99 % | HEART RATE: 90 BPM

## 2022-06-08 DIAGNOSIS — E11.9 TYPE 2 DIABETES MELLITUS WITHOUT COMPLICATION, WITHOUT LONG-TERM CURRENT USE OF INSULIN (HCC): ICD-10-CM

## 2022-06-08 DIAGNOSIS — Z12.11 SCREENING FOR COLON CANCER: ICD-10-CM

## 2022-06-08 DIAGNOSIS — R79.89 ABNORMAL TSH: ICD-10-CM

## 2022-06-08 DIAGNOSIS — J30.9 ALLERGIC RHINITIS, UNSPECIFIED SEASONALITY, UNSPECIFIED TRIGGER: ICD-10-CM

## 2022-06-08 DIAGNOSIS — E55.9 VITAMIN D DEFICIENCY: ICD-10-CM

## 2022-06-08 DIAGNOSIS — E66.01 CLASS 3 SEVERE OBESITY DUE TO EXCESS CALORIES WITHOUT SERIOUS COMORBIDITY WITH BODY MASS INDEX (BMI) OF 50.0 TO 59.9 IN ADULT (HCC): Primary | ICD-10-CM

## 2022-06-08 DIAGNOSIS — Z00.00 HEALTHCARE MAINTENANCE: ICD-10-CM

## 2022-06-08 PROCEDURE — 99213 OFFICE O/P EST LOW 20 MIN: CPT | Performed by: FAMILY MEDICINE

## 2022-06-08 PROCEDURE — 3079F DIAST BP 80-89 MM HG: CPT | Performed by: FAMILY MEDICINE

## 2022-06-08 PROCEDURE — 3074F SYST BP LT 130 MM HG: CPT | Performed by: FAMILY MEDICINE

## 2022-06-08 PROCEDURE — 1036F TOBACCO NON-USER: CPT | Performed by: FAMILY MEDICINE

## 2022-06-08 RX ORDER — FLUTICASONE PROPIONATE 50 MCG
1 SPRAY, SUSPENSION (ML) NASAL DAILY
Qty: 11.1 ML | Refills: 3 | Status: SHIPPED | OUTPATIENT
Start: 2022-06-08 | End: 2022-07-05

## 2022-06-08 RX ORDER — ERGOCALCIFEROL 1.25 MG/1
50000 CAPSULE ORAL WEEKLY
Qty: 4 CAPSULE | Refills: 2 | Status: CANCELLED | OUTPATIENT
Start: 2022-06-08 | End: 2022-08-31

## 2022-06-08 RX ORDER — CETIRIZINE HYDROCHLORIDE 10 MG/1
10 TABLET ORAL DAILY PRN
Qty: 90 TABLET | Refills: 1 | Status: SHIPPED | OUTPATIENT
Start: 2022-06-08

## 2022-06-08 NOTE — PROGRESS NOTES
Assessment/Plan:    Abnormal TSH  Reviewed labs from 5/17/22  - TSH is low, 0 165; Free T4 is wnl, 1 1  - pt endorses weight loss, however, likely due to lifestyle changes in preporation for bariatric surgery  - Upon questioning, pt admits to sometimes having palpitations, however, history and nature of symptom is unclear  Pt endorses fast heart rate with a/w exercise, anxiety, and rest      Plan  - recheck TSH  Allergic rhinitis  Patient requests allergy medication  - ordered zyrtec and flonase    Type 2 diabetes mellitus without complication, without long-term current use of insulin ([facility])    Lab Results   Component Value Date    HGBA1C 6 8 (A) 04/06/2022     - ordered glucometer and glucometer strips  - recommended pt check fasting sugars and sugars with meals  - f/u for diabetes in 1 month  - recheck HA1c at f/u      Class 3 severe obesity without serious comorbidity with body mass index (BMI) of 50 0 to 59 9 in adult ([facility])  BMI 55 66 kg/m^2 today    Patient initiated process for bariatric surgery and is compliant with recommendations  Pt is motivated and already lost 10lbs  - gave pt letter expressing that as pt's PCP, I agree that bariatric surgery would benefit pt especially given pt's comorbities    Vitamin D deficiency  2/18/2019 Vit 13 8  - will recheck Vit D    Healthcare maintenance  - Referred to GI for initial screening colonoscopy since pt is >43 yo  - f/u in 1 month for cervical screening        Diagnoses and all orders for this visit:    Class 3 severe obesity due to excess calories without serious comorbidity with body mass index (BMI) of 50 0 to 59 9 in adult [facility])    Allergic rhinitis, unspecified seasonality, unspecified trigger  -     fluticasone (FLONASE) 50 mcg/act nasal spray; 1 spray into each nostril daily  -     cetirizine (ZyrTEC) 10 mg tablet;  Take 1 tablet (10 mg total) by mouth daily as needed for allergies or rhinitis    Screening for colon cancer  -     Ambulatory Referral to Gastroenterology; Future    Type 2 diabetes mellitus without complication, without long-term current use of insulin ([facility])  -     Glucometer  -     Glucometer test strips    Vitamin D deficiency  -     Vitamin D 25 hydroxy; Future    Abnormal TSH  -     TSH, 3rd generation with Free T4 reflex; Future    Healthcare maintenance        Subjective:      Patient ID: Ether Alpers is a 50 y o  female  Patient is here to receive letter from PCP for approval of bariatric surgery  Pt is following up with bariatric surgery appts including dietary and phychiatric  She has already lost about 10 lbs since adhering to recommended diet  Pt is motivated to comply with recommendations to have bariatric surgery, tentatively planned for December 2022  Pt also is here to follow up with abnormal TSH and Vit D level  Pt c/o of nasal congestion and difficulty breathing through nose due to allergies  Flonase worked well for her in the past but pt stopped taking  She requests it today  The following portions of the patient's history were reviewed and updated as appropriate: current medications  Review of Systems   Constitutional: Negative for activity change, fatigue and fever  Respiratory: Negative for cough  Cardiovascular: Negative for chest pain  Objective:      /84 (BP Location: Left arm, Patient Position: Sitting, Cuff Size: Large)   Pulse 90   Temp 97 7 °F (36 5 °C) (Temporal)   Resp 18   Ht 5' 1" (1 549 m)   Wt 134 kg (294 lb 9 6 oz)   SpO2 99%   BMI 55 66 kg/m²          Physical Exam  Constitutional:       Appearance: She is obese  HENT:      Head: Normocephalic and atraumatic  Right Ear: External ear normal       Left Ear: External ear normal       Mouth/Throat:      Mouth: Mucous membranes are dry  Pharynx: Oropharynx is clear  Eyes:      Extraocular Movements: Extraocular movements intact        Conjunctiva/sclera: Conjunctivae normal    Cardiovascular:      Rate and Rhythm: Normal rate and regular rhythm  Pulmonary:      Breath sounds: Normal breath sounds  Musculoskeletal:      Right lower leg: No edema (no pitting edema)  Left lower leg: No edema (no pitting edema)  Skin:     General: Skin is warm and dry  Neurological:      General: No focal deficit present  Mental Status: She is alert  Psychiatric:         Mood and Affect: Mood normal          Behavior: Behavior normal          Thought Content:  Thought content normal          Judgment: Judgment normal

## 2022-06-08 NOTE — ASSESSMENT & PLAN NOTE
Reviewed labs from 5/17/22  - TSH is low, 0 165; Free T4 is wnl, 1 1  - pt endorses weight loss, however, likely due to lifestyle changes in preporation for bariatric surgery  - Upon questioning, pt admits to sometimes having palpitations, however, history and nature of symptom is unclear  Pt endorses fast heart rate with a/w exercise, anxiety, and rest      Plan  - recheck TSH

## 2022-06-14 PROBLEM — Z00.00 HEALTHCARE MAINTENANCE: Status: ACTIVE | Noted: 2022-06-14

## 2022-06-14 NOTE — ASSESSMENT & PLAN NOTE
- Referred to GI for initial screening colonoscopy since pt is >41 yo  - f/u in 1 month for cervical screening

## 2022-06-14 NOTE — ASSESSMENT & PLAN NOTE
BMI 55 66 kg/m^2 today    Patient initiated process for bariatric surgery and is compliant with recommendations  Pt is motivated and already lost 10lbs       - gave pt letter expressing that as pt's PCP, I agree that bariatric surgery would benefit pt especially given pt's comorbities

## 2022-06-14 NOTE — ASSESSMENT & PLAN NOTE
Lab Results   Component Value Date    HGBA1C 6 8 (A) 04/06/2022     - ordered glucometer and glucometer strips  - recommended pt check fasting sugars and sugars with meals  - f/u for diabetes in 1 month  - recheck HA1c at f/u

## 2022-06-15 NOTE — PROGRESS NOTES
Behavioral Health Follow Up Note:       5 / 12  Weight Check:  Dr Giuliana Thao     Starting weight 299  #  Today's weight  293 4 #  Surgery goal   269  #     Surgery month:  Aug/Sept  Surgery deadline: December     Mental health and wellness -  trying to stay motivated by making small changes at one time and then adding on  Being more accountable with her food choices  The behavior of drinking can improve  Tried to join one support group and was not successful    Reviewed the process to join virtually    Family is supportive  Is reading labels  Following social media and causes some anxiety  Feels she 'needs" to lose weight and bariatric is a great option     Eating behaviors -   Drinking more water and less juice  Watching her portion sizes  Working on 30/60  Reducing her rice intake  Adding more vegetables  (lettuce)      Activity -  Still Planning  to start exercise  purchased some arm weights  Exercising with younger daughter        Progress toward program requirements     Workflow:  · Psych and/or D+A Clearance: N/A  · PCP Letter: handed SW the letter  · Support Group: pending   Provided her with the Live support group option   · Surgeon Appt : 6/22/2022  · EGD : pending  · Cardiac Risk Assessment: 7/5/2022  · Sleep Studies: 5/17/2022 - continue using the CPAP  Ordered a new one  · Bloodwork: pending        Goals:  1  Focus on reducing portion sizes  2   Choices healthier options  3   Find a source of liquid protein that she likes

## 2022-06-16 ENCOUNTER — OFFICE VISIT (OUTPATIENT)
Dept: BARIATRICS | Facility: CLINIC | Age: 47
End: 2022-06-16

## 2022-06-16 VITALS — BODY MASS INDEX: 55.44 KG/M2 | WEIGHT: 293 LBS

## 2022-06-16 DIAGNOSIS — E66.9 OBESITY, CLASS I, BMI 30-34.9: Primary | ICD-10-CM

## 2022-06-16 PROCEDURE — RECHECK

## 2022-06-22 ENCOUNTER — CONSULT (OUTPATIENT)
Dept: BARIATRICS | Facility: CLINIC | Age: 47
End: 2022-06-22
Payer: COMMERCIAL

## 2022-06-22 VITALS
DIASTOLIC BLOOD PRESSURE: 70 MMHG | HEIGHT: 61 IN | HEART RATE: 102 BPM | BODY MASS INDEX: 55.32 KG/M2 | SYSTOLIC BLOOD PRESSURE: 133 MMHG | WEIGHT: 293 LBS | TEMPERATURE: 97.2 F | RESPIRATION RATE: 16 BRPM

## 2022-06-22 DIAGNOSIS — E66.01 CLASS 3 SEVERE OBESITY DUE TO EXCESS CALORIES WITHOUT SERIOUS COMORBIDITY WITH BODY MASS INDEX (BMI) OF 50.0 TO 59.9 IN ADULT (HCC): Primary | ICD-10-CM

## 2022-06-22 DIAGNOSIS — E11.9 TYPE 2 DIABETES MELLITUS WITHOUT COMPLICATION, WITHOUT LONG-TERM CURRENT USE OF INSULIN (HCC): ICD-10-CM

## 2022-06-22 DIAGNOSIS — M79.89 LEG SWELLING: ICD-10-CM

## 2022-06-22 DIAGNOSIS — G47.33 OSA (OBSTRUCTIVE SLEEP APNEA): ICD-10-CM

## 2022-06-22 DIAGNOSIS — I10 ESSENTIAL HYPERTENSION: ICD-10-CM

## 2022-06-22 DIAGNOSIS — K21.9 CHRONIC GERD: ICD-10-CM

## 2022-06-22 DIAGNOSIS — R53.82 CHRONIC FATIGUE: ICD-10-CM

## 2022-06-22 PROCEDURE — 99204 OFFICE O/P NEW MOD 45 MIN: CPT | Performed by: SURGERY

## 2022-06-22 PROCEDURE — 1036F TOBACCO NON-USER: CPT | Performed by: SURGERY

## 2022-06-22 RX ORDER — ANTIARTHRITIC COMBINATION NO.2 900 MG
TABLET ORAL
COMMUNITY

## 2022-06-22 RX ORDER — LIRAGLUTIDE 6 MG/ML
INJECTION SUBCUTANEOUS
Qty: 3 ML | Refills: 2 | Status: SHIPPED | OUTPATIENT
Start: 2022-06-22

## 2022-06-22 NOTE — PROGRESS NOTES
BARIATRIC INITIAL CONSULT - BARIATRIC SURGERY    Duane Lopes 50 y o  female MRN: [id number]  Unit/Bed#:  Encounter: [id number]      HPI:  Duane Lopes is a 50 y o  female who presents with a longstanding history of morbid obesity and inability to sustain a meaningful weight loss  Here today to discuss bariatric options  Visit type: initial visit    Symptoms: excess weight and inability to loss weight    Associated Symptoms: depressed mood and anxiety    Associated Conditions: sleep apnea, abdominal obesity and hypergycemia  Disease Complications: diabetes, hypertension, sleep apnea and Leg swelling  Weight Loss Interest: high  Previous Diet Trials: low calorie     Exercise Frequency:infrequency  Types of Exercise: walking        Review of Systems   Gastrointestinal:        Heartburn on PPI   All other systems reviewed and are negative  Historical Information   Past Medical History:   Diagnosis Date    Hypertension     Pre-diabetes      Past Surgical History:   Procedure Laterality Date     SECTION      TUBAL LIGATION       Social History   Social History     Substance and Sexual Activity   Alcohol Use Never     Social History     Substance and Sexual Activity   Drug Use Never     Social History     Tobacco Use   Smoking Status Never Smoker   Smokeless Tobacco Never Used     Family History: non-contributory    Meds/Allergies   all medications and allergies reviewed  No Known Allergies    Objective       Current Vitals:   Blood Pressure: 133/70 (22 1415)  Pulse: 102 (22 1415)  Temperature: (!) 97 2 °F (36 2 °C) (22 1415)  Temp Source: Tympanic (22 1415)  Respirations: 16 (22 1415)  Height: 5' 1 2" (155 4 cm) (22 1415)  Weight - Scale: 134 kg (295 lb) (22 141)        Invasive Devices  Report    None                 Physical Exam  Vitals reviewed  Constitutional:       General: She is not in acute distress  Appearance: She is well-developed  She is not diaphoretic  HENT:      Head: Normocephalic and atraumatic  Right Ear: External ear normal       Left Ear: External ear normal       Nose: Nose normal    Eyes:      General: No scleral icterus  Right eye: No discharge  Left eye: No discharge  Conjunctiva/sclera: Conjunctivae normal    Neurological:      Mental Status: She is alert and oriented to person, place, and time  Psychiatric:         Behavior: Behavior normal          Thought Content: Thought content normal          Judgment: Judgment normal          Lab Results: I have personally reviewed pertinent lab results  Imaging: I have personally reviewed pertinent reports  EKG, Pathology, and Other Studies: I have personally reviewed pertinent reports  Assessment/PLAN:    50 y o  female with a long standing h/o of obesity and inability to sustain any meaningful weight loss on her own despite several attempts  She is interested in the Laparoscopic Gastric bypass possible sleeve gastrectomy  As a part of her pre op evaluation, she will be referred to a cardiologist and for a sleep evaluation and consult  She needs an EGD to evaluate the anatomy of her GI tract prior to the operation  I have spent over 30 minutes with her face to face in the office today discussing her options and details of the surgery  We have seen an animation of the surgery on the computer that illustrates how the operation is done and how the anatomy will be altered with the procedure  Over 50% of this was coordinating care  I have used the [facility] bariatric surgical risk/benefit calculator and we have discussed the results as part of the preop education  She was given the opportunity to ask questions and I have answered all of them  I have discussed and educated the patient with regards to the components of our multidisciplinary program and the importance of compliance and follow up in the post operative period   The patient was also instructed with regards to the importance of behavior modification, nutritional counseling, support meeting attendance and lifestyle changes that are important to ensure success  Although there is a great statistical chance of improvement or even resolution of most of her associated comorbidities, the results vary from patient to patient and they largely depend on her commitment and compliance  She needs to lose 30 lbs prior to the operation  She does have significant heartburn and we will evaluate the anatomy of her esophagus        Susi Win MD  6/22/2022  2:39 PM

## 2022-06-23 DIAGNOSIS — I10 ESSENTIAL HYPERTENSION: ICD-10-CM

## 2022-06-23 RX ORDER — LISINOPRIL AND HYDROCHLOROTHIAZIDE 12.5; 1 MG/1; MG/1
TABLET ORAL
Qty: 90 TABLET | Refills: 1 | Status: SHIPPED | OUTPATIENT
Start: 2022-06-23

## 2022-06-28 NOTE — PROGRESS NOTES
Bariatric Nutrition Assessment Note    Type of surgery    Preop  Surgery Date: TBD- pt has 12 session program requirement     Today is  of program requirement     Surgeon: Dr Nallely Bhakta  50 y o   female     Wt with BMI of 25: 132 4#  Pre-Op Excess Wt: 166 6 lbs  Ht 5' 1 2" (1 554 m)   Wt 133 kg (292 lb 11 2 oz)   BMI 54 94 kg/m²      Pt lost 2 7# since last appointment on 2022   Down a total of 6 4# since starting the program  ( 2% of her body weight )     Wt Readings from Last 3 Encounters:   22 134 kg (295 lb)   22 133 kg (293 lb 6 4 oz)   22 134 kg (294 lb 9 6 oz)       [employer]- [place] Equation:  1922 kcal per day   Estimated calories for weight loss 1165-6794 kcal per day  ( 1-2# per wk wt loss - sedentary )  Estimated protein needs 72-90 grams per day (1 2-1 5 gms/kg IBW )   Estimated fluid needs 70 ounces per day (35 ml/kg IBW )      Weight History   Onset of Obesity: Childhood- got worse after birth of her youngest daughter 12 years ago   Family history of obesity: Yes- 2 cousins had surgery   Wt Loss Attempts: eliminated soda, started decreasing portions, atkins diet, diabetic diet, exercise  prescribed Victoza to help decrease her weight   Prescription pills prescribed by doctor 20 years ago but stopped due to side effects     Patient has tried the above for 6 months or more with insufficient weight loss or weight regain, which is why patient has requested to be evaluated for weight loss surgery today  Maximum Wt Lost: 20 pounds with prescription pills   Highest weight was 315#      Review of History and Medications   Past Medical History:   Diagnosis Date    Hypertension     Pre-diabetes      Past Surgical History:   Procedure Laterality Date     SECTION      TUBAL LIGATION       Social History     Socioeconomic History    Marital status: Single     Spouse name: Not on file    Number of children: 3    Years of education: Not on file    Highest education level: Not on file   Occupational History    Occupation: Behavioral health field   Tobacco Use    Smoking status: Never Smoker    Smokeless tobacco: Never Used   Vaping Use    Vaping Use: Never used   Substance and Sexual Activity    Alcohol use: Never    Drug use: Never    Sexual activity: Not Currently     Birth control/protection: Female Sterilization   Other Topics Concern    Not on file   Social History Narrative    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     Social Determinants of Health     Financial Resource Strain: Low Risk     Difficulty of Paying Living Expenses: Not very hard   Food Insecurity: No Food Insecurity    Worried About Running Out of Food in the Last Year: Never true    Melany of Food in the Last Year: Never true   Transportation Needs: No Transportation Needs    Lack of Transportation (Medical): No    Lack of Transportation (Non-Medical):  No   Physical Activity: Not on file   Stress: Not on file   Social Connections: Not on file   Intimate Partner Violence: Not on file   Housing Stability: Low Risk     Unable to Pay for Housing in the Last Year: No    Number of Places Lived in the Last Year: 1    Unstable Housing in the Last Year: No       Current Outpatient Medications:     lisinopril-hydrochlorothiazide (PRINZIDE,ZESTORETIC) 10-12 5 MG per tablet, TAKE 1 TABLET BY MOUTH EVERY DAY, Disp: 90 tablet, Rfl: 1    cetirizine (ZyrTEC) 10 mg tablet, Take 1 tablet (10 mg total) by mouth daily as needed for allergies or rhinitis, Disp: 90 tablet, Rfl: 1    famotidine (PEPCID) 20 mg tablet, TAKE 1 TABLET BY MOUTH TWICE A DAY, Disp: 180 tablet, Rfl: 1    fluticasone (FLONASE) 50 mcg/act nasal spray, 1 spray into each nostril daily, Disp: 11 1 mL, Rfl: 3    Insulin Pen Needle (Pen Needles) 31G X 5 MM MISC, Use in the morning, Disp: 100 each, Rfl: 2    metFORMIN (GLUCOPHAGE) 500 mg tablet, Take 1 tablet (500 mg total) by mouth 2 (two) times a day with meals, Disp: 180 tablet, Rfl: 1    Multiple Vitamins-Minerals (Womens Multi) CAPS, Take by mouth, Disp: , Rfl:     Victoza injection, INJECT 0 6 MG UNDER THE SKIN ONCE DAILY, Disp: 3 mL, Rfl: 2  Food Intake and Lifestyle Assessment   Food Intake Assessment completed via usual diet recall  Coffee   Breakfast: 8:30 to 9 am Flaco fine   Snack: 0   Lunch: 2 pm - sandwich or something fast so she can continue to work or macaroni and cheese   Sometimes skips lunch   Snack: 0  Dinner: 6 to 6:30 pm   Rice, meat, potatoes, sometimes lettuce or corn but not usually vegetables   Snack: will eat a sweet - such as cookie or ice cream cake   Beverage intake: water and stopped soda, drinks juice- diet   Protein supplement: no  Estimated protein intake per day: 40 grams   Estimated fluid intake per day: 48 ounces or more of water per day, and drinks juice with dinner 8-16 ounces   Meals eaten away from home: twice per week   Typical meal pattern: 2-3meals per day and 0-1 snacks per day  Eating Behaviors: Mindless eating, Emotional eating and Craves sweet foods  Food allergies or intolerances: No Known Allergies  Cultural or Quaker considerations: n/a     Physical Assessment    Lab Results   Component Value Date    [facility]1C 6 8 (A) 04/06/2022     Physical Activity  Types of exercise: None  Current physical limitations: foot pain, back pain, joint pain , SOB    Psychosocial Assessment   Support systems: daughter   Socioeconomic factors: works from home 40 hours per week   Lives with 12year old daughter and  Her older daughter and grandchild     Nutrition Diagnosis- continued   Diagnosis: Overweight / Obesity (NC-3 3)  Related to: Food and nutrition-related knowledge deficit, Physical inactivity and Excessive energy intake  As Evidenced by: BMI >25, Excessive energy intake and Unintentional weight gain     Nutrition Prescription: Recommend the following diet  1500 kcal / 95 grams protein / 150 grams of carb/ 58 grams of fat, 15 grams of fiber   70 ounces of calorie free beverages     Interventions and Teaching   Discussed pre-op and post-op nutrition guidelines  Patient educated and handouts provided  Surgical changes to stomach / GI  Capacity of post-surgery stomach  Diet progression  Adequate hydration  Sugar and fat restriction to decrease "dumping syndrome"  Fat restriction to decrease steatorrhea  Expected weight loss  Weight loss plateaus/ possibility of weight regain  Exercise  Suggestions for pre-op diet  Nutrition considerations after surgery  Protein supplements  Meal planning and preparation  Appropriate carbohydrate, protein, and fat intake, and food/fluid choices to maximize safe weight loss, nutrient intake, and tolerance   Dietary and lifestyle changes  Possible problems with poor eating habits  Intuitive eating  Techniques for self monitoring and keeping daily food journal  Potential for food intolerance after surgery, and ways to deal with them including: lactose intolerance, nausea, reflux, vomiting, diarrhea, food intolerance, appetite changes, gas  Vitamin / Mineral supplementation of Multivitamin with minerals, Calcium, Vitamin B12, Iron, Fat Soluble vitamins and Vitamin D  Patient is not currently pregnant and doesn't desire to become pregnant a minimum of one year post-op- tubal ligation     Provided patient with 400 to 500 calorie meal options  Provided with 150-200 calories snacks  Provided with healthy shopping list  Provided with suggestions for "quick" grocery shopping and for "quick " meals   Reviewed importance of eating throughout the day to prevent overeating at night due to decreased circulating leptin       Education provided to: patient    Barriers to learning: No barriers identified    Readiness to change: preparation    Prior research on procedure: discussed with provider and pre-op class    Comprehension: needs reinforcement and verbalizes understanding Expected Compliance: good    Workflow:   Psych and/or D+A Clearance: N/A   PCP Letter: 6/8/2022   Support Group: no   Surgeon Appt 6/22/2022    EGD 9/21/2022   Cardiac Risk Assessment 7/5/2022    Sleep Studies 5/17/2022    Blood work 3/19/2022    Nicotine test n/a    12 session  Pre-Operative Program: 6/12   Weight Loss 5% to submit to insurance - 284#,      Recommendations  Pt is an appropriate candidate for surgery  Yes  Pt is aware that she needs to lose 10% of her body prior to surgery date  Evaluation / Monitoring  Dietitian to Monitor: Eating pattern as discussed Tolerance of nutrition prescription Body weight Lab values Physical activity Bowel pattern    Pt found the healthy shopping list provided helpful and was making better choices  Unfortunately, things have been very busy at work this week and she admits to getting off track   She continues to drink coffee with crackers for breakfast   She tried the Ensure Max and found it too thick  Suggested mixing with coffee or using a protein powder and then she can add extra fluid to decrease thickness  Trying to be more consistent with meal times  She has been eating fruit for evening snack instead of other sweets such as ice cream, cookies or cake  Pt has not been going to the gym  Decided that buying a treadmill would make it easier to incorporate exercise into her daily routine  She is taking an adult multivitamin and mineral, but is waiting for the results of her PCP blood work before taking her vitamin D, as she may need a higher prescription dose  Continues to struggle with increasing her fluid intake        Goals  Eliminate sugar sweetened beverages, Food journal, Complete lession plans 1-6, Eat 3 meals per day and Eliminate mindless snacking  Get blood work done and take vitamin D as recommended based on results  Treadmill twice per week 10-15 minutes  Purchase large water bottle - goal is 64-70 ounces per day of calorie free beverages  Practice 15/30 rule- stop drinking 15 minutes before, sips with meals and wait 30 minutes after   Include at least 15 grams of protein at breakfast - try mixing protein with coffee   Provided with bariatric pal web site information and coupon code  Site sells sample packets of protein to try before  purchasing a large protein tub  Pt plans to purchase sample packs and will hopefully find one that she likes  Eat 3 meals and one planned snack - space meals 4-5 hours apart   B - 300 calories   S 150-200 calories    L- 500 calories    D 500 calories   Total of 1500 calories per day          Time Spent:   30 minutes

## 2022-06-29 ENCOUNTER — OFFICE VISIT (OUTPATIENT)
Dept: BARIATRICS | Facility: CLINIC | Age: 47
End: 2022-06-29

## 2022-06-29 VITALS — WEIGHT: 292.7 LBS | HEIGHT: 61 IN | BODY MASS INDEX: 55.26 KG/M2

## 2022-06-29 DIAGNOSIS — E66.01 CLASS 3 SEVERE OBESITY DUE TO EXCESS CALORIES WITHOUT SERIOUS COMORBIDITY WITH BODY MASS INDEX (BMI) OF 50.0 TO 59.9 IN ADULT (HCC): Primary | ICD-10-CM

## 2022-06-29 PROCEDURE — 3008F BODY MASS INDEX DOCD: CPT | Performed by: FAMILY MEDICINE

## 2022-06-29 PROCEDURE — RECHECK: Performed by: DIETITIAN, REGISTERED

## 2022-06-29 PROCEDURE — 3008F BODY MASS INDEX DOCD: CPT | Performed by: SURGERY

## 2022-06-29 NOTE — PATIENT INSTRUCTIONS
Goals  Get blood work done and take vitamin D as recommended based on results  Try protein shake with coffee in morning   Treadmill twice per week 10-15 minutes  Purchase large water bottle - goal is 64-70 ounces per day of calorie free beverages  Practice 15/30 rule- stop drinking 15 minutes before, sips with meals and wait 30 minutes after

## 2022-07-02 ENCOUNTER — APPOINTMENT (OUTPATIENT)
Dept: LAB | Facility: HOSPITAL | Age: 47
End: 2022-07-02
Payer: COMMERCIAL

## 2022-07-02 DIAGNOSIS — R79.89 ABNORMAL TSH: ICD-10-CM

## 2022-07-02 DIAGNOSIS — E55.9 VITAMIN D DEFICIENCY: ICD-10-CM

## 2022-07-02 LAB
25(OH)D3 SERPL-MCNC: 21.9 NG/ML (ref 30–100)
T4 FREE SERPL-MCNC: 1.22 NG/DL (ref 0.76–1.46)
TSH SERPL DL<=0.05 MIU/L-ACNC: 0.03 UIU/ML (ref 0.45–4.5)

## 2022-07-02 PROCEDURE — 36415 COLL VENOUS BLD VENIPUNCTURE: CPT

## 2022-07-02 PROCEDURE — 84443 ASSAY THYROID STIM HORMONE: CPT

## 2022-07-02 PROCEDURE — 84439 ASSAY OF FREE THYROXINE: CPT

## 2022-07-02 PROCEDURE — 82306 VITAMIN D 25 HYDROXY: CPT

## 2022-07-03 DIAGNOSIS — J30.9 ALLERGIC RHINITIS, UNSPECIFIED SEASONALITY, UNSPECIFIED TRIGGER: ICD-10-CM

## 2022-07-05 ENCOUNTER — CONSULT (OUTPATIENT)
Dept: CARDIOLOGY CLINIC | Facility: CLINIC | Age: 47
End: 2022-07-05
Payer: COMMERCIAL

## 2022-07-05 VITALS
DIASTOLIC BLOOD PRESSURE: 80 MMHG | WEIGHT: 293 LBS | BODY MASS INDEX: 53.92 KG/M2 | OXYGEN SATURATION: 99 % | SYSTOLIC BLOOD PRESSURE: 128 MMHG | HEART RATE: 85 BPM | HEIGHT: 62 IN

## 2022-07-05 DIAGNOSIS — R06.02 SOB (SHORTNESS OF BREATH): Primary | ICD-10-CM

## 2022-07-05 DIAGNOSIS — E66.01 MORBID OBESITY (HCC): ICD-10-CM

## 2022-07-05 DIAGNOSIS — Z01.818 PRE-OP EXAM: ICD-10-CM

## 2022-07-05 PROCEDURE — 93000 ELECTROCARDIOGRAM COMPLETE: CPT | Performed by: INTERNAL MEDICINE

## 2022-07-05 PROCEDURE — 99203 OFFICE O/P NEW LOW 30 MIN: CPT | Performed by: INTERNAL MEDICINE

## 2022-07-05 RX ORDER — FLUTICASONE PROPIONATE 50 MCG
SPRAY, SUSPENSION (ML) NASAL
Qty: 48 ML | Refills: 2 | Status: SHIPPED | OUTPATIENT
Start: 2022-07-05

## 2022-07-05 NOTE — PROGRESS NOTES
Outpatient Consultation - General Cardiology   Gui Felton 50 y o  female   MRN: [id number]  Encounter: [id number]      PCP: Donnelly Cooks, DO    History of Present Illness   Physician Requesting Consult: Consults   Reason for Consult / Principal Problem: Preop Bariatric    HPI: Gui Felton is a 50y o  year old female  With history of DM2, ANGE, HTN and obesity who presents for evaluation prior to bariatric surgery  Patient states she has been having trouble losing weight even with dietary modifications  States that she is sedentary because with exertion she feels exhausted and can get dyspneic  Walking from the parking lot to our office made her feel SOB which alludes to deconditioning  She states that she has 15 steps at home and get SOB while at the top of them  She works from home and notices that at the end of the day her legs are swollen  Otherwise she denies history of CP, palpitations, lightheadedness, dizziness, PND, orthopnea or any other associated complaints  Denies EtOH, tobacco or drug use  History of HTN and DM2 in both her mother and father  No MI or CVA as far as she knows        Review of Systems  Review of system was conducted and was negative except for as stated in the HPI        Historical Information   Past Medical History:   Diagnosis Date    Hypertension     Pre-diabetes      Past Surgical History:   Procedure Laterality Date     SECTION      TUBAL LIGATION       Social History     Substance and Sexual Activity   Alcohol Use Never     Social History     Substance and Sexual Activity   Drug Use Never     Social History     Tobacco Use   Smoking Status Never Smoker   Smokeless Tobacco Never Used     Family History:   Family History   Problem Relation Age of Onset    Hypertension Mother     Diabetes Mother     Hypertension Father     Diabetes Father     Cancer Maternal Grandmother     Diabetes Maternal Grandfather        Meds/Allergies   Home Medications:   Current Outpatient Medications:     lisinopril-hydrochlorothiazide (PRINZIDE,ZESTORETIC) 10-12 5 MG per tablet, TAKE 1 TABLET BY MOUTH EVERY DAY, Disp: 90 tablet, Rfl: 1    cetirizine (ZyrTEC) 10 mg tablet, Take 1 tablet (10 mg total) by mouth daily as needed for allergies or rhinitis, Disp: 90 tablet, Rfl: 1    famotidine (PEPCID) 20 mg tablet, TAKE 1 TABLET BY MOUTH TWICE A DAY, Disp: 180 tablet, Rfl: 1    fluticasone (FLONASE) 50 mcg/act nasal spray, 1 spray into each nostril daily, Disp: 11 1 mL, Rfl: 3    Insulin Pen Needle (Pen Needles) 31G X 5 MM MISC, Use in the morning, Disp: 100 each, Rfl: 2    metFORMIN (GLUCOPHAGE) 500 mg tablet, Take 1 tablet (500 mg total) by mouth 2 (two) times a day with meals, Disp: 180 tablet, Rfl: 1    Multiple Vitamins-Minerals (Womens Multi) CAPS, Take by mouth, Disp: , Rfl:     Victoza injection, INJECT 0 6 MG UNDER THE SKIN ONCE DAILY, Disp: 3 mL, Rfl: 2    No Known Allergies      Objective   Vitals: not currently breastfeeding  Physical Exam  GEN: Georges Wheatley appears well, alert and oriented x 3, pleasant and cooperative, obese  HEENT:  Normocephalic, atraumatic, anicteric, moist mucous membranes  NECK: No JVD or carotid bruits   HEART: reg rhythm, reg rate, normal S1 and S2, no murmurs, clicks, gallops or rubs   LUNGS: Clear to auscultation bilaterally; no wheezes, rales, or rhonchi; respiration nonlabored   ABDOMEN:  Normoactive bowel sounds, soft, no tenderness, no distention  EXTREMITIES: peripheral pulses palpable; no edema  NEURO: no gross focal findings; cranial nerves grossly intact   SKIN:  Dry, intact, warm to touch    Lab Results: I have personally reviewed pertinent lab results      No results found for: HSTNI0, HSTNI2, HSTNI4, HSTNI  No results found for: NTBNP  Lab Results   Component Value Date    TRIG 93 03/19/2022    HDL 40 (L) 03/19/2022    LDLCALC 108 (H) 03/19/2022     Lab Results   Component Value Date    K 3 7 03/19/2022    CO2 30 03/19/2022    CL 103 2022    BUN 9 2022    CREATININE 0 56 (L) 2022    ALT 21 2019    AST 13 2019     Lab Results   Component Value Date    WBC 10 35 (H) 2022    HGB 11 8 2022    HCT 38 0 2022    MCV 84 2022     2022     No results found for: INR      Imaging: I have personally reviewed pertinent reports  EKG:   Date: 22  Interpretation: NSR  Normal axis and intervals  No ischemic changes  Borderline LVH one aVL criteria  Low voltage on precordial leads  ECHO:  Results for orders placed during the hospital encounter of 19    Echo complete with contrast if indicated    Narrative  [facility] [address]  [address]  [phone]    Transthoracic Echocardiogram  2D, M-mode, Doppler, and Color Doppler    Study date:  22-Mar-2019    Patient: Leander Elam  MR number: [id number]  Account number: [de-identified]  : 1975  Age: 37 years  Gender: Female  Status: Outpatient  Location: [address] [place] [address] [facility]  Height: 63 in  Weight: 296 3 lb  BP: 158/ 98 mmHg    Indications: Murmur  Diagnoses: I10  - Essential (primary) hypertension, R01 1 - Cardiac murmur, unspecified, R60 9 - Edema, unspecified    Sonographer:  Cora Sung RDCS  Primary Physician:  Joni Anaya MD  Group:  [facility]  Cardiology Fellow:  Geovanna Still DO  Cardiology Fellow:  Roxanne Love MD  Interpreting Physician:  Miguel A Cruz MD    SUMMARY    LEFT VENTRICLE:  Systolic function was normal  Ejection fraction was estimated to be 65 %  There were no regional wall motion abnormalities  MITRAL VALVE:  There was trace regurgitation  TRICUSPID VALVE:  There was trace regurgitation  Pulmonary artery systolic pressure was within the normal range  PULMONIC VALVE:  There was trace regurgitation      HISTORY: PRIOR HISTORY: chronic GERD, DM, hypertension    PROCEDURE: The study was performed in the [address]  This was a routine study  The transthoracic approach was used  The study included complete 2D imaging, M-mode, complete spectral Doppler, and color Doppler  The  heart rate was 74 bpm, at the start of the study  Images were obtained from the parasternal, apical, subcostal, and suprasternal notch acoustic windows  Image quality was adequate  LEFT VENTRICLE: Size was normal  Systolic function was normal  Ejection fraction was estimated to be 65 %  There were no regional wall motion abnormalities  Wall thickness was normal  DOPPLER: Left ventricular diastolic function parameters  were normal for the patient's age  RIGHT VENTRICLE: The size was normal  Systolic function was normal  Wall thickness was normal     LEFT ATRIUM: Size was normal     RIGHT ATRIUM: Size was normal     MITRAL VALVE: There was no annular calcification  Valve structure was normal  There was normal leaflet separation  DOPPLER: The transmitral velocity was within the normal range  There was no evidence for stenosis  There was trace  regurgitation  AORTIC VALVE: The valve was trileaflet  Leaflets exhibited normal thickness and normal cuspal separation  DOPPLER: Transaortic velocity was within the normal range  There was no evidence for stenosis  There was no regurgitation  TRICUSPID VALVE: The valve structure was normal  There was normal leaflet separation  DOPPLER: The transtricuspid velocity was within the normal range  There was no evidence for stenosis  There was trace regurgitation  Pulmonary artery  systolic pressure was within the normal range  The tricuspid jet envelope definition was inadequate for estimation of RV systolic pressure  There are no indirect findings (abnormal RV volume or geometry, altered pulmonary flow velocity  profile, or leftward septal displacement) which would suggest moderate or severe pulmonary hypertension      PULMONIC VALVE: Leaflets exhibited normal thickness, no calcification, and normal cuspal separation  DOPPLER: The transpulmonic velocity was within the normal range  There was trace regurgitation  PERICARDIUM: There was no pericardial effusion  The pericardium was normal in appearance  AORTA: The root exhibited normal size  SYSTEMIC VEINS: IVC: The inferior vena cava was normal in size and course  Respirophasic changes were normal     SYSTEM MEASUREMENT TABLES    2D  %FS: 43 89 %  Ao Diam: 2 92 cm  EDV(Teich): 116 1 ml  EF(Cube): 82 34 %  EF(Teich): 74 92 %  ESV(Cube): 21 56 ml  ESV(Teich): 29 12 ml  IVSd: 1 cm  LA Area: 16 71 cm2  LA Diam: 3 84 cm  LVEDV MOD A4C: 131 11 ml  LVEF MOD A4C: 75 19 %  LVESV MOD A4C: 32 53 ml  LVIDd: 4 96 cm  LVIDs: 2 78 cm  LVLd A4C: 8 52 cm  LVLs A4C: 6 81 cm  LVPWd: 1 cm  RA Area: 12 5 cm2  RV Diam : 3 49 cm  SI(Cube): 43 89 ml/m2  SI(Teich): 37 98 ml/m2  SV MOD A4C: 98 58 ml  SV(Cube): 100 52 ml  SV(Teich): 86 98 ml    CW  TR Vmax: 2 57 m/s  TR maxP 5 mmHg    MM  TAPSE: 2 68 cm    PW  AVC: 338 66 ms  E': 0 09 m/s  E/E': 10 24  MV A Ayaan: 0 95 m/s  MV Dec [place]: 5 07 m/s2  MV DecT: 182 03 ms  MV E Ayaan: 0 92 m/s  MV E/A Ratio: 0 97    [employer] Accredited Echocardiography Laboratory    Prepared and electronically signed by    Chaya Hamilton MD  Signed 22-Mar-2019 10:51:17      Assessment/Plan     Assessment:    1  Preop for Bariatric Surgery  --> TTE (2019): EF 65%  [address]  Trivial multi-vavular regurgitation   --> Exertional SOB likely in setting of deconditioning  2  Hypertension  --> BP in office 130/70 with HR   3  DM2  4   ANGE      Plan:  Jw Lang Score 0 1% risk of MACE intraoperatively or up to 30 days post-op  --> Patient may proceed with surgery with acceptable risk as no further work up will alter present managment  - c/w Lisinopril-HCTZ 10-12 5mg PO Daily  - Will obtain a TTE, does not need to get before surgery      Case discussed and reviewed with Dr Luisa Cervantes who agrees with my assessment and plan     Thank you for involving us in the care of your patient  Kaci Hays MD  Cardiology Fellow PGY-6      ==========================================================================================    Epic/ Allscripts/Care Everywhere records reviewed:     ** Please Note: Fluency DirectDictation voice to text software may have been used in the creation of this document   **

## 2022-07-06 ENCOUNTER — TELEPHONE (OUTPATIENT)
Dept: FAMILY MEDICINE CLINIC | Facility: CLINIC | Age: 47
End: 2022-07-06

## 2022-07-06 DIAGNOSIS — R79.89 ABNORMAL TSH: Primary | ICD-10-CM

## 2022-07-06 NOTE — TELEPHONE ENCOUNTER
Spoke with patient over the phone regarding abnormal TSH and Vit D  Explained that I want to further evaluate for hyperthyroidism with a Thyroid antibody panel  Patient's Vit D improved and is >20 so her Vit D level is sufficient  Counseled patient on continuing to take her multivitamin which includes Vit D supplementation  Patient has f/u appt on 7/11/22 with me and will try to obtain lab work prior to appt  She understands and agrees with the plan

## 2022-07-08 ENCOUNTER — APPOINTMENT (OUTPATIENT)
Dept: LAB | Facility: HOSPITAL | Age: 47
End: 2022-07-08
Payer: COMMERCIAL

## 2022-07-08 DIAGNOSIS — R79.89 ABNORMAL TSH: ICD-10-CM

## 2022-07-08 PROCEDURE — 86800 THYROGLOBULIN ANTIBODY: CPT

## 2022-07-08 PROCEDURE — 86376 MICROSOMAL ANTIBODY EACH: CPT

## 2022-07-08 PROCEDURE — 36415 COLL VENOUS BLD VENIPUNCTURE: CPT

## 2022-07-09 LAB
THYROGLOB AB SERPL-ACNC: <1 IU/ML (ref 0–0.9)
THYROPEROXIDASE AB SERPL-ACNC: 32 IU/ML (ref 0–34)

## 2022-07-11 ENCOUNTER — ANNUAL EXAM (OUTPATIENT)
Dept: FAMILY MEDICINE CLINIC | Facility: CLINIC | Age: 47
End: 2022-07-11

## 2022-07-11 VITALS
WEIGHT: 293 LBS | OXYGEN SATURATION: 98 % | SYSTOLIC BLOOD PRESSURE: 124 MMHG | HEIGHT: 61 IN | DIASTOLIC BLOOD PRESSURE: 88 MMHG | RESPIRATION RATE: 18 BRPM | TEMPERATURE: 97 F | HEART RATE: 85 BPM | BODY MASS INDEX: 55.32 KG/M2

## 2022-07-11 DIAGNOSIS — E11.9 TYPE 2 DIABETES MELLITUS WITHOUT COMPLICATION, WITHOUT LONG-TERM CURRENT USE OF INSULIN (HCC): ICD-10-CM

## 2022-07-11 DIAGNOSIS — Z11.3 SCREENING FOR STD (SEXUALLY TRANSMITTED DISEASE): ICD-10-CM

## 2022-07-11 DIAGNOSIS — Z01.419 ENCOUNTER FOR GYNECOLOGICAL EXAMINATION: Primary | ICD-10-CM

## 2022-07-11 LAB — SL AMB POCT HEMOGLOBIN AIC: 6.2 (ref ?–6.5)

## 2022-07-11 PROCEDURE — 3725F SCREEN DEPRESSION PERFORMED: CPT | Performed by: FAMILY MEDICINE

## 2022-07-11 PROCEDURE — 87491 CHLMYD TRACH DNA AMP PROBE: CPT

## 2022-07-11 PROCEDURE — 87591 N.GONORRHOEAE DNA AMP PROB: CPT

## 2022-07-11 PROCEDURE — 99214 OFFICE O/P EST MOD 30 MIN: CPT | Performed by: FAMILY MEDICINE

## 2022-07-11 PROCEDURE — 0503F POSTPARTUM CARE VISIT: CPT | Performed by: FAMILY MEDICINE

## 2022-07-11 PROCEDURE — 3044F HG A1C LEVEL LT 7.0%: CPT | Performed by: FAMILY MEDICINE

## 2022-07-11 PROCEDURE — G0145 SCR C/V CYTO,THINLAYER,RESCR: HCPCS

## 2022-07-11 PROCEDURE — 1036F TOBACCO NON-USER: CPT | Performed by: FAMILY MEDICINE

## 2022-07-11 PROCEDURE — 3044F HG A1C LEVEL LT 7.0%: CPT | Performed by: PHYSICIAN ASSISTANT

## 2022-07-11 PROCEDURE — 3074F SYST BP LT 130 MM HG: CPT | Performed by: FAMILY MEDICINE

## 2022-07-11 PROCEDURE — 3079F DIAST BP 80-89 MM HG: CPT | Performed by: FAMILY MEDICINE

## 2022-07-11 PROCEDURE — 83036 HEMOGLOBIN GLYCOSYLATED A1C: CPT | Performed by: FAMILY MEDICINE

## 2022-07-11 NOTE — PROGRESS NOTES
ANNUAL GYN VISIT:    Armin Melara was seen today for gynecologic exam and diabetes  Diagnoses and all orders for this visit:    Encounter for gynecological examination  -     Liquid-based pap, screening    Type 2 diabetes mellitus without complication, without long-term current use of insulin ([facility])  -     POCT hemoglobin A1c    Screening for STD (sexually transmitted disease)  -     Chlamydia/GC amplified DNA by PCR; Future  -     Chlamydia/GC amplified DNA by PCR       Type 2 DM  Controlled  HA1C 6 8 today, 07/12/22  Prior HgbA1C 6 8 on 04/06/22  Home regimen: Metformin 500 mg BID and Victoza daily  Patient states she is compliant w/ medication   - Fundoscopy: Performed in 2021 from outside [facility]  - Urine MicroAlb:Cr wnl 12/22/21  - Diabetic foot exam: normal 48/71/02  - Complications: none  - Weight is reduced at 134Kg  Pt following up with Bariatric surgery  - Discussed general issues diabetes management, diet control and exercise regimen    - Continue home regimen  F/u in 6 months  Lab Results   Component Value Date/Time    HGBA1C 6 2 07/11/2022 05:22 PM    GLUC 174 02/15/2019 10:03 AM    BUN 9 03/19/2022 10:13 AM    CREATININE 0 56 (L) 03/19/2022 10:13 AM    CHOLESTEROL 167 03/19/2022 10:13 AM    TRIG 93 03/19/2022 10:13 AM    HDL 40 (L) 03/19/2022 10:13 AM    LDLCALC 108 (H) 03/19/2022 10:13 AM    CREATININEUR 72 8 12/22/2021 01:39 PM    LABMICR 8 2 12/22/2021 01:39 PM    MICROALBCRE 11 12/22/2021 01:39 PM         Subjective      Jett Murguia is a 50 y o  female who presents for annual well woman exam  Periods are regular every 28-30 days, lasting 5 days  No intermenstrual bleeding, spotting, or discharge  GYN:  · No vaginal discharge, labial erythema or lesions, dyspareunia  · Menarche at 13yo  · Menses are regular, q 28-30 days, lasting 5 days  · Contraception: none  · Patient is not sexually active  · Last pap smear was before 2015  Results were normal per pt report    · Tubal ligation  gynecologic surgeries  OB:  ·  female  · Pregnancies were Vaginal , Vaginal , C section   :  · No dysuria, urinary frequency - drink a lot of water, DM    · No hematuria, flank pain, incontinence  · Patient previously had urinary incontinence 4-5 months ago, now its resolved  Breast:  · No breast mass, skin changes, dimpling, reddening, nipple retraction  · Patient does not do self-breast exam  · No breast discharge  · Last mammogram: never  · Patient does not have a family history of breast, endometrial, or ovarian ca  General:  · Diet: Well balanced, small portions  Whole wheat  Chicken, beef  Eating out once a week  · Exercise: 10 min walking per day on treadmill which is strenuous  · Work:  for [employer]  Sedentary job  · ETOH use: none  · Tobacco use: none  · Recreational drug use: none    Screening:  · Cervical cancer: last pap smear prior to   Previously all normal  · Breast cancer: last mammogram never  Ordered  · Colon cancer: last colonoscopy not yet, referred     · STD screening: never dx in the past     Review of Systems   Constitutional: Negative for fever  HENT: Positive for congestion  Negative for sore throat  Eyes: Negative for visual disturbance  Respiratory: Positive for cough  Cardiovascular: Positive for leg swelling  Negative for palpitations  Genitourinary: Negative for dysuria, genital sores and hematuria  Objective   Vitals:    22 1545   BP: 124/88   Pulse: 85   Resp: 18   Temp: (!) 97 °F (36 1 °C)   SpO2: 98%           Physical Exam  Exam conducted with a chaperone present  Constitutional:       General: She is not in acute distress  Appearance: She is obese  She is not toxic-appearing  HENT:      Head: Normocephalic and atraumatic  Right Ear: External ear normal       Left Ear: External ear normal       Mouth/Throat:      Mouth: Mucous membranes are dry  Pharynx: Oropharynx is clear  Eyes:      Extraocular Movements: Extraocular movements intact  Conjunctiva/sclera: Conjunctivae normal       Pupils: Pupils are equal, round, and reactive to light  Cardiovascular:      Rate and Rhythm: Normal rate and regular rhythm  Heart sounds: Normal heart sounds  No murmur heard  No friction rub  No gallop  Pulmonary:      Effort: Pulmonary effort is normal  No respiratory distress  Breath sounds: Normal breath sounds  No wheezing, rhonchi or rales  Abdominal:      General: Bowel sounds are normal       Palpations: Abdomen is soft  Tenderness: There is no abdominal tenderness  Genitourinary:     General: Normal vulva  Pubic Area: No rash or pubic lice  Salvador stage (genital): 5  Labia:         Right: No rash  Left: No rash  Vagina: Normal       Cervix: No friability  Comments: Unable to visualize cervical os  Sample for Pap received from lateral cervix  Musculoskeletal:      Right lower leg: No edema  Left lower leg: No edema  Skin:     General: Skin is warm and dry  Neurological:      Mental Status: She is alert  Mental status is at baseline  Psychiatric:         Mood and Affect: Mood normal          Behavior: Behavior normal          Thought Content:  Thought content normal          Judgment: Judgment normal

## 2022-07-12 LAB
C TRACH DNA SPEC QL NAA+PROBE: NEGATIVE
N GONORRHOEA DNA SPEC QL NAA+PROBE: NEGATIVE

## 2022-07-12 NOTE — ASSESSMENT & PLAN NOTE
Controlled  HA1C 6 8 today, 07/12/22  Prior HgbA1C 6 8 on 04/06/22  Home regimen: Metformin 500 mg BID and Victoza daily  Patient states she is compliant w/ medication   - Fundoscopy: Performed in 2021 from outside [facility]  - Urine MicroAlb:Cr wnl 12/22/21  - Diabetic foot exam: normal 11/60/41  - Complications: none  - Weight is reduced at 134Kg  Pt following up with Bariatric surgery  - Discussed general issues diabetes management, diet control and exercise regimen    - Continue home regimen  F/u in 6 months         Lab Results   Component Value Date/Time    HGBA1C 6 2 07/11/2022 05:22 PM    GLUC 174 02/15/2019 10:03 AM    BUN 9 03/19/2022 10:13 AM    CREATININE 0 56 (L) 03/19/2022 10:13 AM    CHOLESTEROL 167 03/19/2022 10:13 AM    TRIG 93 03/19/2022 10:13 AM    HDL 40 (L) 03/19/2022 10:13 AM    LDLCALC 108 (H) 03/19/2022 10:13 AM    CREATININEUR 72 8 12/22/2021 01:39 PM    LABMICR 8 2 12/22/2021 01:39 PM    MICROALBCRE 11 12/22/2021 01:39 PM

## 2022-07-12 NOTE — PROGRESS NOTES
Behavioral Health Follow Up Note:       7 / 12  Weight Check:  Dr Melissa Kraft     Starting weight 299  #  Today's weight  293 5  #  Surgery goal   269  #     Surgery month:  Aug/Sept  Surgery deadline: December     Mental health and wellness -    Tried a protein shake (Premeir) and felt bloated afterwards  Taking her vitamins and sometimes they upset her stomach  Included Biotin  Has been retaining water over the last two weeks and feels "swollen"  Feels good today  Will try to drink the protein shake in increments  Some frustrations around the changes   Researched sources of protein  Has a friend who is also in the bariatric process  Really wants to like and tolerate the protein shakes  Eating behaviors -    Drinking more water and less juice  Watching her portion sizes  Working on 30/60  Struggles with the 30 prior  still needs to incorporate more healthy choices  At 11:30am she has not eaten anything yet  Waits for hunger to tell her to eat       Activity -  trying to walk more  Trying to walk for 10 minutes  As she can tolerate      Progress toward program requirements     Workflow:  · Psych and/or D+A Clearance: N/A  · PCP Letter: handed SW the letter  · Support Group: pending   Provided her with the Live support group option   · Surgeon Appt : 6/22/2022  · EGD : 9/21/2022  · Cardiac Risk Assessment: 7/5/2022   7/15/2022  (echo)  · Sleep Studies: 5/17/2022 - continue using the CPAP    Ordered a new one    · Bloodwork: pending

## 2022-07-14 ENCOUNTER — OFFICE VISIT (OUTPATIENT)
Dept: BARIATRICS | Facility: CLINIC | Age: 47
End: 2022-07-14

## 2022-07-14 VITALS — BODY MASS INDEX: 55.09 KG/M2 | WEIGHT: 293 LBS

## 2022-07-14 DIAGNOSIS — E66.01 CLASS 3 SEVERE OBESITY DUE TO EXCESS CALORIES WITHOUT SERIOUS COMORBIDITY WITH BODY MASS INDEX (BMI) OF 50.0 TO 59.9 IN ADULT (HCC): Primary | ICD-10-CM

## 2022-07-14 PROCEDURE — RECHECK

## 2022-07-15 ENCOUNTER — PREP FOR PROCEDURE (OUTPATIENT)
Dept: BARIATRICS | Facility: CLINIC | Age: 47
End: 2022-07-15

## 2022-07-15 ENCOUNTER — HOSPITAL ENCOUNTER (OUTPATIENT)
Dept: NON INVASIVE DIAGNOSTICS | Facility: CLINIC | Age: 47
Discharge: HOME | End: 2022-07-15
Payer: COMMERCIAL

## 2022-07-15 VITALS
SYSTOLIC BLOOD PRESSURE: 124 MMHG | HEIGHT: 61 IN | HEART RATE: 80 BPM | WEIGHT: 293 LBS | DIASTOLIC BLOOD PRESSURE: 88 MMHG | BODY MASS INDEX: 55.32 KG/M2

## 2022-07-15 DIAGNOSIS — E66.01 MORBID OBESITY (HCC): ICD-10-CM

## 2022-07-15 DIAGNOSIS — R06.02 SOB (SHORTNESS OF BREATH): ICD-10-CM

## 2022-07-15 LAB
AORTIC ROOT: 2.8 CM
APICAL FOUR CHAMBER EJECTION FRACTION: 53 %
ASCENDING AORTA: 3.2 CM
E WAVE DECELERATION TIME: 157 MS
FRACTIONAL SHORTENING: 37 % (ref 28–44)
INTERVENTRICULAR SEPTUM IN DIASTOLE (PARASTERNAL SHORT AXIS VIEW): 1 CM
INTERVENTRICULAR SEPTUM: 1 CM (ref 0.6–1.1)
LAAS-AP2: 12.6 CM2
LAAS-AP4: 15.1 CM2
LAB AP GYN PRIMARY INTERPRETATION: NORMAL
LEFT ATRIUM AREA SYSTOLE SINGLE PLANE A4C: 14.2 CM2
LEFT ATRIUM SIZE: 3.7 CM
LEFT INTERNAL DIMENSION IN SYSTOLE: 2.4 CM (ref 2.1–4)
LEFT VENTRICLE DIASTOLIC VOLUME (MOD BIPLANE): 106 ML
LEFT VENTRICLE SYSTOLIC VOLUME (MOD BIPLANE): 50 ML
LEFT VENTRICULAR INTERNAL DIMENSION IN DIASTOLE: 3.8 CM (ref 3.5–6)
LEFT VENTRICULAR POSTERIOR WALL IN END DIASTOLE: 1 CM
LEFT VENTRICULAR STROKE VOLUME: 42 ML
LV EF: 53 %
LVSV (TEICH): 42 ML
Lab: NORMAL
MV E'TISSUE VEL-SEP: 10 CM/S
MV PEAK A VEL: 0.97 M/S
MV PEAK E VEL: 99 CM/S
MV STENOSIS PRESSURE HALF TIME: 46 MS
MV VALVE AREA P 1/2 METHOD: 4.78 CM2
RIGHT ATRIUM AREA SYSTOLE A4C: 13.2 CM2
RIGHT VENTRICLE ID DIMENSION: 4 CM
SL CV LEFT ATRIUM LENGTH A2C: 3.6 CM
SL CV LV EF: 60
SL CV PED ECHO LEFT VENTRICLE DIASTOLIC VOLUME (MOD BIPLANE) 2D: 63 ML
SL CV PED ECHO LEFT VENTRICLE SYSTOLIC VOLUME (MOD BIPLANE) 2D: 21 ML
TR MAX PG: 27 MMHG
TR PEAK VELOCITY: 2.6 M/S
TRICUSPID VALVE PEAK REGURGITATION VELOCITY: 2.61 M/S

## 2022-07-15 PROCEDURE — 93306 TTE W/DOPPLER COMPLETE: CPT | Performed by: INTERNAL MEDICINE

## 2022-07-15 PROCEDURE — 93306 TTE W/DOPPLER COMPLETE: CPT

## 2022-07-18 ENCOUNTER — TELEPHONE (OUTPATIENT)
Dept: CARDIOLOGY CLINIC | Facility: CLINIC | Age: 47
End: 2022-07-18

## 2022-07-18 NOTE — TELEPHONE ENCOUNTER
----- Message from Kirill Denis MD sent at 7/16/2022  8:34 AM EDT -----  Can we inform the patient that her Echo was normal  Than abhilash

## 2022-07-19 ENCOUNTER — OFFICE VISIT (OUTPATIENT)
Dept: BARIATRICS | Facility: CLINIC | Age: 47
End: 2022-07-19
Payer: COMMERCIAL

## 2022-07-19 VITALS
DIASTOLIC BLOOD PRESSURE: 86 MMHG | BODY MASS INDEX: 53.92 KG/M2 | HEIGHT: 62 IN | SYSTOLIC BLOOD PRESSURE: 128 MMHG | HEART RATE: 85 BPM | OXYGEN SATURATION: 96 % | WEIGHT: 293 LBS

## 2022-07-19 DIAGNOSIS — I10 ESSENTIAL HYPERTENSION: ICD-10-CM

## 2022-07-19 DIAGNOSIS — G47.33 OSA (OBSTRUCTIVE SLEEP APNEA): ICD-10-CM

## 2022-07-19 DIAGNOSIS — E66.01 MORBID (SEVERE) OBESITY DUE TO EXCESS CALORIES (HCC): Primary | ICD-10-CM

## 2022-07-19 DIAGNOSIS — R79.89 ABNORMAL TSH: ICD-10-CM

## 2022-07-19 PROCEDURE — 99214 OFFICE O/P EST MOD 30 MIN: CPT | Performed by: PHYSICIAN ASSISTANT

## 2022-07-19 NOTE — PROGRESS NOTES
Assessment/Plan:    Diagnoses and all orders for this visit:    Morbid (severe) obesity due to excess calories ([facility] )    Type 2 diabetes mellitus without complication, without long-term current use of insulin ([facility])    Abnormal TSH    Essential hypertension    ANGE (obstructive sleep apnea)         Interested in Merry-En-Y Gastric Bypass with Dr Erin Rice  10% Weight loss-Results pending ;   Screening labs-[facility]   Support Group-Not Completed  Cardiac Risk Assessment-Completed  Upper Endoscopy-scheduled  Sleep Medicine Assessment-has ANGE and wearing cpap  Alcohol and nicotine use is not recommended following bariatric surgery  NSAIDs should be avoided following bariatric surgery  Advised that pregnancy should be avoided following bariatric surgery for 12-18 months and should not solely rely on OCP and consider additional/alternative sources for contraception due to potential absorption issues-Completed    Abnormal TSH   - had T4 and antibody panel with PCP - told TSH was fine     HTN  - continue antihypertensives as prescribed     Follow up for 7/12 wt check   Goals:  Food log (ie ) [http://www  Team-Match,sparkpeople  com,loseit com,calorieking  com,etc]www  myfitnesspal com,sparkpeople  com,loseit com,calorieking  com,etc  baritastic  No sugary beverages  At least 64oz of water daily  Increase physical activity by 10 minutes daily   Gradually increase physical activity to a goal of 5 days per week for 30 minutes of MODERATE intensity PLUS 2 days per week of FULL BODY resistance training  Follow lessons 1-6 in the manual  Follow the 30/60 minute rule  Goal protein intake of 60-80 grams per day  Alcohol and nicotine use is not recommended following bariatric surgery  NSAIDs (Motrin, Advil, Aleve, Naproxen, ibuprofen, etc) should be avoided following bariatric surgery)    Subjective:   Chief Complaint   Patient presents with   [address] [place] [address] Patient Consult      Patient ID: Alex George is a 50 y o  female with excess weight/obesity here to pursue weight management  Past Medical History:   Diagnosis Date    Hypertension     Pre-diabetes      HPI:  The patient has been:  Following the lessons in the manual- yes  Practicing the 30/60 minute rule- yes  Food logging- no  Exercise- walking daily   Alcohol- no  Tobacco- no    The following portions of the patient's history were reviewed and updated as appropriate: allergies, current medications, past family history, past medical history, past social history, past surgical history and problem list   Review of Systems   Constitutional: Negative  Respiratory: Negative  Cardiovascular: Negative  Gastrointestinal: Negative  Neurological: Negative  Psychiatric/Behavioral: Negative  Objective:  /86 (BP Location: Left arm, Patient Position: Sitting, Cuff Size: Large)   Pulse 85   Ht 5' 1 5" (1 562 m)   Wt 134 kg (294 lb 8 oz)   LMP 06/30/2022   SpO2 96%   BMI 54 74 kg/m²   Physical Exam  Vitals and nursing note reviewed  Constitutional:       Appearance: Normal appearance  She is obese  HENT:      Head: Normocephalic and atraumatic  Eyes:      Extraocular Movements: Extraocular movements intact  Pupils: Pupils are equal, round, and reactive to light  Cardiovascular:      Rate and Rhythm: Normal rate  Pulmonary:      Effort: Pulmonary effort is normal    Musculoskeletal:         General: Normal range of motion  Cervical back: Normal range of motion  Skin:     General: Skin is warm and dry  Neurological:      General: No focal deficit present  Mental Status: She is alert and oriented to person, place, and time  Psychiatric:         Mood and Affect: Mood normal      30 minute appointment discussing low calorie diet, exercise and life style changes

## 2022-07-29 ENCOUNTER — OFFICE VISIT (OUTPATIENT)
Dept: BARIATRICS | Facility: CLINIC | Age: 47
End: 2022-07-29
Payer: COMMERCIAL

## 2022-07-29 VITALS
DIASTOLIC BLOOD PRESSURE: 84 MMHG | WEIGHT: 293 LBS | SYSTOLIC BLOOD PRESSURE: 110 MMHG | HEART RATE: 93 BPM | HEIGHT: 62 IN | BODY MASS INDEX: 53.92 KG/M2 | TEMPERATURE: 96.8 F

## 2022-07-29 DIAGNOSIS — I10 ESSENTIAL HYPERTENSION: ICD-10-CM

## 2022-07-29 DIAGNOSIS — K21.9 CHRONIC GERD: ICD-10-CM

## 2022-07-29 DIAGNOSIS — E11.9 TYPE 2 DIABETES MELLITUS WITHOUT COMPLICATION, WITHOUT LONG-TERM CURRENT USE OF INSULIN (HCC): Primary | ICD-10-CM

## 2022-07-29 DIAGNOSIS — E66.01 MORBID (SEVERE) OBESITY DUE TO EXCESS CALORIES (HCC): ICD-10-CM

## 2022-07-29 DIAGNOSIS — Z01.818 PREOP TESTING: ICD-10-CM

## 2022-07-29 DIAGNOSIS — G47.33 OSA (OBSTRUCTIVE SLEEP APNEA): ICD-10-CM

## 2022-07-29 PROCEDURE — 3008F BODY MASS INDEX DOCD: CPT | Performed by: FAMILY MEDICINE

## 2022-07-29 PROCEDURE — 99213 OFFICE O/P EST LOW 20 MIN: CPT | Performed by: NURSE PRACTITIONER

## 2022-07-29 NOTE — PROGRESS NOTES
Assessment/Plan:    Diagnoses and all orders for this visit:    Type 2 diabetes mellitus without complication, without long-term current use of insulin ([facility] [address] )  - on metformin; last A1C in 07/2022 - 6 2%  Continue with medication and f/u up with PCP as scheduled  ANGE (obstructive sleep apnea)  - currently using CPAP machine  Advised to bring CPAP to hospital on day of surgery  Continue to f/u with sleep medicine as scheduled  Essential hypertension  - on lisinopril-hydrochlorothiazide once daily  Continue to monitor  BP in office within limits  Chronic GERD  - no reflux at this time as long she takes her medications  Continue pepcid 20 mg PO BID    Morbid (severe) obesity due to excess calories ([facility] [address] )         Interested in Merry-En-Y Gastric Bypass with Dr Boo Smith  10% Weight loss-Results pending; needs to be 284 lbs to submit to insurance  Screening labs-not completed - needs CMP; CBC  Support Group-Not Completed  Cardiac Risk Assessment-Completed  Upper Endoscopy-Not Completed; scheduled for 09/21/2022  Sleep Medicine Assessment- wears a CPAP - instructed to take with her on day of surgery  Alcohol and nicotine use is not recommended following bariatric surgery  NSAIDs should be avoided following bariatric surgery  Advised that pregnancy should be avoided following bariatric surgery for 12-18 months and should not solely rely on OCP and consider additional/alternative sources for contraception due to potential absorption issues-Completed    Follow up in approximately AS SCHEDULED   Goals:  Food log (ie ) [http://www  NetDevices,sparkpeople  com,loseit com,calorieking  com,etc]www  myfitnesspal com,sparkpeople  com,loseit com,calorieking  com,etc  baritastic  No sugary beverages  At least 64oz of water daily  Increase physical activity by 10 minutes daily   Gradually increase physical activity to a goal of 5 days per week for 30 minutes of MODERATE intensity PLUS 2 days per week of FULL BODY resistance training  Follow lessons 1-6 in the manual  Follow the 30/60 minute rule  Goal protein intake of 60-80 grams per day  Alcohol and nicotine use is not recommended following bariatric surgery  NSAIDs (Motrin, Advil, Aleve, Naproxen, ibuprofen, etc) should be avoided following bariatric surgery)    Subjective:   Chief Complaint   Patient presents with    Follow-up     PRE OP WT CHECK 9/12     Patient ID: Jett Murguia is a 50 y o  female with excess weight/obesity here to pursue weight management  Past Medical History:   Diagnosis Date    Hypertension     Pre-diabetes      HPI:  The patient has been:  Following the lessons in the manual- yes  Practicing the 30/60 minute rule- yes  Food logging- yes  Exercise- no limited due to leg pain; encourage as much activity as possible  Alcohol- no  Tobacco- no  NSAIDs- no  The following portions of the patient's history were reviewed and updated as appropriate: allergies, current medications, past family history, past medical history, past social history, past surgical history and problem list   Review of Systems   Constitutional: Negative  Respiratory: Negative  Cardiovascular: Negative  Gastrointestinal: Negative  Musculoskeletal: Positive for arthralgias (right leg)  Neurological: Negative  Psychiatric/Behavioral: Negative  Objective:  /84 (BP Location: Left arm, Patient Position: Sitting, Cuff Size: Standard)   Pulse 93   Temp (!) 96 8 °F (36 °C) (Tympanic)   Ht 5' 1 5" (1 562 m)   Wt 133 kg (294 lb)   LMP 06/30/2022   BMI 54 65 kg/m²   Physical Exam  Vitals and nursing note reviewed  Constitutional:       Appearance: Normal appearance  She is obese  Cardiovascular:      Rate and Rhythm: Normal rate and regular rhythm  Pulses: Normal pulses  Heart sounds: Normal heart sounds  Pulmonary:      Effort: Pulmonary effort is normal       Breath sounds: Normal breath sounds     Abdominal:      General: Bowel sounds are normal  Palpations: Abdomen is soft  Musculoskeletal:         General: Normal range of motion  Skin:     General: Skin is warm and dry  Neurological:      General: No focal deficit present  Mental Status: She is alert and oriented to person, place, and time  Psychiatric:         Mood and Affect: Mood normal          Behavior: Behavior normal          Thought Content:  Thought content normal          Judgment: Judgment normal

## 2022-08-15 ENCOUNTER — TELEPHONE (OUTPATIENT)
Dept: FAMILY MEDICINE CLINIC | Facility: CLINIC | Age: 47
End: 2022-08-15

## 2022-08-15 DIAGNOSIS — E11.9 TYPE 2 DIABETES MELLITUS WITHOUT COMPLICATION, WITHOUT LONG-TERM CURRENT USE OF INSULIN (HCC): ICD-10-CM

## 2022-08-15 NOTE — TELEPHONE ENCOUNTER
Patient is calling regarding metFORMIN (GLUCOPHAGE) 500 mg tablet  was sent a message from pharmacy   PCP opted out patient want to find out why patient can be reached at [phone]

## 2022-08-16 NOTE — PROGRESS NOTES
Name             Martina Zaman    10/12 weight check   Last time weight 294  Today's weight 290 5      Surgery month: Aug/Sept   Surgery deadline: Dec    Behavioral Health Follow Up Note:    Surgeon: Dr Dillon Braden and wellness - Patient presents to the office today for weight check and support visit  The patient shared that she feels frustrated with her leg as this is slowing her activity level  She reports pain when placing to much pressure on it  The patient explained that yesterday she went to a support group and she learned a lot of things she was not aware of  For example understanding the 30/60 rule  Continues to use her CPAP  Eating behaviors - stopped drinking juices only water, one cup of coffee in the  morning using sugar free creamer and  Splenda  Working on drinking small cups of protein drink because if she drinks one it  upsets her stop  Snacking when she does not forget  Sw reviewed using a timer  Serving herself less and increased salad and vegetables  Being more cautious by reading ingredients when she food shops  Practicing the 30/60 rule  Hydration  32 oz of water daily  Activity -   walking because of her leg as it is swollen and difficulty walking  Sw reviewed activities she may be able to do sitting working her upper arms  Progress toward program requirements  Workflow:  · Psych and/or D+A Clearance: N/A  · PCP Letter: handed SW the letter  22  · Support Group:  8/10/22  · Surgeon Appt : 2022  · WR : 5432  · Cardiac Risk Assessment: 2022   7/15/2022  (echo)  · Sleep Studies: 2022 - continue using the CPAP    Ordered a new one  · Bloodwork: pending      Reviewed and discussed  Patient educated and handouts provided    Adequate hydration  Exercise  Meal planning and preparation  Lifestyle changes  Possible problems with poor eating habits  Techniques for self monitoring and keeping daily food journal    Goal: 1- increase activity level 2-work on monitoring her food intake  3- tasting different foods that contain high protein     Next appointment :9/2 RD

## 2022-08-19 ENCOUNTER — OFFICE VISIT (OUTPATIENT)
Dept: BARIATRICS | Facility: CLINIC | Age: 47
End: 2022-08-19

## 2022-08-19 VITALS — BODY MASS INDEX: 54 KG/M2 | WEIGHT: 290.5 LBS

## 2022-08-19 DIAGNOSIS — E66.01 MORBID (SEVERE) OBESITY DUE TO EXCESS CALORIES (HCC): Primary | ICD-10-CM

## 2022-08-19 PROCEDURE — RECHECK

## 2022-09-02 ENCOUNTER — OFFICE VISIT (OUTPATIENT)
Dept: BARIATRICS | Facility: CLINIC | Age: 47
End: 2022-09-02

## 2022-09-02 VITALS — BODY MASS INDEX: 53.89 KG/M2 | WEIGHT: 289.9 LBS

## 2022-09-02 DIAGNOSIS — E66.01 CLASS 3 SEVERE OBESITY DUE TO EXCESS CALORIES WITHOUT SERIOUS COMORBIDITY WITH BODY MASS INDEX (BMI) OF 50.0 TO 59.9 IN ADULT (HCC): Primary | ICD-10-CM

## 2022-09-02 PROCEDURE — RECHECK: Performed by: DIETITIAN, REGISTERED

## 2022-09-02 NOTE — PROGRESS NOTES
Bariatric Nutrition Assessment Note    Type of surgery    Preop  Surgery Date: TBD- pt has 12 session program requirement   Today is  of program requirement   Surgeon: Dr Paulette Live  50 y o   female     Wt with BMI of 25: 132 4#  Pre-Op Excess Wt: 166 6 lbs  Wt 131 kg (289 lb 14 4 oz)   BMI 53 89 kg/m²    Pt lost 0 6# since last appointment  Down a total of 9# since starting the program  (3% of her body weight )     Wt Readings from Last 3 Encounters:   22 131 kg (289 lb 14 4 oz)   22 132 kg (290 lb 8 oz)   22 133 kg (294 lb)     Mitchell- [place] Equation:  1922 kcal per day   Estimated calories for weight loss 3075-3802 kcal per day  ( 1-2# per wk wt loss - sedentary )  Estimated protein needs 72-90 grams per day (1 2-1 5 gms/kg IBW )   Estimated fluid needs 70 ounces per day (35 ml/kg IBW )      Weight History   Onset of Obesity: Childhood- got worse after birth of her youngest daughter 12 years ago   Family history of obesity: Yes- 2 cousins had surgery   Wt Loss Attempts: eliminated soda, started decreasing portions, atkins diet, diabetic diet, exercise  prescribed Victoza to help decrease her weight   Prescription pills prescribed by doctor 20 years ago but stopped due to side effects     Patient has tried the above for 6 months or more with insufficient weight loss or weight regain, which is why patient has requested to be evaluated for weight loss surgery today  Maximum Wt Lost: 20 pounds with prescription pills   Highest weight was 315#    Review of History and Medications   Past Medical History:   Diagnosis Date    Hypertension     Pre-diabetes      Past Surgical History:   Procedure Laterality Date     SECTION  10/04/2005    TUBAL LIGATION       Social History     Socioeconomic History    Marital status: Single     Spouse name: Not on file    Number of children: 3    Years of education: Not on file    Highest education level: Not on file   Occupational History    Occupation: Behavioral health field   Tobacco Use    Smoking status: Never Smoker    Smokeless tobacco: Never Used   Vaping Use    Vaping Use: Never used   Substance and Sexual Activity    Alcohol use: Never    Drug use: Never    Sexual activity: Not Currently     Birth control/protection: Female Sterilization   Other Topics Concern    Not on file   Social History Narrative    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     Social Determinants of Health     Financial Resource Strain: Low Risk     Difficulty of Paying Living Expenses: Not hard at all   Food Insecurity: No Food Insecurity    Worried About [address] [employer] in the Last Year: Never true    [address] in the Last Year: Never true   Transportation Needs: No Transportation Needs    Lack of Transportation (Medical): No    Lack of Transportation (Non-Medical):  No   Physical Activity: Not on file   Stress: Not on file   Social Connections: Not on file   Intimate Partner Violence: Not on file   Housing Stability: Low Risk     Unable to Pay for Housing in the Last Year: No    Number of Places Lived in the Last Year: 1    Unstable Housing in the Last Year: No       Current Outpatient Medications:     cetirizine (ZyrTEC) 10 mg tablet, Take 1 tablet (10 mg total) by mouth daily as needed for allergies or rhinitis, Disp: 90 tablet, Rfl: 1    famotidine (PEPCID) 20 mg tablet, TAKE 1 TABLET BY MOUTH TWICE A DAY, Disp: 180 tablet, Rfl: 1    fluticasone (FLONASE) 50 mcg/act nasal spray, SPRAY 1 SPRAY INTO EACH NOSTRIL EVERY DAY, Disp: 48 mL, Rfl: 2    Insulin Pen Needle (Pen Needles) 31G X 5 MM MISC, Use in the morning, Disp: 100 each, Rfl: 2    lisinopril-hydrochlorothiazide (PRINZIDE,ZESTORETIC) 10-12 5 MG per tablet, TAKE 1 TABLET BY MOUTH EVERY DAY, Disp: 90 tablet, Rfl: 1    metFORMIN (GLUCOPHAGE) 500 mg tablet, Take 1 tablet (500 mg total) by mouth 2 (two) times a day with meals, Disp: 180 tablet, Rfl: 1    Multiple Vitamins-Minerals (Womens Multi) CAPS, Take by mouth, Disp: , Rfl:     Victoza injection, INJECT 0 6 MG UNDER THE SKIN ONCE DAILY, Disp: 3 mL, Rfl: 2     Food Intake and Lifestyle Assessment   Food Intake Assessment completed via usual diet recall- not much changes since last RD appt: Wakes around 6:20am  7:00am: Breakfast: Drinks coffee and eats about 10 wheat Ritz crackers  Snack: 0   Lunch: 10:00 am: ham and cheese sandwich on whole wheat with zamora  Snack:  Prunes and nuts or dry speciak-k cereal- measures out one cup  Dinner: cooks from 4:30-5pm, eats around 6-6:30pm:    Rice, meat, potatoes, sometimes lettuce or corn but not usually vegetables   Beverage intake: water and coffee  Stopped drinking all soda and juice  Protein supplement: Premier Protein  Pt reports it feels too heavy in her stomach so is dividing it up in 3-4 oz cups  Estimated protein intake per day: 70-90 grams   Estimated fluid intake per day: 1 cup coffee per day  Pt bought 32 oz water bottles  Drinking about one and a half per day=48oz/day  Meals eaten away from home: twice per week   Typical meal pattern: 2-3 meals per day and 0-1 snacks per day  Eating Behaviors: Mindless eating, Emotional eating and Craves sweet foods  Pt continues to report lack of time as main barrier to implementing more meal planning and prepping  Food allergies or intolerances: No Known Allergies  Cultural or Anabaptism considerations: n/a     Physical Assessment    Lab Results   Component Value Date    HGBA1C 6 2 07/11/2022     Physical Activity  Types of exercise: None  Pt states she was walking but now knee pain is preventing her from doing so  Pt is trying to get up more throughout her day  Pt purchased watch that tracks her activity    Current physical limitations: foot pain, back pain, joint pain , SOB    Psychosocial Assessment   Support systems: daughter   Socioeconomic factors: works from home 36 hours per week   Lives with 12year old daughter and her older daughter and grandchild     Nutrition Diagnosis- continued   Diagnosis: Overweight / Obesity (NC-3 3)  Related to: Food and nutrition-related knowledge deficit, Physical inactivity and Excessive energy intake  As Evidenced by: BMI >25, Excessive energy intake and Unintentional weight gain     Nutrition Prescription: Recommend the following diet  1500 kcal / 95 grams protein / 150 grams of carb/ 58 grams of fat, 15 grams of fiber   70 ounces of calorie free beverages     Interventions and Teaching   Discussed pre-op and post-op nutrition guidelines  Patient educated and handouts provided    Surgical changes to stomach / GI  Capacity of post-surgery stomach  Diet progression  Adequate hydration  Sugar and fat restriction to decrease "dumping syndrome"  Fat restriction to decrease steatorrhea  Expected weight loss  Weight loss plateaus/ possibility of weight regain  Exercise  Suggestions for pre-op diet  Nutrition considerations after surgery  Protein supplements  Meal planning and preparation  Appropriate carbohydrate, protein, and fat intake, and food/fluid choices to maximize safe weight loss, nutrient intake, and tolerance   Dietary and lifestyle changes  Possible problems with poor eating habits  Intuitive eating  Techniques for self monitoring and keeping daily food journal  Potential for food intolerance after surgery, and ways to deal with them including: lactose intolerance, nausea, reflux, vomiting, diarrhea, food intolerance, appetite changes, gas  Vitamin / Mineral supplementation of Multivitamin with minerals, Calcium, Vitamin B12, Iron, Fat Soluble vitamins and Vitamin D  Patient is not currently pregnant and doesn't desire to become pregnant a minimum of one year post-op- tubal ligation     Provided patient with 400 to 500 calorie meal options  Provided with 150-200 calories snacks  Provided with healthy shopping list  Provided with suggestions for "quick" grocery shopping and for "quick " meals   Reviewed importance of eating throughout the day to prevent overeating at night due to decreased circulating leptin  Education provided to: patient    Barriers to learning: No barriers identified    Readiness to change: action    Prior research on procedure: discussed with provider and pre-op class    Comprehension: needs reinforcement and verbalizes understanding     Expected Compliance: good    Workflow: (Incomplete in bold):   Psych and/or D+A Clearance: N/A   PCP Letter: done 6/8/2022   Support Group: done 8/10/22   Surgeon Appt done 6/22/2022    EGD scheduled 9/21/2022   Cardiac Risk Assessment done 7/5/2022    Sleep Studies done 5/17/2022    Blood work done 3/19/2022  Needs updated CBC+CMP this month   Nicotine test n/a    12 session  Pre-Operative Program: 11/12 today  12/12 scheduled with SANTIAGO 9/16/22  Final surgeon assessment 9/30/22   Weight Loss 5% to submit to insurance - 284#   6lbs to scheduling goal weight  Recommendations  Pt is an appropriate candidate for surgery  Yes  Pt is aware that she needs to lose 10% of her body weight prior to surgery date  Evaluation / Monitoring  Dietitian to Monitor: Eating pattern as discussed Tolerance of nutrition prescription Body weight Lab values Physical activity Bowel pattern    Pt is still finding it difficult to plan meals instead of grabbing quick things out of the kitchen  She continues to drink coffee with crackers for breakfast   She bought yogurt but has not tried it  She tried the Ensure Max and found it too thick  She is now drinking Premier Protein, also too thick, so dividing into three smaller servings  Suggested mixing with coffee or using a protein powder and then she can add extra fluid to decrease thickness  Trying to be more consistent with meal times    She has been eating fruit for evening snack instead of other sweets such as ice cream, cookies or cake  Pt has not been going to the gym  Decided that buying a treadmill would make it easier to incorporate exercise into her daily routine  She is not currently using the treadmill due to reported knee pain  Suggested that if pt's knee pain continues to prevent her from being mobile she could ask her PCP about physical therapy referral   She is taking an adult multivitamin and mineral, but has not yet started vitamin D  Recommended pt begin 2000 IU Vitamin D3 and 1200-1500mg calcium  Continues to struggle with increasing her fluid intake  Reinforced risk of dehydration post-operatively  Goals  Eliminate sugar sweetened beverages, Food journal, Complete lession plans 1-6, Eat 3 meals per day and Eliminate mindless snacking   Last RD visit goals:  1) Get blood work done and take vitamin D as recommended based on results:  Has not started Vit D  Pt planning on getting bloodwork done week prior to endoscopy  2) Treadmill twice per week 10-15 minutes: not doing due to reports on knee pain  3) Purchase large water bottle - goal is 64-70 ounces per day of calorie free beverages:  Pt did purchase water bottle, getting about 48 oz/day  4) Practice 15/30 rule- stop drinking 15 minutes before, sips with meals and wait 30 minutes after:  Pt reports she forgets to stop drinking before meals and only takes one sip after her meal now  5) Include at least 15 grams of protein at breakfast - try mixing protein with coffee: not done  6) Provided with bariatric pal web site information and coupon code  Site sells sample packets of protein to try before purchasing a large protein tub  Pt plans to purchase sample packs and will hopefully find one that she likes  Pt has not yet bought sample packs  Discussed sample packs available at [facility] as well    7) Eat 3 meals and one planned snack - space meals 4-5 hours apart: not done/in progress   B - 300 calories   S 150-200 calories    L- 500 calories    D 500 calories   Total of 1500 calories per day       This Session:  1) Plan breakfast and lunch  2) increase water to 64 oz/day (2 water bottles)    Time Spent:   30 minutes

## 2022-09-10 DIAGNOSIS — K21.9 CHRONIC GERD: ICD-10-CM

## 2022-09-13 ENCOUNTER — TELEPHONE (OUTPATIENT)
Dept: BARIATRICS | Facility: CLINIC | Age: 47
End: 2022-09-13

## 2022-09-14 RX ORDER — FAMOTIDINE 20 MG/1
TABLET, FILM COATED ORAL
Qty: 180 TABLET | Refills: 1 | Status: SHIPPED | OUTPATIENT
Start: 2022-09-14

## 2022-09-15 ENCOUNTER — APPOINTMENT (OUTPATIENT)
Dept: LAB | Facility: CLINIC | Age: 47
End: 2022-09-15
Payer: COMMERCIAL

## 2022-09-15 DIAGNOSIS — I10 ESSENTIAL HYPERTENSION: ICD-10-CM

## 2022-09-15 DIAGNOSIS — E11.9 TYPE 2 DIABETES MELLITUS WITHOUT COMPLICATION, WITHOUT LONG-TERM CURRENT USE OF INSULIN (HCC): ICD-10-CM

## 2022-09-15 DIAGNOSIS — Z01.818 PREOP TESTING: ICD-10-CM

## 2022-09-15 DIAGNOSIS — E66.01 MORBID (SEVERE) OBESITY DUE TO EXCESS CALORIES (HCC): ICD-10-CM

## 2022-09-15 LAB
ALBUMIN SERPL BCP-MCNC: 2.8 G/DL (ref 3.5–5)
ALP SERPL-CCNC: 65 U/L (ref 46–116)
ALT SERPL W P-5'-P-CCNC: 50 U/L (ref 12–78)
ANION GAP SERPL CALCULATED.3IONS-SCNC: 4 MMOL/L (ref 4–13)
AST SERPL W P-5'-P-CCNC: 34 U/L (ref 5–45)
BASOPHILS # BLD AUTO: 0.02 THOUSANDS/ΜL (ref 0–0.1)
BASOPHILS NFR BLD AUTO: 0 % (ref 0–1)
BILIRUB SERPL-MCNC: 0.49 MG/DL (ref 0.2–1)
BUN SERPL-MCNC: 8 MG/DL (ref 5–25)
CALCIUM ALBUM COR SERPL-MCNC: 9.9 MG/DL (ref 8.3–10.1)
CALCIUM SERPL-MCNC: 8.9 MG/DL (ref 8.3–10.1)
CHLORIDE SERPL-SCNC: 102 MMOL/L (ref 96–108)
CO2 SERPL-SCNC: 30 MMOL/L (ref 21–32)
CREAT SERPL-MCNC: 0.6 MG/DL (ref 0.6–1.3)
EOSINOPHIL # BLD AUTO: 0.12 THOUSAND/ΜL (ref 0–0.61)
EOSINOPHIL NFR BLD AUTO: 1 % (ref 0–6)
ERYTHROCYTE [DISTWIDTH] IN BLOOD BY AUTOMATED COUNT: 14.7 % (ref 11.6–15.1)
GFR SERPL CREATININE-BSD FRML MDRD: 107 ML/MIN/1.73SQ M
GLUCOSE P FAST SERPL-MCNC: 143 MG/DL (ref 65–99)
HCT VFR BLD AUTO: 38.7 % (ref 34.8–46.1)
HGB BLD-MCNC: 11.6 G/DL (ref 11.5–15.4)
IMM GRANULOCYTES # BLD AUTO: 0.02 THOUSAND/UL (ref 0–0.2)
IMM GRANULOCYTES NFR BLD AUTO: 0 % (ref 0–2)
LYMPHOCYTES # BLD AUTO: 2.5 THOUSANDS/ΜL (ref 0.6–4.47)
LYMPHOCYTES NFR BLD AUTO: 29 % (ref 14–44)
MCH RBC QN AUTO: 25.4 PG (ref 26.8–34.3)
MCHC RBC AUTO-ENTMCNC: 30 G/DL (ref 31.4–37.4)
MCV RBC AUTO: 85 FL (ref 82–98)
MONOCYTES # BLD AUTO: 0.51 THOUSAND/ΜL (ref 0.17–1.22)
MONOCYTES NFR BLD AUTO: 6 % (ref 4–12)
NEUTROPHILS # BLD AUTO: 5.39 THOUSANDS/ΜL (ref 1.85–7.62)
NEUTS SEG NFR BLD AUTO: 64 % (ref 43–75)
NRBC BLD AUTO-RTO: 0 /100 WBCS
PLATELET # BLD AUTO: 293 THOUSANDS/UL (ref 149–390)
PMV BLD AUTO: 11.8 FL (ref 8.9–12.7)
POTASSIUM SERPL-SCNC: 3.9 MMOL/L (ref 3.5–5.3)
PROT SERPL-MCNC: 8.4 G/DL (ref 6.4–8.4)
RBC # BLD AUTO: 4.56 MILLION/UL (ref 3.81–5.12)
SODIUM SERPL-SCNC: 136 MMOL/L (ref 135–147)
WBC # BLD AUTO: 8.56 THOUSAND/UL (ref 4.31–10.16)

## 2022-09-15 PROCEDURE — 80053 COMPREHEN METABOLIC PANEL: CPT

## 2022-09-15 PROCEDURE — 36415 COLL VENOUS BLD VENIPUNCTURE: CPT

## 2022-09-15 PROCEDURE — 85025 COMPLETE CBC W/AUTO DIFF WBC: CPT

## 2022-09-19 ENCOUNTER — TELEPHONE (OUTPATIENT)
Dept: BARIATRICS | Facility: CLINIC | Age: 47
End: 2022-09-19

## 2022-09-19 RX ORDER — SODIUM CHLORIDE 9 MG/ML
125 INJECTION, SOLUTION INTRAVENOUS CONTINUOUS
Status: CANCELLED | OUTPATIENT
Start: 2022-09-19

## 2022-09-19 NOTE — TELEPHONE ENCOUNTER
----- Message from Mckenna Shelton sent at 9/15/2022 11:08 AM EDT -----  Please call patient her preop labs are within acceptable limits except for the following:   - Her glucose level is elevated, please make sure she is watching her carbs and sugar intake  - Albumin (type of protein) is low  Please increase her protein intake with  a goal of 60-70 gm per day       Thank you, Christina Felder

## 2022-09-19 NOTE — TELEPHONE ENCOUNTER
Reached out to Pt re: pre-op lab results  Explained results and CRNP instruction  Pt verbalized understanding and was in agreement with plan

## 2022-09-21 ENCOUNTER — HOSPITAL ENCOUNTER (OUTPATIENT)
Dept: GASTROENTEROLOGY | Facility: HOSPITAL | Age: 47
Setting detail: OUTPATIENT SURGERY
Discharge: HOME | End: 2022-09-21
Attending: SURGERY
Payer: COMMERCIAL

## 2022-09-21 ENCOUNTER — ANESTHESIA EVENT (OUTPATIENT)
Dept: GASTROENTEROLOGY | Facility: HOSPITAL | Age: 47
End: 2022-09-21

## 2022-09-21 ENCOUNTER — ANESTHESIA (OUTPATIENT)
Dept: GASTROENTEROLOGY | Facility: HOSPITAL | Age: 47
End: 2022-09-21

## 2022-09-21 VITALS
SYSTOLIC BLOOD PRESSURE: 132 MMHG | HEART RATE: 94 BPM | DIASTOLIC BLOOD PRESSURE: 76 MMHG | RESPIRATION RATE: 16 BRPM | TEMPERATURE: 98.4 F | OXYGEN SATURATION: 97 %

## 2022-09-21 LAB
EXT PREGNANCY TEST URINE: NEGATIVE
EXT. CONTROL: NORMAL

## 2022-09-21 PROCEDURE — 88305 TISSUE EXAM BY PATHOLOGIST: CPT | Performed by: PATHOLOGY

## 2022-09-21 PROCEDURE — 81025 URINE PREGNANCY TEST: CPT | Performed by: ANESTHESIOLOGY

## 2022-09-21 PROCEDURE — 43239 EGD BIOPSY SINGLE/MULTIPLE: CPT | Performed by: SURGERY

## 2022-09-21 RX ORDER — SODIUM CHLORIDE 9 MG/ML
125 INJECTION, SOLUTION INTRAVENOUS CONTINUOUS
Status: DISCONTINUED | OUTPATIENT
Start: 2022-09-21 | End: 2022-09-25 | Stop reason: HOSPADM

## 2022-09-21 RX ORDER — PROPOFOL 10 MG/ML
INJECTION, EMULSION INTRAVENOUS AS NEEDED
Status: DISCONTINUED | OUTPATIENT
Start: 2022-09-21 | End: 2022-09-21

## 2022-09-21 RX ORDER — LIDOCAINE HYDROCHLORIDE 20 MG/ML
INJECTION, SOLUTION EPIDURAL; INFILTRATION; INTRACAUDAL; PERINEURAL AS NEEDED
Status: DISCONTINUED | OUTPATIENT
Start: 2022-09-21 | End: 2022-09-21

## 2022-09-21 RX ADMIN — PROPOFOL 150 MG: 10 INJECTION, EMULSION INTRAVENOUS at 10:18

## 2022-09-21 RX ADMIN — SODIUM CHLORIDE: 0.9 INJECTION, SOLUTION INTRAVENOUS at 10:17

## 2022-09-21 RX ADMIN — PROPOFOL 50 MG: 10 INJECTION, EMULSION INTRAVENOUS at 10:20

## 2022-09-21 RX ADMIN — PROPOFOL 50 MG: 10 INJECTION, EMULSION INTRAVENOUS at 10:19

## 2022-09-21 RX ADMIN — LIDOCAINE HYDROCHLORIDE 100 MG: 20 INJECTION, SOLUTION EPIDURAL; INFILTRATION; INTRACAUDAL at 10:16

## 2022-09-21 RX ADMIN — PROPOFOL 50 MG: 10 INJECTION, EMULSION INTRAVENOUS at 10:22

## 2022-09-21 RX ADMIN — PROPOFOL 50 MG: 10 INJECTION, EMULSION INTRAVENOUS at 10:21

## 2022-09-21 NOTE — ANESTHESIA PREPROCEDURE EVALUATION
Procedure:  EGD    Relevant Problems   CARDIO   (+) Chest pain   (+) Essential hypertension   (+) Heart murmur      ENDO   (+) Type 2 diabetes mellitus without complication, without long-term current use of insulin (HCC)      GI/HEPATIC   (+) Chronic GERD      PULMONARY   (+) ANGE (obstructive sleep apnea)   (+) Viral URI with cough      Other   (+) Class 3 severe obesity without serious comorbidity with body mass index (BMI) of 50 0 to 59 9 in adult [facility])        Physical Exam    Airway    Mallampati score: II  TM Distance: >3 FB  Neck ROM: full     Dental   No notable dental hx     Cardiovascular  Rhythm: regular, Rate: normal, Cardiovascular exam normal    Pulmonary  Pulmonary exam normal Breath sounds clear to auscultation,     Other Findings        Anesthesia Plan  ASA Score- 3     Anesthesia Type- IV sedation with anesthesia with ASA Monitors  Additional Monitors:   Airway Plan:           Plan Factors-    Chart reviewed  Existing labs reviewed  Patient summary reviewed  Patient is not a current smoker  Patient instructed to abstain from smoking on day of procedure  Patient smoked on day of surgery  There is medical exclusion for perioperative obstructive sleep apnea risk education  Induction- intravenous  Postoperative Plan-     Informed Consent- Anesthetic plan and risks discussed with patient

## 2022-09-21 NOTE — H&P
This is a 50 y o  female with a history of morbid obesity and There is no height or weight on file to calculate BMI  Here for an EGD to evaluate the anatomy of the GI tract  Physical Exam    /56   Pulse 100   Temp 98 4 °F (36 9 °C) (Temporal)   Resp 18   SpO2 97%    AAOx3  RRR  CTA B  Abdomen obese  Benign  A/P:    This is a 50 y o  female with a history of morbid obesity and There is no height or weight on file to calculate BMI       Will proceed with the EGD and biopsies        Shruti Ryan MD  09/21/22  10:08 AM

## 2022-09-21 NOTE — ANESTHESIA POSTPROCEDURE EVALUATION
Post-Op Assessment Note    CV Status:  Stable    Pain management: adequate     Mental Status:  Alert and awake   Hydration Status:  Euvolemic   PONV Controlled:  Controlled   Airway Patency:  Patent      Post Op Vitals Reviewed: Yes      Staff: Anesthesiologist         No complications documented      BP      Temp      Pulse     Resp      SpO2      /76   Pulse 94   Temp 98 4 °F (36 9 °C) (Temporal)   Resp 16   SpO2 97%

## 2022-09-22 NOTE — PROGRESS NOTES
Name           Chalo Godinez      12/12 weight check  Last time weight 289 9  Today's weight 287 5    Surgery month:  Aug/Sept  Surgery deadline: Dec  Surgeon: Dr Fede Miguel Follow Up Note:    Preop  Surgery Date: TBD- pt has 12 session program requirement   Today is 11/12 of program requirement     Mental health and wellness - Patient presents to the office today  For weight check and support visit  The patient purchased a watch that reminders her to stand up, use the restroom, eat and drink  Support systems which are her family and her job  Sleep well and stress- manageable  Reviewed workflow and weight loss to achieve her goal for surgery  Eating behaviors - protein drink and will divide it throughout the day as she feels it a heavy drink  Two meals a day and snacks in between  (Nuts, Fruits, Popsicles)  Hydration-coffee and sugar free (splenda) and (creamer)  Last meal 6-6:30p m  practicing the 30/60 rule  Activity -  Watch is helping her stay active as it reminders her to stand up and move more then she did in the past      Progress toward program requirements    Workflow:  · Psych and/or D+A Clearance: N/A  · PCP Letter: done 6/8/2022  · Support Group: done 8/10/22  · Surgeon Appt done 6/22/2022   · EGD  9/21/2022  · Cardiac Risk Assessment done 7/5/2022   · Sleep Studies done 5/17/2022   · Blood work done 9/15/22  · Nicotine test n/a   · :     Reviewed and discussed  Patient educated and handouts provided    Adequate hydration  Exercise  Meal planning and preparation  Lifestyle changes  Possible problems with poor eating habits  Techniques for self monitoring and keeping daily food journal    Next appointment: 9 30 22 Final Assessment with Dr Crystal Gray

## 2022-09-23 ENCOUNTER — OFFICE VISIT (OUTPATIENT)
Dept: BARIATRICS | Facility: CLINIC | Age: 47
End: 2022-09-23

## 2022-09-23 VITALS — BODY MASS INDEX: 53.44 KG/M2 | WEIGHT: 287.5 LBS

## 2022-09-23 DIAGNOSIS — E66.01 MORBID (SEVERE) OBESITY DUE TO EXCESS CALORIES (HCC): Primary | ICD-10-CM

## 2022-09-23 PROCEDURE — RECHECK

## 2022-09-30 ENCOUNTER — OFFICE VISIT (OUTPATIENT)
Dept: BARIATRICS | Facility: CLINIC | Age: 47
End: 2022-09-30
Payer: COMMERCIAL

## 2022-09-30 VITALS
SYSTOLIC BLOOD PRESSURE: 124 MMHG | HEART RATE: 102 BPM | WEIGHT: 284.5 LBS | DIASTOLIC BLOOD PRESSURE: 86 MMHG | OXYGEN SATURATION: 98 % | BODY MASS INDEX: 52.36 KG/M2 | HEIGHT: 62 IN | TEMPERATURE: 98.1 F

## 2022-09-30 DIAGNOSIS — R53.82 CHRONIC FATIGUE: ICD-10-CM

## 2022-09-30 DIAGNOSIS — E11.9 TYPE 2 DIABETES MELLITUS WITHOUT COMPLICATION, WITHOUT LONG-TERM CURRENT USE OF INSULIN (HCC): ICD-10-CM

## 2022-09-30 DIAGNOSIS — K21.9 CHRONIC GERD: ICD-10-CM

## 2022-09-30 DIAGNOSIS — I10 ESSENTIAL HYPERTENSION: ICD-10-CM

## 2022-09-30 DIAGNOSIS — E66.01 CLASS 3 SEVERE OBESITY DUE TO EXCESS CALORIES WITHOUT SERIOUS COMORBIDITY WITH BODY MASS INDEX (BMI) OF 50.0 TO 59.9 IN ADULT (HCC): Primary | ICD-10-CM

## 2022-09-30 DIAGNOSIS — G47.33 OSA (OBSTRUCTIVE SLEEP APNEA): ICD-10-CM

## 2022-09-30 PROCEDURE — 3074F SYST BP LT 130 MM HG: CPT | Performed by: SURGERY

## 2022-09-30 PROCEDURE — 99213 OFFICE O/P EST LOW 20 MIN: CPT | Performed by: SURGERY

## 2022-09-30 PROCEDURE — 3079F DIAST BP 80-89 MM HG: CPT | Performed by: SURGERY

## 2022-09-30 NOTE — PROGRESS NOTES
FOLLOW UP VISIT - BARIATRIC SURGERY  Amy Welch 50 y o  female MRN: [id number]  Unit/Bed#:  Encounter: [id number]      HPI:  Amy Welch is a 50 y o  female who presents with a long standing history of morbid obesity in the process of undergoing a bariatric procedure  Review of Systems   Gastrointestinal:        Intermittent heartburn on PPI   All other systems reviewed and are negative  Historical Information   Past Medical History:   Diagnosis Date    Pre-diabetes      Past Surgical History:   Procedure Laterality Date     SECTION  10/04/2005    TUBAL LIGATION       Social History   Social History     Substance and Sexual Activity   Alcohol Use Never     Social History     Substance and Sexual Activity   Drug Use Never     Social History     Tobacco Use   Smoking Status Never Smoker   Smokeless Tobacco Never Used     Family History: non-contributory    Meds/Allergies   all medications and allergies reviewed  No Known Allergies    Objective       Current Vitals:   Blood Pressure: 124/86 (22 1002)  Pulse: 102 (22 1002)  Temperature: 98 1 °F (36 7 °C) (22 1002)  Temp Source: Tympanic (22)  Height: 5' 1 5" (156 2 cm) (22)  Weight - Scale: 129 kg (284 lb 8 oz) (22 1002)  SpO2: 98 % (22)        Invasive Devices  Report    None                 Physical Exam  Vitals reviewed  Constitutional:       General: She is not in acute distress  Appearance: She is well-developed  She is not diaphoretic  HENT:      Head: Normocephalic and atraumatic  Right Ear: External ear normal       Left Ear: External ear normal       Nose: Nose normal    Eyes:      General: No scleral icterus  Right eye: No discharge  Left eye: No discharge  Conjunctiva/sclera: Conjunctivae normal    Neurological:      Mental Status: She is alert and oriented to person, place, and time     Psychiatric:         Behavior: Behavior normal  Thought Content: Thought content normal          Judgment: Judgment normal          Lab Results: I have personally reviewed pertinent lab results  Imaging: I have personally reviewed pertinent reports  EKG, Pathology, and Other Studies: I have personally reviewed pertinent reports  The EGD from 7/15/2022  Gastritis  Small hiatal hernia most likely sliding type  Final Diagnosis  A  Stomach, biopsy:  - Fragments of gastric antral and oxyntic mucosa with mild chronic nonspecific inflammation   - No Helicobacter pylori organisms are identified with routine H&E stain  Assessment/PLAN:    50 y o  female with a longstanding history morbid obesity and inability to sustain any meaningful weight loss on her own despite several attempts  She is in the process of undergoing a bariatric operation here within our medical weight management program   I think she is an excellent candidate to undergo a bariatric operation  She has decided to undergo a laparoscopic Merry-en-Y gastric bypass  I had a detailed discussion with her stressing the fact that long-term success is largely dependent on compliance and abidance to the recommendations of the program as well as participation within the support groups  She is committed to continue to observe her diet and to increase her physical activity  She will follow up with us as scheduled  She has 15 more lb lose prior to the surgery        Harrison Roberts  9/30/2022  10:14 AM

## 2022-10-11 PROBLEM — Z00.00 HEALTHCARE MAINTENANCE: Status: RESOLVED | Noted: 2022-06-14 | Resolved: 2022-10-11

## 2022-11-10 ENCOUNTER — OFFICE VISIT (OUTPATIENT)
Dept: BARIATRICS | Facility: CLINIC | Age: 47
End: 2022-11-10

## 2022-11-10 ENCOUNTER — CLINICAL SUPPORT (OUTPATIENT)
Dept: BARIATRICS | Facility: CLINIC | Age: 47
End: 2022-11-10

## 2022-11-10 VITALS
HEIGHT: 62 IN | SYSTOLIC BLOOD PRESSURE: 120 MMHG | WEIGHT: 280 LBS | HEART RATE: 101 BPM | TEMPERATURE: 99.3 F | DIASTOLIC BLOOD PRESSURE: 80 MMHG | BODY MASS INDEX: 51.53 KG/M2

## 2022-11-10 DIAGNOSIS — G47.33 OSA (OBSTRUCTIVE SLEEP APNEA): ICD-10-CM

## 2022-11-10 DIAGNOSIS — E11.9 TYPE 2 DIABETES MELLITUS WITHOUT COMPLICATION, WITHOUT LONG-TERM CURRENT USE OF INSULIN (HCC): ICD-10-CM

## 2022-11-10 DIAGNOSIS — I10 ESSENTIAL HYPERTENSION: ICD-10-CM

## 2022-11-10 DIAGNOSIS — K21.9 CHRONIC GERD: ICD-10-CM

## 2022-11-10 DIAGNOSIS — M79.89 LEG SWELLING: ICD-10-CM

## 2022-11-10 DIAGNOSIS — E66.01 CLASS 3 SEVERE OBESITY DUE TO EXCESS CALORIES WITHOUT SERIOUS COMORBIDITY WITH BODY MASS INDEX (BMI) OF 50.0 TO 59.9 IN ADULT (HCC): Primary | ICD-10-CM

## 2022-11-10 DIAGNOSIS — R53.82 CHRONIC FATIGUE: ICD-10-CM

## 2022-11-10 RX ORDER — HEPARIN SODIUM 5000 [USP'U]/ML
5000 INJECTION, SOLUTION INTRAVENOUS; SUBCUTANEOUS
OUTPATIENT
Start: 2022-11-29 | End: 2022-11-30

## 2022-11-10 RX ORDER — IBUPROFEN 200 MG
1 CAPSULE ORAL DAILY
COMMUNITY

## 2022-11-10 RX ORDER — ACETAMINOPHEN 325 MG/1
975 TABLET ORAL ONCE
OUTPATIENT
Start: 2022-11-28 | End: 2022-11-10

## 2022-11-10 RX ORDER — METRONIDAZOLE 500 MG/100ML
500 INJECTION, SOLUTION INTRAVENOUS ONCE
OUTPATIENT
Start: 2022-11-28 | End: 2022-11-10

## 2022-11-10 RX ORDER — CELECOXIB 200 MG/1
200 CAPSULE ORAL ONCE
OUTPATIENT
Start: 2022-11-28 | End: 2022-11-10

## 2022-11-10 RX ORDER — SCOLOPAMINE TRANSDERMAL SYSTEM 1 MG/1
1 PATCH, EXTENDED RELEASE TRANSDERMAL ONCE
OUTPATIENT
Start: 2022-11-28 | End: 2022-11-10

## 2022-11-10 RX ORDER — GABAPENTIN 300 MG/1
300 CAPSULE ORAL ONCE
OUTPATIENT
Start: 2022-11-28 | End: 2022-11-10

## 2022-11-10 NOTE — H&P (VIEW-ONLY)
BARIATRIC H&P - BARIATRIC SURGERY  Parish Merida 50 y o  female MRN: [id number]  Unit/Bed#:  Encounter: [id number]      HPI:  Parish Merida is a 50 y o  female who presents with a long-standing history of morbid obesity  She was found to be a good candidate to undergo a bariatric operation [facility]  She is here today to discuss details of her surgery  Review of Systems   All other systems reviewed and are negative  Historical Information   Past Medical History:   Diagnosis Date   • Pre-diabetes      Past Surgical History:   Procedure Laterality Date   •  SECTION  10/04/2005   • TUBAL LIGATION       Social History   Social History     Substance and Sexual Activity   Alcohol Use Never     Social History     Substance and Sexual Activity   Drug Use Never     Social History     Tobacco Use   Smoking Status Never Smoker   Smokeless Tobacco Never Used     Family History: non-contributory    Meds/Allergies   all medications and allergies reviewed  No Known Allergies    Objective     Current Vitals:   Blood Pressure: 120/80 (11/10/22 141)  Pulse: 101 (11/10/22 141)  Temperature: 99 3 °F (37 4 °C) (11/10/22 1415)  Temp Source: Tympanic (11/10/22 1415)  Height: 5' 1 5" (156 2 cm) (11/10/22 1415)  Weight - Scale: 127 kg (280 lb) (11/10/22 1415)      Invasive Devices  Report    None                 Physical Exam  Vitals and nursing note reviewed  Constitutional:       General: She is not in acute distress  Appearance: Normal appearance  She is well-developed  She is not diaphoretic  HENT:      Head: Normocephalic and atraumatic  Nose: Nose normal    Eyes:      General: No scleral icterus  Right eye: No discharge  Left eye: No discharge  Conjunctiva/sclera: Conjunctivae normal    Cardiovascular:      Rate and Rhythm: Normal rate and regular rhythm  Heart sounds: Normal heart sounds     Pulmonary:      Effort: Pulmonary effort is normal  No respiratory distress  Breath sounds: Normal breath sounds  No stridor  No wheezing or rales  Chest:      Chest wall: No tenderness  Abdominal:      General: Bowel sounds are normal       Palpations: Abdomen is soft  Tenderness: There is no abdominal tenderness  There is no guarding or rebound  Comments: Abdomen is obese, soft and benign  Well-healed Pfannenstiel incision from previous    Musculoskeletal:         General: No deformity  Normal range of motion  Cervical back: Normal range of motion and neck supple  Lymphadenopathy:      Cervical: No cervical adenopathy  Skin:     General: Skin is warm and dry  Findings: No erythema or rash  Neurological:      Mental Status: She is alert and oriented to person, place, and time  Psychiatric:         Behavior: Behavior normal          Thought Content: Thought content normal          Judgment: Judgment normal          Lab Results: I have personally reviewed pertinent lab results  Imaging: I have personally reviewed pertinent reports  EKG, Pathology, and Other Studies: I have personally reviewed pertinent reports  The endoscopy showed gastritis and small hiatal hernia most likely sliding type  The biopsies revealed  Final Diagnosis   A  Stomach, biopsy:  - Fragments of gastric antral and oxyntic mucosa with mild chronic nonspecific inflammation   - No Helicobacter pylori organisms are identified with routine H&E stain  Assessment/PLAN:    50 y o  female morbidly obese found to be a good candidate to undergo a weight loss operation upon being enrolled here at the [facility]     Patient has a long history of morbid obesity and is presenting to discuss the surgical weight loss options  Despite the patient best efforts patient was unable to lose any meaningful or sustainable weight using nonsurgical means  We had a long discussion regarding all the surgical weight-loss options at our disposal at this point and reviewed the risks and benefits of each procedure in details as it relates to her age, BMI and medical conditions  She has been pre certified to undergo a Laparoscopic Merry-en-Y gastric bypass possible sleeve gastrectomy  Here today to review her pre op test results  Has been medically cleared for the procedure     +++++++++++++++++++++++++++++++  Patient has ANGE and wears a CPAP machine   +++++++++++++++++++++++++++++++    I have discussed with her at length the risks and benefits of the operation and reiterated the components of our multidisciplinary program and the importance of compliance and follow up in the post operative period  Although there is a great statistical chance of improvement or even resolution of most of her associated comorbidities, the results vary from patient to patient and they largely depend on her commitment  The patient was also instructed with regards to the importance of behavior modification, nutritional counseling, support meeting attendance and lifestyle changes that are important to ensure success  She was given the opportunity to ask questions and I have answered all of them  I have addressed with the patient the level of CODE STATUS for this hospital stay and after explaining the different options currently she wishes to be a Level I  She understands and wishes to proceed      She still has 10 more lb lose prior to the surgery    Foster Triana MD  11/10/2022  2:22 PM

## 2022-11-10 NOTE — PROGRESS NOTES
Pt attended pre-op education session  Standardized packet of information for bariatric surgery was sent via email and was reviewed with pt  Importance of lifestyle change and development of regular exercise routine stressed  Pt given the opportunity to ask questions  Ensure pre-surgery drink instructions were given  Questions were answered  Pt verbalized understanding of all information provided  Pt appeared prepared for upcoming surgery  Pt  educated on two-week pre operative liver shrinking diet  Pt understands that the diet needs to be followed for 2 weeks prior to surgery  Handout reviewed  Emphasized the need to drink 80 ounces of fluid per day while on the diet  Reviewed pre-op ERAS drink, post-operative clear liquid, full liquid, and pureed diet, post-operative nutrition rules and facts, and post-operative bariatric multivitamin/mineral recommendations and brand comparison  Contact information provided for any questions/concerns  Patient was able to verbalize basic diet (protein, fluid, vitamin and mineral) recommendations and possible nutrition-related complications   Yes

## 2022-11-10 NOTE — H&P
BARIATRIC H&P - BARIATRIC SURGERY  Catha Dakins 50 y o  female MRN: [id number]  Unit/Bed#:  Encounter: [id number]      HPI:  Catha Dakins is a 50 y o  female who presents with a long-standing history of morbid obesity  She was found to be a good candidate to undergo a bariatric operation upon [facility]  She is here today to discuss details of her surgery  Review of Systems   All other systems reviewed and are negative  Historical Information   Past Medical History:   Diagnosis Date   • Pre-diabetes      Past Surgical History:   Procedure Laterality Date   •  SECTION  10/04/2005   • TUBAL LIGATION       Social History   Social History     Substance and Sexual Activity   Alcohol Use Never     Social History     Substance and Sexual Activity   Drug Use Never     Social History     Tobacco Use   Smoking Status Never Smoker   Smokeless Tobacco Never Used     Family History: non-contributory    Meds/Allergies   all medications and allergies reviewed  No Known Allergies    Objective     Current Vitals:   Blood Pressure: 120/80 (11/10/22 141)  Pulse: 101 (11/10/22 141)  Temperature: 99 3 °F (37 4 °C) (11/10/22 1415)  Temp Source: Tympanic (11/10/22 141)  Height: 5' 1 5" (156 2 cm) (11/10/22 1415)  Weight - Scale: 127 kg (280 lb) (11/10/22 1415)      Invasive Devices  Report    None                 Physical Exam  Vitals and nursing note reviewed  Constitutional:       General: She is not in acute distress  Appearance: Normal appearance  She is well-developed  She is not diaphoretic  HENT:      Head: Normocephalic and atraumatic  Nose: Nose normal    Eyes:      General: No scleral icterus  Right eye: No discharge  Left eye: No discharge  Conjunctiva/sclera: Conjunctivae normal    Cardiovascular:      Rate and Rhythm: Normal rate and regular rhythm  Heart sounds: Normal heart sounds     Pulmonary:      Effort: Pulmonary effort is normal  No respiratory distress  Breath sounds: Normal breath sounds  No stridor  No wheezing or rales  Chest:      Chest wall: No tenderness  Abdominal:      General: Bowel sounds are normal       Palpations: Abdomen is soft  Tenderness: There is no abdominal tenderness  There is no guarding or rebound  Comments: Abdomen is obese, soft and benign  Well-healed Pfannenstiel incision from previous    Musculoskeletal:         General: No deformity  Normal range of motion  Cervical back: Normal range of motion and neck supple  Lymphadenopathy:      Cervical: No cervical adenopathy  Skin:     General: Skin is warm and dry  Findings: No erythema or rash  Neurological:      Mental Status: She is alert and oriented to person, place, and time  Psychiatric:         Behavior: Behavior normal          Thought Content: Thought content normal          Judgment: Judgment normal          Lab Results: I have personally reviewed pertinent lab results  Imaging: I have personally reviewed pertinent reports  EKG, Pathology, and Other Studies: I have personally reviewed pertinent reports  The endoscopy showed gastritis and small hiatal hernia most likely sliding type  The biopsies revealed  Final Diagnosis   A  Stomach, biopsy:  - Fragments of gastric antral and oxyntic mucosa with mild chronic nonspecific inflammation   - No Helicobacter pylori organisms are identified with routine H&E stain  Assessment/PLAN:    50 y o  female morbidly obese found to be a good candidate to undergo a weight loss operation upon being enrolled here at the [facility]     Patient has a long history of morbid obesity and is presenting to discuss the surgical weight loss options  Despite the patient best efforts patient was unable to lose any meaningful or sustainable weight using nonsurgical means  We had a long discussion regarding all the surgical weight-loss options at our disposal at this point and reviewed the risks and benefits of each procedure in details as it relates to her age, BMI and medical conditions  She has been pre certified to undergo a Laparoscopic Merry-en-Y gastric bypass possible sleeve gastrectomy  Here today to review her pre op test results  Has been medically cleared for the procedure     +++++++++++++++++++++++++++++++  Patient has ANGE and wears a CPAP machine   +++++++++++++++++++++++++++++++    I have discussed with her at length the risks and benefits of the operation and reiterated the components of our multidisciplinary program and the importance of compliance and follow up in the post operative period  Although there is a great statistical chance of improvement or even resolution of most of her associated comorbidities, the results vary from patient to patient and they largely depend on her commitment  The patient was also instructed with regards to the importance of behavior modification, nutritional counseling, support meeting attendance and lifestyle changes that are important to ensure success  She was given the opportunity to ask questions and I have answered all of them  I have addressed with the patient the level of CODE STATUS for this hospital stay and after explaining the different options currently she wishes to be a Level I  She understands and wishes to proceed      She still has 10 more lb lose prior to the surgery    Giovanni Lora MD  11/10/2022  2:22 PM

## 2022-11-14 DIAGNOSIS — E66.01 MORBID OBESITY (HCC): Primary | ICD-10-CM

## 2022-11-14 NOTE — TELEPHONE ENCOUNTER
PO meds for laparoscopic Merry-En-Y gastric bypass surgery (possible sleeve gastrectomy) on 11/28/2022 with Dr Sheldon Meza

## 2022-11-15 DIAGNOSIS — E11.9 TYPE 2 DIABETES MELLITUS WITHOUT COMPLICATION, WITHOUT LONG-TERM CURRENT USE OF INSULIN (HCC): ICD-10-CM

## 2022-11-15 RX ORDER — MELATONIN
1000 DAILY
COMMUNITY
End: 2022-12-05

## 2022-11-15 RX ORDER — PEN NEEDLE, DIABETIC 30 GX3/16"
NEEDLE, DISPOSABLE MISCELLANEOUS DAILY
Qty: 100 EACH | Refills: 2 | Status: SHIPPED | OUTPATIENT
Start: 2022-11-15

## 2022-11-15 NOTE — PRE-PROCEDURE INSTRUCTIONS
Pre-Surgery Instructions:   Medication Instructions   • calcium citrate (CALCITRATE) 950 (200 Ca) MG tablet Stop taking 7 days prior to surgery  • cetirizine (ZyrTEC) 10 mg tablet Uses PRN- OK to take day of surgery   • cholecalciferol (VITAMIN D3) 1,000 units tablet Stop taking 7 days prior to surgery  • famotidine (PEPCID) 20 mg tablet Take day of surgery  • fluticasone (FLONASE) 50 mcg/act nasal spray Uses PRN- OK to take day of surgery   • lisinopril-hydrochlorothiazide (PRINZIDE,ZESTORETIC) 10-12 5 MG per tablet Hold day of surgery  • metFORMIN (GLUCOPHAGE) 500 mg tablet Hold day of surgery  • Multiple Vitamins-Minerals (Womens Multi) CAPS Stop taking 7 days prior to surgery  • Victoza injection Take night before surgery   You will receive a phone call from hospital for arrival time day before the procedure between 2-8pm  Please call surgeons office if any changes in your condition  Wear easy on/off clothing; consider type of surgery  Valuables, jewelry, piercings please keep at home  No contact lenses  Reviewed COVID protocol and masking policy  Reviewed NPO after MN except medications the morning of with a small sip of water  Pt has ERAS drinks, reviewed instructions for them - pt verbalized understanding  Follow pre surgery showering or cleaning instructions as  Reviewed by nurse or surgeons office      Questions answered and concerns addressed

## 2022-11-18 ENCOUNTER — OFFICE VISIT (OUTPATIENT)
Dept: FAMILY MEDICINE CLINIC | Facility: CLINIC | Age: 47
End: 2022-11-18

## 2022-11-18 VITALS
BODY MASS INDEX: 50.05 KG/M2 | DIASTOLIC BLOOD PRESSURE: 71 MMHG | RESPIRATION RATE: 20 BRPM | WEIGHT: 272 LBS | HEART RATE: 100 BPM | HEIGHT: 62 IN | TEMPERATURE: 97.9 F | SYSTOLIC BLOOD PRESSURE: 101 MMHG | OXYGEN SATURATION: 99 %

## 2022-11-18 DIAGNOSIS — I10 ESSENTIAL HYPERTENSION: ICD-10-CM

## 2022-11-18 DIAGNOSIS — J30.9 ALLERGIC RHINITIS, UNSPECIFIED SEASONALITY, UNSPECIFIED TRIGGER: ICD-10-CM

## 2022-11-18 DIAGNOSIS — E11.9 TYPE 2 DIABETES MELLITUS WITHOUT COMPLICATION, WITHOUT LONG-TERM CURRENT USE OF INSULIN (HCC): Primary | ICD-10-CM

## 2022-11-18 LAB — SL AMB POCT HEMOGLOBIN AIC: 6.1 (ref ?–6.5)

## 2022-11-18 RX ORDER — LISINOPRIL 10 MG/1
10 TABLET ORAL DAILY
Qty: 30 TABLET | Refills: 2 | Status: SHIPPED | OUTPATIENT
Start: 2022-11-18

## 2022-11-18 NOTE — PROGRESS NOTES
Name: Blythe Lesch      : 1975      MRN: [id number]  Encounter Provider: Ambrosio Aguilera DO  Encounter Date: 2022   Encounter department: [facility] [address] [place] [address]    Assessment & Plan     1  Type 2 diabetes mellitus without complication, without long-term current use of insulin ([facility]  Assessment & Plan:  Controlled  HA1C 6 1 today, 22  Prior HgbA1C 6 8 in 2022  Home regimen: Metformin 500 mg BID and Victoza daily  Patient states she is compliant w/ medication   - Fundoscopy: Performed in  from outside [place] [id number]  Defer to next visit on 22  - Urine MicroAlb:Cr wnl 21 Defer to next visit on 22  - Diabetic foot exam: normal 21 Defer to next visit on   - Complications: none  - Weight is reduced at 123Kg  Pt following up with Bariatric surgery  Surgery planned for   Plan:  - In setting of decreased A1C, d/c Victoza and continue metformin 500 mg BID  Advised to check blood sugar once daily especially after bariatric surgery when sugars may be lower  If sugars are too low (<100), stop metformin  F/u on 22      Lab Results   Component Value Date/Time    HGBA1C 6 1 2022 04:38 PM    GLUC 174 02/15/2019 10:03 AM    BUN 8 09/15/2022 07:32 AM    CREATININE 0 60 09/15/2022 07:32 AM    CHOLESTEROL 167 2022 10:13 AM    TRIG 93 2022 10:13 AM    HDL 40 (L) 2022 10:13 AM    LDLCALC 108 (H) 2022 10:13 AM    CREATININEUR 72 8 2021 01:39 PM    LABMICR 8 2 2021 01:39 PM    MICROALBCRE 11 2021 01:39 PM         Orders:  -     POCT hemoglobin A1c  -     Glucometer  -     Glucometer test strips    2  Allergic rhinitis, unspecified seasonality, unspecified trigger    3  Essential hypertension  Assessment & Plan:  BP well controlled 101/71  Lower during recent visits likely due to weight loss as pt prepares for Bariatric surgery on    Due to continued weight loss after the surgery, will decrease BP regimen  - Stop combination lisinopril-HCTZ 10-12 5 mg and start only lisinopril 10 mg daily  - f/u in 1 week for BP recheck     Orders:  -     lisinopril (ZESTRIL) 10 mg tablet; Take 1 tablet (10 mg total) by mouth daily           Subjective     HPI   Pt presents to clinic to ask if she should change any of her medications with her bariatric surgery coming up on   No complaints today  Review of Systems   Constitutional: Negative for activity change, chills, fatigue, fever and unexpected weight change  HENT: Negative for ear pain and sore throat  Eyes: Negative for pain, redness and visual disturbance  Respiratory: Negative for cough and shortness of breath  Cardiovascular: Negative for chest pain and palpitations  Gastrointestinal: Negative for abdominal pain, constipation, diarrhea, nausea and vomiting  Neurological: Negative for dizziness  All other systems reviewed and are negative        Past Medical History:   Diagnosis Date   • CPAP (continuous positive airway pressure) dependence    • Diabetes mellitus (HCC)     Type 2   • GERD (gastroesophageal reflux disease)    • Heart murmur    • Hypertension    • Seasonal allergies    • Sleep apnea      Past Surgical History:   Procedure Laterality Date   •  SECTION  10/04/2005   • TUBAL LIGATION       Family History   Problem Relation Age of Onset   • Hypertension Mother    • Diabetes Mother    • Hypertension Father    • Diabetes Father    • Cancer Maternal Grandmother    • Diabetes Maternal Grandfather      Social History     Socioeconomic History   • Marital status: Single     Spouse name: None   • Number of children: 3   • Years of education: None   • Highest education level: None   Occupational History   • Occupation: Behavioral health field   Tobacco Use   • Smoking status: Never   • Smokeless tobacco: Never   Vaping Use   • Vaping Use: Never used   Substance and Sexual Activity   • Alcohol use: Never   • Drug use: Never   • Sexual activity: Not Currently     Birth control/protection: Female Sterilization   Other Topics Concern   • None   Social History Narrative    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     Social Determinants of Health     Financial Resource Strain: Low Risk    • Difficulty of Paying Living Expenses: Not hard at all   Food Insecurity: No Food Insecurity   • Worried About [address] in the Last Year: Never true   • Ran Out of Food in the Last Year: Never true   Transportation Needs: No Transportation Needs   • Lack of Transportation (Medical): No   • Lack of Transportation (Non-Medical):  No   Physical Activity: Not on file   Stress: No Stress Concern Present   • Feeling of Stress : Not at all   Social Connections: Not on file   Intimate Partner Violence: Not At Risk   • Fear of Current or Ex-Partner: No   • Emotionally Abused: No   • Physically Abused: No   • Sexually Abused: No   Housing Stability: Low Risk    • Unable to Pay for Housing in the Last Year: No   • Number of Places Lived in the Last Year: 1   • Unstable Housing in the Last Year: No     Current Outpatient Medications on File Prior to Visit   Medication Sig   • calcium citrate (CALCITRATE) 950 (200 Ca) MG tablet Take 1 tablet by mouth daily   • cetirizine (ZyrTEC) 10 mg tablet Take 1 tablet (10 mg total) by mouth daily as needed for allergies or rhinitis   • cholecalciferol (VITAMIN D3) 1,000 units tablet Take 1,000 Units by mouth daily   • famotidine (PEPCID) 20 mg tablet TAKE 1 TABLET BY MOUTH TWICE A DAY   • Insulin Pen Needle (Pen Needles) 31G X 5 MM MISC Use in the morning   • metFORMIN (GLUCOPHAGE) 500 mg tablet Take 1 tablet (500 mg total) by mouth 2 (two) times a day with meals   • Multiple Vitamins-Minerals (Womens Multi) CAPS Take by mouth     No Known Allergies  Immunization History   Administered Date(s) Administered   • COVID-19 PFIZER VACCINE 0 3 ML IM 09/26/2021, 10/17/2021   • Influenza, injectable, quadrivalent, preservative free 0 5 mL 10/21/2020       Objective     /71 (BP Location: Left arm, Patient Position: Sitting, Cuff Size: Large)   Pulse 100   Temp 97 9 °F (36 6 °C) (Temporal)   Resp 20   Ht 5' 1 5" (1 562 m)   Wt 123 kg (272 lb)   SpO2 99%   BMI 50 56 kg/m²     Physical Exam  Constitutional:       General: She is not in acute distress  Appearance: She is not ill-appearing or toxic-appearing  HENT:      Head: Normocephalic and atraumatic  Right Ear: External ear normal       Left Ear: External ear normal    Eyes:      General: No scleral icterus  Right eye: No discharge  Left eye: No discharge  Extraocular Movements: Extraocular movements intact  Conjunctiva/sclera: Conjunctivae normal    Cardiovascular:      Rate and Rhythm: Normal rate and regular rhythm  Heart sounds: Normal heart sounds  Pulmonary:      Effort: Pulmonary effort is normal  No respiratory distress  Breath sounds: Normal breath sounds  Skin:     General: Skin is warm and dry  Neurological:      General: No focal deficit present  Mental Status: She is alert  Motor: No weakness  Gait: Gait normal    Psychiatric:         Mood and Affect: Mood normal          Behavior: Behavior normal          Thought Content:  Thought content normal          Judgment: Judgment normal        Brain Puentes,

## 2022-11-19 NOTE — ASSESSMENT & PLAN NOTE
BP well controlled 101/71  Lower during recent visits likely due to weight loss as pt prepares for Bariatric surgery on 11/28  Due to continued weight loss after the surgery, will decrease BP regimen     - Stop combination lisinopril-HCTZ 10-12 5 mg and start only lisinopril 10 mg daily  - f/u in 1 week for BP recheck

## 2022-11-19 NOTE — ASSESSMENT & PLAN NOTE
Controlled  HA1C 6 1 today, 11/19/22  Prior HgbA1C 6 8 in 7/2022  Home regimen: Metformin 500 mg BID and Victoza daily  Patient states she is compliant w/ medication   - Fundoscopy: Performed in 2021 from outside [facility]  Defer to next visit on 12/05/22  - Urine MicroAlb:Cr wnl 12/22/21 Defer to next visit on 12/05/22  - Diabetic foot exam: normal 12/22/21 Defer to next visit on 40/14/64  - Complications: none  - Weight is reduced at 123Kg  Pt following up with Bariatric surgery  Surgery planned for 11/28  Plan:  - In setting of decreased A1C, d/c Victoza and continue metformin 500 mg BID  Advised to check blood sugar once daily especially after bariatric surgery when sugars may be lower  If sugars are too low (<100), stop metformin    F/u on 12/05/22      Lab Results   Component Value Date/Time    HGBA1C 6 1 11/18/2022 04:38 PM    GLUC 174 02/15/2019 10:03 AM    BUN 8 09/15/2022 07:32 AM    CREATININE 0 60 09/15/2022 07:32 AM    CHOLESTEROL 167 03/19/2022 10:13 AM    TRIG 93 03/19/2022 10:13 AM    HDL 40 (L) 03/19/2022 10:13 AM    LDLCALC 108 (H) 03/19/2022 10:13 AM    CREATININEUR 72 8 12/22/2021 01:39 PM    LABMICR 8 2 12/22/2021 01:39 PM    MICROALBCRE 11 12/22/2021 01:39 PM

## 2022-11-21 RX ORDER — OMEPRAZOLE 20 MG/1
20 CAPSULE, DELAYED RELEASE ORAL DAILY
Qty: 30 CAPSULE | Refills: 3 | Status: SHIPPED | OUTPATIENT
Start: 2022-11-29

## 2022-11-21 RX ORDER — OXYCODONE HYDROCHLORIDE 5 MG/1
5 TABLET ORAL EVERY 4 HOURS PRN
Qty: 10 TABLET | Refills: 0 | Status: SHIPPED | OUTPATIENT
Start: 2022-11-29

## 2022-11-22 ENCOUNTER — TELEPHONE (OUTPATIENT)
Dept: BARIATRICS | Facility: CLINIC | Age: 47
End: 2022-11-22

## 2022-11-25 ENCOUNTER — ANESTHESIA EVENT (INPATIENT)
Dept: PERIOP | Facility: HOSPITAL | Age: 47
End: 2022-11-25

## 2022-11-28 ENCOUNTER — HOSPITAL ENCOUNTER (INPATIENT)
Facility: HOSPITAL | Age: 47
LOS: 1 days | Discharge: HOME | End: 2022-11-29
Attending: SURGERY | Admitting: SURGERY

## 2022-11-28 ENCOUNTER — ANESTHESIA (INPATIENT)
Dept: PERIOP | Facility: HOSPITAL | Age: 47
End: 2022-11-28

## 2022-11-28 DIAGNOSIS — I10 ESSENTIAL HYPERTENSION: ICD-10-CM

## 2022-11-28 DIAGNOSIS — E66.01 MORBID OBESITY WITH BMI OF 45.0-49.9, ADULT (HCC): Primary | ICD-10-CM

## 2022-11-28 DIAGNOSIS — E11.9 TYPE 2 DIABETES MELLITUS WITHOUT COMPLICATION, WITHOUT LONG-TERM CURRENT USE OF INSULIN (HCC): ICD-10-CM

## 2022-11-28 PROBLEM — E66.813 CLASS 3 SEVERE OBESITY WITHOUT SERIOUS COMORBIDITY WITH BODY MASS INDEX (BMI) OF 50.0 TO 59.9 IN ADULT (HCC): Status: RESOLVED | Noted: 2019-02-16 | Resolved: 2022-11-28

## 2022-11-28 LAB
EXT PREGNANCY TEST URINE: NEGATIVE
EXT. CONTROL: NORMAL
GLUCOSE SERPL-MCNC: 166 MG/DL (ref 65–140)
GLUCOSE SERPL-MCNC: 193 MG/DL (ref 65–140)
GLUCOSE SERPL-MCNC: 204 MG/DL (ref 65–140)

## 2022-11-28 PROCEDURE — 0D164ZA BYPASS STOMACH TO JEJUNUM, PERCUTANEOUS ENDOSCOPIC APPROACH: ICD-10-PCS | Performed by: SURGERY

## 2022-11-28 PROCEDURE — 0DJ08ZZ INSPECTION OF UPPER INTESTINAL TRACT, VIA NATURAL OR ARTIFICIAL OPENING ENDOSCOPIC: ICD-10-PCS | Performed by: SURGERY

## 2022-11-28 DEVICE — SEAMGUARD STPL REINF ENDO GIA ULTRA UNIV 60 PURPLE: Type: IMPLANTABLE DEVICE | Site: ABDOMEN | Status: FUNCTIONAL

## 2022-11-28 RX ORDER — MAGNESIUM HYDROXIDE 1200 MG/15ML
LIQUID ORAL AS NEEDED
Status: DISCONTINUED | OUTPATIENT
Start: 2022-11-28 | End: 2022-11-28 | Stop reason: HOSPADM

## 2022-11-28 RX ORDER — SODIUM CHLORIDE 9 MG/ML
INJECTION, SOLUTION INTRAVENOUS AS NEEDED
Status: DISCONTINUED | OUTPATIENT
Start: 2022-11-28 | End: 2022-11-28 | Stop reason: HOSPADM

## 2022-11-28 RX ORDER — MORPHINE SULFATE 4 MG/ML
4 INJECTION, SOLUTION INTRAMUSCULAR; INTRAVENOUS EVERY 4 HOURS PRN
Status: DISCONTINUED | OUTPATIENT
Start: 2022-11-28 | End: 2022-11-29 | Stop reason: HOSPADM

## 2022-11-28 RX ORDER — HEPARIN SODIUM 5000 [USP'U]/ML
5000 INJECTION, SOLUTION INTRAVENOUS; SUBCUTANEOUS
Status: COMPLETED | OUTPATIENT
Start: 2022-11-28 | End: 2022-11-28

## 2022-11-28 RX ORDER — FENTANYL CITRATE/PF 50 MCG/ML
25 SYRINGE (ML) INJECTION
Status: DISCONTINUED | OUTPATIENT
Start: 2022-11-28 | End: 2022-11-28 | Stop reason: HOSPADM

## 2022-11-28 RX ORDER — ROCURONIUM BROMIDE 10 MG/ML
INJECTION, SOLUTION INTRAVENOUS AS NEEDED
Status: DISCONTINUED | OUTPATIENT
Start: 2022-11-28 | End: 2022-11-28

## 2022-11-28 RX ORDER — SCOLOPAMINE TRANSDERMAL SYSTEM 1 MG/1
1 PATCH, EXTENDED RELEASE TRANSDERMAL ONCE
Status: DISCONTINUED | OUTPATIENT
Start: 2022-11-28 | End: 2022-11-29 | Stop reason: HOSPADM

## 2022-11-28 RX ORDER — ACETAMINOPHEN 325 MG/1
975 TABLET ORAL EVERY 8 HOURS SCHEDULED
Status: DISCONTINUED | OUTPATIENT
Start: 2022-11-28 | End: 2022-11-29 | Stop reason: HOSPADM

## 2022-11-28 RX ORDER — FAMOTIDINE 10 MG/ML
20 INJECTION, SOLUTION INTRAVENOUS 2 TIMES DAILY
Status: DISCONTINUED | OUTPATIENT
Start: 2022-11-28 | End: 2022-11-29 | Stop reason: HOSPADM

## 2022-11-28 RX ORDER — METOCLOPRAMIDE HYDROCHLORIDE 5 MG/ML
10 INJECTION INTRAMUSCULAR; INTRAVENOUS EVERY 6 HOURS PRN
Status: DISCONTINUED | OUTPATIENT
Start: 2022-11-28 | End: 2022-11-29 | Stop reason: HOSPADM

## 2022-11-28 RX ORDER — BUPIVACAINE HYDROCHLORIDE 5 MG/ML
INJECTION, SOLUTION EPIDURAL; INTRACAUDAL AS NEEDED
Status: DISCONTINUED | OUTPATIENT
Start: 2022-11-28 | End: 2022-11-28 | Stop reason: HOSPADM

## 2022-11-28 RX ORDER — ALBUTEROL SULFATE 2.5 MG/3ML
2.5 SOLUTION RESPIRATORY (INHALATION) ONCE AS NEEDED
Status: DISCONTINUED | OUTPATIENT
Start: 2022-11-28 | End: 2022-11-28 | Stop reason: HOSPADM

## 2022-11-28 RX ORDER — OXYCODONE HCL 5 MG/5 ML
10 SOLUTION, ORAL ORAL EVERY 4 HOURS PRN
Status: DISCONTINUED | OUTPATIENT
Start: 2022-11-28 | End: 2022-11-29 | Stop reason: HOSPADM

## 2022-11-28 RX ORDER — PROPOFOL 10 MG/ML
INJECTION, EMULSION INTRAVENOUS AS NEEDED
Status: DISCONTINUED | OUTPATIENT
Start: 2022-11-28 | End: 2022-11-28

## 2022-11-28 RX ORDER — PROMETHAZINE HYDROCHLORIDE 25 MG/ML
25 INJECTION, SOLUTION INTRAMUSCULAR; INTRAVENOUS EVERY 6 HOURS PRN
Status: DISCONTINUED | OUTPATIENT
Start: 2022-11-28 | End: 2022-11-29 | Stop reason: HOSPADM

## 2022-11-28 RX ORDER — HYDRALAZINE HYDROCHLORIDE 20 MG/ML
10 INJECTION INTRAMUSCULAR; INTRAVENOUS ONCE
Status: COMPLETED | OUTPATIENT
Start: 2022-11-28 | End: 2022-11-28

## 2022-11-28 RX ORDER — ACETAMINOPHEN 325 MG/1
975 TABLET ORAL ONCE
Status: COMPLETED | OUTPATIENT
Start: 2022-11-28 | End: 2022-11-28

## 2022-11-28 RX ORDER — CEFAZOLIN SODIUM 2 G/50ML
2000 SOLUTION INTRAVENOUS EVERY 8 HOURS
Status: COMPLETED | OUTPATIENT
Start: 2022-11-28 | End: 2022-11-29

## 2022-11-28 RX ORDER — DIPHENHYDRAMINE HCL 25 MG
25 TABLET ORAL EVERY 8 HOURS PRN
Status: DISCONTINUED | OUTPATIENT
Start: 2022-11-28 | End: 2022-11-29 | Stop reason: HOSPADM

## 2022-11-28 RX ORDER — SIMETHICONE 80 MG
80 TABLET,CHEWABLE ORAL 4 TIMES DAILY PRN
Status: DISCONTINUED | OUTPATIENT
Start: 2022-11-28 | End: 2022-11-29 | Stop reason: HOSPADM

## 2022-11-28 RX ORDER — LIDOCAINE HYDROCHLORIDE 10 MG/ML
INJECTION, SOLUTION EPIDURAL; INFILTRATION; INTRACAUDAL; PERINEURAL AS NEEDED
Status: DISCONTINUED | OUTPATIENT
Start: 2022-11-28 | End: 2022-11-28

## 2022-11-28 RX ORDER — GABAPENTIN 300 MG/1
300 CAPSULE ORAL ONCE
Status: COMPLETED | OUTPATIENT
Start: 2022-11-28 | End: 2022-11-28

## 2022-11-28 RX ORDER — ONDANSETRON 2 MG/ML
4 INJECTION INTRAMUSCULAR; INTRAVENOUS ONCE AS NEEDED
Status: DISCONTINUED | OUTPATIENT
Start: 2022-11-28 | End: 2022-11-28 | Stop reason: HOSPADM

## 2022-11-28 RX ORDER — MIDAZOLAM HYDROCHLORIDE 2 MG/2ML
INJECTION, SOLUTION INTRAMUSCULAR; INTRAVENOUS AS NEEDED
Status: DISCONTINUED | OUTPATIENT
Start: 2022-11-28 | End: 2022-11-28

## 2022-11-28 RX ORDER — HYDROMORPHONE HCL/PF 1 MG/ML
0.5 SYRINGE (ML) INJECTION
Status: DISCONTINUED | OUTPATIENT
Start: 2022-11-28 | End: 2022-11-28 | Stop reason: HOSPADM

## 2022-11-28 RX ORDER — ONDANSETRON 2 MG/ML
INJECTION INTRAMUSCULAR; INTRAVENOUS AS NEEDED
Status: DISCONTINUED | OUTPATIENT
Start: 2022-11-28 | End: 2022-11-28

## 2022-11-28 RX ORDER — FENTANYL CITRATE 50 UG/ML
INJECTION, SOLUTION INTRAMUSCULAR; INTRAVENOUS AS NEEDED
Status: DISCONTINUED | OUTPATIENT
Start: 2022-11-28 | End: 2022-11-28

## 2022-11-28 RX ORDER — PROMETHAZINE HYDROCHLORIDE 25 MG/ML
12.5 INJECTION, SOLUTION INTRAMUSCULAR; INTRAVENOUS ONCE AS NEEDED
Status: DISCONTINUED | OUTPATIENT
Start: 2022-11-28 | End: 2022-11-28 | Stop reason: HOSPADM

## 2022-11-28 RX ORDER — OXYCODONE HCL 5 MG/5 ML
5 SOLUTION, ORAL ORAL EVERY 4 HOURS PRN
Status: DISCONTINUED | OUTPATIENT
Start: 2022-11-28 | End: 2022-11-29 | Stop reason: HOSPADM

## 2022-11-28 RX ORDER — ACETAMINOPHEN 160 MG/5ML
975 SUSPENSION, ORAL (FINAL DOSE FORM) ORAL EVERY 8 HOURS SCHEDULED
Status: DISCONTINUED | OUTPATIENT
Start: 2022-11-28 | End: 2022-11-29 | Stop reason: HOSPADM

## 2022-11-28 RX ORDER — METRONIDAZOLE 500 MG/100ML
500 INJECTION, SOLUTION INTRAVENOUS EVERY 8 HOURS
Status: COMPLETED | OUTPATIENT
Start: 2022-11-28 | End: 2022-11-29

## 2022-11-28 RX ORDER — METRONIDAZOLE 500 MG/100ML
500 INJECTION, SOLUTION INTRAVENOUS ONCE
Status: COMPLETED | OUTPATIENT
Start: 2022-11-28 | End: 2022-11-28

## 2022-11-28 RX ORDER — SODIUM CHLORIDE, SODIUM LACTATE, POTASSIUM CHLORIDE, CALCIUM CHLORIDE 600; 310; 30; 20 MG/100ML; MG/100ML; MG/100ML; MG/100ML
100 INJECTION, SOLUTION INTRAVENOUS CONTINUOUS
Status: DISCONTINUED | OUTPATIENT
Start: 2022-11-28 | End: 2022-11-29 | Stop reason: HOSPADM

## 2022-11-28 RX ORDER — CELECOXIB 200 MG/1
200 CAPSULE ORAL ONCE
Status: COMPLETED | OUTPATIENT
Start: 2022-11-28 | End: 2022-11-28

## 2022-11-28 RX ORDER — SODIUM CHLORIDE, SODIUM LACTATE, POTASSIUM CHLORIDE, CALCIUM CHLORIDE 600; 310; 30; 20 MG/100ML; MG/100ML; MG/100ML; MG/100ML
125 INJECTION, SOLUTION INTRAVENOUS CONTINUOUS
Status: DISCONTINUED | OUTPATIENT
Start: 2022-11-28 | End: 2022-11-28

## 2022-11-28 RX ADMIN — SODIUM CHLORIDE, SODIUM LACTATE, POTASSIUM CHLORIDE, AND CALCIUM CHLORIDE: .6; .31; .03; .02 INJECTION, SOLUTION INTRAVENOUS at 14:35

## 2022-11-28 RX ADMIN — SODIUM CHLORIDE, SODIUM LACTATE, POTASSIUM CHLORIDE, AND CALCIUM CHLORIDE 100 ML/HR: 600; 310; 30; 20 INJECTION, SOLUTION INTRAVENOUS at 22:07

## 2022-11-28 RX ADMIN — GABAPENTIN 300 MG: 300 CAPSULE ORAL at 11:23

## 2022-11-28 RX ADMIN — SCOPALAMINE 1 PATCH: 1 PATCH, EXTENDED RELEASE TRANSDERMAL at 11:23

## 2022-11-28 RX ADMIN — CELECOXIB 200 MG: 200 CAPSULE ORAL at 11:23

## 2022-11-28 RX ADMIN — HEPARIN SODIUM 5000 UNITS: 5000 INJECTION INTRAVENOUS; SUBCUTANEOUS at 12:30

## 2022-11-28 RX ADMIN — ROCURONIUM BROMIDE 20 MG: 10 INJECTION, SOLUTION INTRAVENOUS at 14:21

## 2022-11-28 RX ADMIN — FENTANYL CITRATE 50 MCG: 50 INJECTION INTRAMUSCULAR; INTRAVENOUS at 15:14

## 2022-11-28 RX ADMIN — CEFAZOLIN SODIUM 2000 MG: 2 SOLUTION INTRAVENOUS at 23:23

## 2022-11-28 RX ADMIN — ONDANSETRON 4 MG: 2 INJECTION INTRAMUSCULAR; INTRAVENOUS at 16:11

## 2022-11-28 RX ADMIN — ACETAMINOPHEN 975 MG: 325 TABLET, FILM COATED ORAL at 11:23

## 2022-11-28 RX ADMIN — SODIUM CHLORIDE, SODIUM LACTATE, POTASSIUM CHLORIDE, AND CALCIUM CHLORIDE 125 ML/HR: .6; .31; .03; .02 INJECTION, SOLUTION INTRAVENOUS at 11:23

## 2022-11-28 RX ADMIN — LIDOCAINE HYDROCHLORIDE 100 MG: 10 INJECTION, SOLUTION EPIDURAL; INFILTRATION; INTRACAUDAL; PERINEURAL at 13:52

## 2022-11-28 RX ADMIN — PROPOFOL 200 MG: 10 INJECTION, EMULSION INTRAVENOUS at 13:52

## 2022-11-28 RX ADMIN — METRONIDAZOLE: 500 INJECTION, SOLUTION INTRAVENOUS at 14:00

## 2022-11-28 RX ADMIN — FENTANYL CITRATE 25 MCG: 50 INJECTION INTRAMUSCULAR; INTRAVENOUS at 17:04

## 2022-11-28 RX ADMIN — HYDRALAZINE HYDROCHLORIDE 10 MG: 20 INJECTION INTRAMUSCULAR; INTRAVENOUS at 17:27

## 2022-11-28 RX ADMIN — FENTANYL CITRATE 100 MCG: 50 INJECTION INTRAMUSCULAR; INTRAVENOUS at 13:52

## 2022-11-28 RX ADMIN — METOCLOPRAMIDE 10 MG: 5 INJECTION, SOLUTION INTRAMUSCULAR; INTRAVENOUS at 19:44

## 2022-11-28 RX ADMIN — ROCURONIUM BROMIDE 10 MG: 10 INJECTION, SOLUTION INTRAVENOUS at 15:06

## 2022-11-28 RX ADMIN — ROCURONIUM BROMIDE 20 MG: 10 INJECTION, SOLUTION INTRAVENOUS at 14:47

## 2022-11-28 RX ADMIN — FENTANYL CITRATE 50 MCG: 50 INJECTION INTRAMUSCULAR; INTRAVENOUS at 14:47

## 2022-11-28 RX ADMIN — ROCURONIUM BROMIDE 20 MG: 10 INJECTION, SOLUTION INTRAVENOUS at 15:34

## 2022-11-28 RX ADMIN — SUGAMMADEX 300 MG: 100 INJECTION, SOLUTION INTRAVENOUS at 16:31

## 2022-11-28 RX ADMIN — ROCURONIUM BROMIDE 50 MG: 10 INJECTION, SOLUTION INTRAVENOUS at 13:52

## 2022-11-28 RX ADMIN — FENTANYL CITRATE 50 MCG: 50 INJECTION INTRAMUSCULAR; INTRAVENOUS at 15:41

## 2022-11-28 RX ADMIN — MIDAZOLAM 2 MG: 1 INJECTION INTRAMUSCULAR; INTRAVENOUS at 13:48

## 2022-11-28 RX ADMIN — SODIUM CHLORIDE, SODIUM LACTATE, POTASSIUM CHLORIDE, AND CALCIUM CHLORIDE: .6; .31; .03; .02 INJECTION, SOLUTION INTRAVENOUS at 16:23

## 2022-11-28 RX ADMIN — FENTANYL CITRATE 50 MCG: 50 INJECTION INTRAMUSCULAR; INTRAVENOUS at 14:57

## 2022-11-28 RX ADMIN — FAMOTIDINE 20 MG: 10 INJECTION INTRAVENOUS at 22:02

## 2022-11-28 RX ADMIN — FENTANYL CITRATE 50 MCG: 50 INJECTION INTRAMUSCULAR; INTRAVENOUS at 14:25

## 2022-11-28 RX ADMIN — Medication 3000 MG: at 13:58

## 2022-11-28 RX ADMIN — FENTANYL CITRATE 25 MCG: 50 INJECTION INTRAMUSCULAR; INTRAVENOUS at 17:14

## 2022-11-28 RX ADMIN — METRONIDAZOLE 500 MG: 500 INJECTION, SOLUTION INTRAVENOUS at 22:03

## 2022-11-28 NOTE — OP NOTE
[facility]   [address] [facility], [address]  [phone] (Fax)      Operative Report  BYPASS GASTRIC  R-N-Y  INTRAOPERATIVE EGD;     Patient Name: Lorie Apodaca    :  1975  MRN: [id number]  Patient Location: [facility] OR ROOM 06  Surgery Date : 2022  Surgeons:  Surgeon(s) and Role:     * gAnes Hough MD - Primary     * Heather Naranjo MD - Assistant    Diagnosis:    Pre-Op Diagnosis Codes: Morbid obesity ([facility] 75 ) [E66 01]  Body mass index is 49 67 kg/m²  Obstructive sleep apnea (adult) (pediatric) [G47 33]  Diabetes mellitus ([facility] ) [E11 9]  Esophageal reflux [K21 9]    Post-Op Diagnosis Codes:     * Morbid obesity ([facility] ) [E66 01]     * Obstructive sleep apnea (adult) (pediatric) [G47 33]     * Diabetes mellitus ([facility] ) [E11 9]     * Esophageal reflux [K21 9]     * Body mass index is 49 67 kg/m²  Procedure  1  Laparoscopic Merry-en-Y Gastric Bypass  2  Intraoperative Endoscopy    Specimen(s):  * No specimens in log *    Estimated Blood Loss:    40 cc    Anesthesia Type:     General    Operative Indications: Morbid obesity ([facility] ) [E66 01]  Obstructive sleep apnea (adult) (pediatric) [G47 33]  Diabetes mellitus ([facility] ) [E11 9]  Esophageal reflux [K21 9]  Body mass index is 49 67 kg/m²  Operative Findings:    Normal anatomy    Complications:     None    Procedure and Technique:    INDICATION:    Lorie Apodcaa is a 50 y o  female with a Body mass index is 49 67 kg/m²  and a long standing history of morbid obesity and inability to lose a significant amount of weight on its own  This patient was found to be a good candidate to undergo a bariatric procedure upon being enrolled here at the [address] [place] [address]  OPERATIVE TECHNIQUE    The patient was taken to the operating room and placed in a supine position  A dose of IV antibiotic prophylaxis that consisted of Ancef 3g and Metronidazole 500mg was given   Also, 5000 units of subcutaneous unfractionated heparin to prevent DVT were administered  Sequential compression devices were placed on both lower extremities  After satisfactory general anesthesia induction and endotracheal intubation was achieved, the extremities were secured to prevent neurovascular and musculoskeletal injuries as best as possible  Subsequently, the abdominal wall was prepped and draped in a surgical standard sterile fashion  After a timeout was done and the patient was properly identified and the type of procedure was confirmed a supra-umbilical transverse skin incision was made, and the subcutaneous tissues dissected  Access to the peritoneal cavity was gained with an optical trocar  With this device, we were able to visualize the layers of the abdominal wall, and enter the peritoneal cavity under direct visualization  Pneumoperitoneum was then established with CO2 insufflation  A four quadrant transversus abdominis plane block was performed under direct laparoscopic vision  After this was completed four additional trocars were placed: a 12 mm in the right upper quadrant subcostal position in the anterior axillary line, a 12-mm port was placed in the right flank midclavicular line, a 12-mm port was placed in the left upper quadrant subcostal position in the midclavicular line and another 12-mm port was placed in the left quadrant anterior axillary line lateral to the supraumbilical port  With the trocars in place, the dissection was begun  The omentum of the transverse colon was identified and elevated, this allowed for the ligament of Treitz to be visualized  The small bowel was run about 60 cm to a point distal from the ligament of Treitz and was divided with a stapler and a 60 mm cartridge  The Merry limb was then measured at 100 cm, and the 100 cm kiarra was brought in side-to-side opposition to the biliopancreatic limb  A side-to-side jejunojejunostomy was then created   This was accomplished by first making an antimesenteric enterotomy with cautery energy device  We then positioned the laparoscopic stapler with a 60-mm cartridge within the lumen of the bowel to create a stapled side-to-side anastomosis  The enterotomy was then approximated with a 2-0 silk suture, subsequently elevated and the stapler was then reloaded and positioned perpendicularly to the first staple lines, just below the margin of the enterotomy on the antimesenteric border of the bowel and closed transversely utilizing an additional 60-mm cartridge  The resulting mesenteric defect was then closed with a running nonabsorbable suture  A Brolin stitch was placed to prevent kinking  At this point we repositioned the patient into a reverse Trendelenburg and the [facility] liver retractor was placed in the subxiphoid position through the use of a 5-mm trocar incision  We then turned our attention to the gastroesophageal junction  The left fadi was skeletonized dissecting at the angle of His  The pars flaccida was identified and incised  The lesser sac was entered below the lesser curve at the level just inferior to the take off of the left gastric artery  The left gastric artery and hepatic vagal branches were preserved  We then created a 30 cc gastric pouch  To accomplish this, serial firings of a laparoscopic stapler 60-mm cartridge were utilized  The staple lines were reinforced with a butressing material  We created a pouch based on the lesser curve and in vertical orientation  This was accomplished by a  transverse firing of the stapler along the inferior edge of the pouch and then vertical serial firings of the stapler to the angle of His  This completely  the pouch from the gastric remnant  A 25 mm circular stapler anvil was passed through the mouth and into the esophagus and subsequently placed within the pouch    The inferior edge of the pouch was then opened with cautery and the anvil stem was brought through the anterior aspect of the pouch close to the staple line  We proceeded to divide the omentum all the way to the transverse colon  The end of the Merry limb was opened with the cautery and the circular stapling device was brought through the dilated left upper quadrant 12-mm port site, and passed through the open end of the Merry limb  The Merry limb was then passed in a antecolic and antegastric position to the pouch  This was accomplished without tension and without twist   The stem of the stapling device was then brought through the antimesenteric border of the Merry limb adjacent to the pouch  The stem and the anvil were united and the stapler was fired  This created an end-to-side gastrojejunostomy  The excess Merry limb proximal to the anastomosis was then resected with the cautery energy device and a laparoscopic stapler with a 60-mm cartridge  The anastomosis was reinforced with interrupted absorbable sutures at the intersection of the staple lines  The distal Merry limb was occluded and an EGD as well as an air insufflation test were performed  No intraoperative bleeding nor leaks were detected  I then covered the G-J anastomosis with a tongue of omentum in a circumferential Chadd patch fashion and secured it in place with a single 2/0 Vicryl stitch  The sponge, needle and instrument count was reported complete  The 12-mm port site on the left flank that was dilated for the circular stapler as well as the Visiport trocar were then closed with the use of a suture closure device and a 0 absorbable suture  The liver retractor was removed under direct laparoscopic visualization, and no bleeding was noted  The remaining ports were then also removed under laparoscopic visualization  The skin incisions were all closed with 4-0 absorbable subcuticular suture  The patient tolerated the procedure well, was extubated uneventfully and was transferred to the recovery room in stable condition      I was present for the entire length of the procedure as the attending of record  No qualified resident was available to assist   The presence of an assistant was necessary for camera holding, traction and counter traction and for help with suturing and stapling in addition to performing the intraop-EGD        Patient Disposition:    PACU     Signature: Jesus Manuel Banks MD  Date: November 28, 2022  Time: 4:18 PM

## 2022-11-28 NOTE — ANESTHESIA PREPROCEDURE EVALUATION
Procedure:  BYPASS GASTRIC  R-N-Y  INTRAOPERATIVE EGD; PSB SLEEVE GASTRECTOMY (Abdomen)    Relevant Problems   CARDIO   (+) Chest pain   (+) Essential hypertension   (+) Heart murmur      ENDO   (+) Type 2 diabetes mellitus without complication, without long-term current use of insulin (HCC)      GI/HEPATIC   (+) Chronic GERD      PULMONARY   (+) ANGE (obstructive sleep apnea)   (+) Viral URI with cough      Other   (+) CPAP (continuous positive airway pressure) dependence   (+) Polydipsia        Physical Exam    Airway    Mallampati score: II  TM Distance: >3 FB  Neck ROM: full     Dental   No notable dental hx     Cardiovascular  Rhythm: regular, Rate: normal, Cardiovascular exam normal    Pulmonary  Pulmonary exam normal Breath sounds clear to auscultation,     Other Findings        Anesthesia Plan  ASA Score- 3     Anesthesia Type- general with ASA Monitors  Additional Monitors:   Airway Plan: ETT  Comment: SCOP patch ordered  Plan Factors-    Chart reviewed  Existing labs reviewed  Patient summary reviewed  Patient is not a current smoker  Patient instructed to abstain from smoking on day of procedure  Patient smoked on day of surgery  There is medical exclusion for perioperative obstructive sleep apnea risk education  Induction- intravenous  Postoperative Plan-     Informed Consent- Anesthetic plan and risks discussed with patient and daughter

## 2022-11-28 NOTE — PLAN OF CARE
Problem: PAIN - ADULT  Goal: Verbalizes/displays adequate comfort level or baseline comfort level  Description: Interventions:  - Encourage patient to monitor pain and request assistance  - Assess pain using appropriate pain scale  - Administer analgesics based on type and severity of pain and evaluate response  - Implement non-pharmacological measures as appropriate and evaluate response  - Consider cultural and social influences on pain and pain management  - Notify physician/advanced practitioner if interventions unsuccessful or patient reports new pain  Outcome: Progressing     Problem: INFECTION - ADULT  Goal: Absence or prevention of progression during hospitalization  Description: INTERVENTIONS:  - Assess and monitor for signs and symptoms of infection  - Monitor lab/diagnostic results  - Monitor all insertion sites, i e  indwelling lines, tubes, and drains  - Monitor endotracheal if appropriate and nasal secretions for changes in amount and color  - [place] appropriate cooling/warming therapies per order  - Administer medications as ordered  - Instruct and encourage patient and family to use good hand hygiene technique  - Identify and instruct in appropriate isolation precautions for identified infection/condition  Outcome: Progressing  Goal: Absence of fever/infection during neutropenic period  Description: INTERVENTIONS:  - Monitor WBC    Outcome: Progressing     Problem: CARDIOVASCULAR - ADULT  Goal: Maintains optimal cardiac output and hemodynamic stability  Description: INTERVENTIONS:  - Monitor I/O, vital signs and rhythm  - Monitor for S/S and trends of decreased cardiac output  - Administer and titrate ordered vasoactive medications to optimize hemodynamic stability  - Assess quality of pulses, skin color and temperature  - Assess for signs of decreased coronary artery perfusion  - Instruct patient to report change in severity of symptoms  Outcome: Progressing  Goal: Absence of cardiac dysrhythmias or at baseline rhythm  Description: INTERVENTIONS:  - Continuous cardiac monitoring, vital signs, obtain 12 lead EKG if ordered  - Administer antiarrhythmic and heart rate control medications as ordered  - Monitor electrolytes and administer replacement therapy as ordered  Outcome: Progressing     Problem: RESPIRATORY - ADULT  Goal: Achieves optimal ventilation and oxygenation  Description: INTERVENTIONS:  - Assess for changes in respiratory status  - Assess for changes in mentation and behavior  - Position to facilitate oxygenation and minimize respiratory effort  - Oxygen administered by appropriate delivery if ordered  - Initiate smoking cessation education as indicated  - Encourage broncho-pulmonary hygiene including cough, deep breathe, Incentive Spirometry  - Assess the need for suctioning and aspirate as needed  - Assess and instruct to report SOB or any respiratory difficulty  - Respiratory Therapy support as indicated  Outcome: Progressing     Problem: GASTROINTESTINAL - ADULT  Goal: Minimal or absence of nausea and/or vomiting  Description: INTERVENTIONS:  - Administer IV fluids if ordered to ensure adequate hydration  - Maintain NPO status until nausea and vomiting are resolved  - Nasogastric tube if ordered  - Administer ordered antiemetic medications as needed  - Provide nonpharmacologic comfort measures as appropriate  - Advance diet as tolerated, if ordered  - Consider nutrition services referral to assist patient with adequate nutrition and appropriate food choices  Outcome: Progressing  Goal: Maintains or returns to baseline bowel function  Description: INTERVENTIONS:  - Assess bowel function  - Encourage oral fluids to ensure adequate hydration  - Administer IV fluids if ordered to ensure adequate hydration  - Administer ordered medications as needed  - Encourage mobilization and activity  - Consider nutritional services referral to assist patient with adequate nutrition and appropriate food choices  Outcome: Progressing  Goal: Maintains adequate nutritional intake  Description: INTERVENTIONS:  - Monitor percentage of each meal consumed  - Identify factors contributing to decreased intake, treat as appropriate  - Assist with meals as needed  - Monitor I&O, weight, and lab values if indicated  - Obtain nutrition services referral as needed  Outcome: Progressing     Problem: GENITOURINARY - ADULT  Goal: Maintains or returns to baseline urinary function  Description: INTERVENTIONS:  - Assess urinary function  - Encourage oral fluids to ensure adequate hydration if ordered  - Administer IV fluids as ordered to ensure adequate hydration  - Administer ordered medications as needed  - Offer frequent toileting  - Follow urinary retention protocol if ordered  Outcome: Progressing  Goal: Absence of urinary retention  Description: INTERVENTIONS:  - Assess patient’s ability to void and empty bladder  - Monitor I/O  - Bladder scan as needed  - Discuss with physician/AP medications to alleviate retention as needed  - Discuss catheterization for long term situations as appropriate  Outcome: Progressing     Problem: METABOLIC, FLUID AND ELECTROLYTES - ADULT  Goal: Electrolytes maintained within normal limits  Description: INTERVENTIONS:  - Monitor labs and assess patient for signs and symptoms of electrolyte imbalances  - Administer electrolyte replacement as ordered  - Monitor response to electrolyte replacements, including repeat lab results as appropriate  - Instruct patient on fluid and nutrition as appropriate  Outcome: Progressing  Goal: Fluid balance maintained  Description: INTERVENTIONS:  - Monitor labs   - Monitor I/O and WT  - Instruct patient on fluid and nutrition as appropriate  - Assess for signs & symptoms of volume excess or deficit  Outcome: Progressing  Goal: Glucose maintained within target range  Description: INTERVENTIONS:  - Monitor Blood Glucose as ordered  - Assess for signs and symptoms of hyperglycemia and hypoglycemia  - Administer ordered medications to maintain glucose within target range  - Assess nutritional intake and initiate nutrition service referral as needed  Outcome: Progressing     Problem: SKIN/TISSUE INTEGRITY - ADULT  Goal: Skin Integrity remains intact(Skin Breakdown Prevention)  Description: Assess:  -Perform Cesar assessment every   -Clean and moisturize skin every   -Inspect skin when repositioning, toileting, and assisting with ADLS  -Assess under medical devices such as  every   -Assess extremities for adequate circulation and sensation     Bed Management:  -Have minimal linens on bed & keep smooth, unwrinkled  -Change linens as needed when moist or perspiring  -Avoid sitting or lying in one position for more than  hours while in bed  -Keep HOB at degrees     Toileting:  -Offer bedside commode  -Assess for incontinence every   -Use incontinent care products after each incontinent episode such as     Activity:  -Mobilize patient  times a day  -Encourage activity and walks on unit  -Encourage or provide ROM exercises   -Turn and reposition patient every  Hours  -Use appropriate equipment to lift or move patient in bed  -Instruct/ Assist with weight shifting every  when out of bed in chair  -Consider limitation of chair time  hour intervals    Skin Care:  -Avoid use of baby powder, tape, friction and shearing, hot water or constrictive clothing  -Relieve pressure over bony prominences using   -Do not massage red bony areas    Next Steps:  -Teach patient strategies to minimize risks such as    -Consider consults to  interdisciplinary teams such as   Outcome: Progressing  Goal: Incision(s), wounds(s) or drain site(s) healing without S/S of infection  Description: INTERVENTIONS  - Assess and document dressing, incision, wound bed, drain sites and surrounding tissue  - Provide patient and family education  - Perform skin care/dressing changes   Outcome: Progressing Problem: HEMATOLOGIC - ADULT  Goal: Maintains hematologic stability  Description: INTERVENTIONS  - Assess for signs and symptoms of bleeding or hemorrhage  - Monitor labs  - Administer supportive blood products/factors as ordered and appropriate  Outcome: Progressing

## 2022-11-28 NOTE — ANESTHESIA POSTPROCEDURE EVALUATION
Post-Op Assessment Note    CV Status:  Stable  Pain Score: 4    Pain management: adequate     Mental Status:  Alert and awake   Hydration Status:  Euvolemic   PONV Controlled:  Controlled   Airway Patency:  Patent  Airway: intubated   Two or more mitigation strategies used for obstructive sleep apnea   Post Op Vitals Reviewed: Yes      Staff: Anesthesiologist, CRNA         No notable events documented      BP      Temp      Pulse     Resp      SpO2      /74   Pulse 84   Temp 97 5 °F (36 4 °C)   Resp 14   Ht 5' 1 5" (1 562 m)   Wt 121 kg (267 lb 3 2 oz)   LMP 11/15/2022 (Within Days) Comment: urine preg negative  SpO2 96%   Breastfeeding No   BMI 49 67 kg/m²

## 2022-11-28 NOTE — INTERVAL H&P NOTE
H&P reviewed  After examining the patient I find no changes in the patients condition since the H&P had been written  Once again I have explained to her that if I determine inside the OR that it is not safe to do the gastric bypass we will stage her first with a sleeve gastrectomy as a safer first procedure and plan for the gastric bypass or a CLIFTON in the future      Vitals:    11/28/22 1059   BP: 131/76   Pulse: (!) 108   Resp: 16   Temp: 98 2 °F (36 8 °C)   SpO2: 97%

## 2022-11-29 VITALS
BODY MASS INDEX: 49.17 KG/M2 | HEART RATE: 81 BPM | OXYGEN SATURATION: 95 % | TEMPERATURE: 98.4 F | RESPIRATION RATE: 18 BRPM | WEIGHT: 267.2 LBS | SYSTOLIC BLOOD PRESSURE: 153 MMHG | DIASTOLIC BLOOD PRESSURE: 79 MMHG | HEIGHT: 62 IN

## 2022-11-29 LAB
ANION GAP SERPL CALCULATED.3IONS-SCNC: 6 MMOL/L (ref 4–13)
BUN SERPL-MCNC: 4 MG/DL (ref 5–25)
CALCIUM SERPL-MCNC: 8.6 MG/DL (ref 8.3–10.1)
CHLORIDE SERPL-SCNC: 103 MMOL/L (ref 96–108)
CO2 SERPL-SCNC: 29 MMOL/L (ref 21–32)
CREAT SERPL-MCNC: 0.52 MG/DL (ref 0.6–1.3)
ERYTHROCYTE [DISTWIDTH] IN BLOOD BY AUTOMATED COUNT: 15.1 % (ref 11.6–15.1)
GFR SERPL CREATININE-BSD FRML MDRD: 112 ML/MIN/1.73SQ M
GLUCOSE SERPL-MCNC: 120 MG/DL (ref 65–140)
GLUCOSE SERPL-MCNC: 136 MG/DL (ref 65–140)
HCT VFR BLD AUTO: 35.3 % (ref 34.8–46.1)
HGB BLD-MCNC: 11.1 G/DL (ref 11.5–15.4)
MCH RBC QN AUTO: 26.1 PG (ref 26.8–34.3)
MCHC RBC AUTO-ENTMCNC: 31.4 G/DL (ref 31.4–37.4)
MCV RBC AUTO: 83 FL (ref 82–98)
PLATELET # BLD AUTO: 260 THOUSANDS/UL (ref 149–390)
PMV BLD AUTO: 12.5 FL (ref 8.9–12.7)
POTASSIUM SERPL-SCNC: 3.5 MMOL/L (ref 3.5–5.3)
RBC # BLD AUTO: 4.25 MILLION/UL (ref 3.81–5.12)
SODIUM SERPL-SCNC: 138 MMOL/L (ref 135–147)
WBC # BLD AUTO: 10.34 THOUSAND/UL (ref 4.31–10.16)

## 2022-11-29 RX ORDER — ONDANSETRON 2 MG/ML
4 INJECTION INTRAMUSCULAR; INTRAVENOUS EVERY 4 HOURS PRN
Status: DISCONTINUED | OUTPATIENT
Start: 2022-11-29 | End: 2022-11-29 | Stop reason: HOSPADM

## 2022-11-29 RX ORDER — ACETAMINOPHEN 160 MG/5ML
975 SUSPENSION, ORAL (FINAL DOSE FORM) ORAL EVERY 8 HOURS SCHEDULED
Qty: 638.4 ML | Refills: 0 | Status: SHIPPED | OUTPATIENT
Start: 2022-11-29 | End: 2022-12-05

## 2022-11-29 RX ADMIN — ACETAMINOPHEN 975 MG: 325 TABLET, FILM COATED ORAL at 14:10

## 2022-11-29 RX ADMIN — OXYCODONE HYDROCHLORIDE 10 MG: 5 SOLUTION ORAL at 08:33

## 2022-11-29 RX ADMIN — METRONIDAZOLE 500 MG: 500 INJECTION, SOLUTION INTRAVENOUS at 07:25

## 2022-11-29 RX ADMIN — FAMOTIDINE 20 MG: 10 INJECTION INTRAVENOUS at 08:33

## 2022-11-29 RX ADMIN — OXYCODONE HYDROCHLORIDE 5 MG: 5 SOLUTION ORAL at 01:22

## 2022-11-29 RX ADMIN — SODIUM CHLORIDE, SODIUM LACTATE, POTASSIUM CHLORIDE, AND CALCIUM CHLORIDE 100 ML/HR: 600; 310; 30; 20 INJECTION, SOLUTION INTRAVENOUS at 10:21

## 2022-11-29 RX ADMIN — CEFAZOLIN SODIUM 2000 MG: 2 SOLUTION INTRAVENOUS at 05:49

## 2022-11-29 NOTE — DISCHARGE INSTR - AVS FIRST PAGE
your medications from [address] in [facility] [address] or cut your pills and open capsules, mix with liquid to drink  Take Tylenol every 8 hours around the clock, unless instructed otherwise  Take your omeprazole daily  It is important to stay hydrated and follow your discharge diet progression   Mild nausea is ok as long as you can drink fluids, sip very slowly and get up and walk during any periods of nausea  You may shower normally after 48 hours, but do not scrub incision sites, blot gently with clean towel to dry incisions  Take home medications as usual unless instructed otherwise while in hospital  Follow up with Dr Niki Infante and your PCP within the next week  Sleeve gastrectomy patients ONLY: Complete full course of lovenox injections!

## 2022-11-29 NOTE — DISCHARGE INSTRUCTIONS
Bariatric/Weight Loss Surgery  Hospital Discharge Instructions  ACTIVITY:  Progress as feels comfortable - a good rule is:  if you are doing something and it begins to hurt, stop doing the activity  Walk every hour while at home  You may walk stairs if you do so slowly  You may shower 48 hours after surgery  Do not scrub incision sites  Blot gently with clean towel to dry incisions  (see #4 below)   Use your incentive spirometer 10 times per hour while awake for 1 week after surgery  Do NOT drive for 48 hours after surgery  No driving 24 hours after taking certain prescription pain medications  Examples of such medication are Percocet, Darvocet, Oxycodone, Tylenol #3, and Tylenol with Codeine  DIET  Stay on a liquid diet for 7 days after your surgery date, sipping slowly  Refer to your manual for examples of choices  Remember to keep your liquids sugar free or low calorie  You may have protein drinks  Make sure to drink 48 to 64 ounces per day of fluids  You may advance to a pureed diet one week after surgery as instructed by your diet progression pamphlet  Once you get approval from your surgeon at your first post operative visit, you may advance to the soft diet and remain on soft diet for 8 weeks unless otherwise instructed  MEDICATIONS:  The abdominal nerve block will wear off during the first 1-2 days that you are home, and you may become sore (especially over incision site/sites where abdominal wall is sutured)  This may create a pulling sensation, especially while moving around, and will fade over time  Continue to take your Tylenol and your pain medication as instructed  Start vitamins and minerals one week after surgery or when you start stage 3/puree diet  Anti-acid Medication as per prescription  Other medications as indicated on the Physician Patient Discharge Instructions form given to you at the time of discharge    Make sure that you are splitting your pill or tablet medications in halves or fourths or even crushing them before you take them  Capsules should be opened and mixed with water or jello  You need to do this for at least 4 weeks after surgery  Eventually you will be able to take your medications the regular way as they were prescribed  You will need to consult with your Family Doctor in regards to all your prescribed medication, particularly those for blood pressure and diabetes  As you lose weight, medical conditions may change, requiring an alteration or elimination of the drug dose  Monitor blood pressure closely and call PCP with any concerns  Sleeve Gastrectomy patients ONLY:  Complete full course of lovenox injections! DO NOT TAKE BIRTH CONTROL(BC) MEDICATIONS, INSERT BC VAGINAL RINGS, OR PLACE IUD OR ANY OTHER BC METHODS UNTIL 31 DAYS FROM DAY OF DISCHARGE FROM HOSPITAL  THIS PLACES YOU AT HIGH RISK FOR A POTENTIALLY LIFE THREATENING BLOOD CLOT  Remember to always use barrier methods for birth control and speak to your GYN about using two forms of birth control to start 31 days after surgery  It is very important to avoid pregnancy until at least 18-24 months after surgery  INCISION CARE  You may shower and get incisions wet 2 days after surgery  No soaking tub baths or swimming for 30 days after surgery  Keep abdominal area and incisions clean  Use soap and water to create a good lather and rinse off  Do not scrub incisions  If you have a drain, empty the drain as the nurses instructed  FOLLOW-UP APPOINTMENT should be made for one week after discharge  Call surgeon’s office at [phone] to schedule an appointment      CALL YOUR DOCTOR FOR:  pain not controlled by pain medications, a temperature greater than 101 5° F, any increase or change in drainage or redness from any incision, any vomiting or inability to keep liquids down, shortness of breath, shoulder pain, or bleeding

## 2022-11-29 NOTE — PLAN OF CARE
Problem: PAIN - ADULT  Goal: Verbalizes/displays adequate comfort level or baseline comfort level  Description: Interventions:  - Encourage patient to monitor pain and request assistance  - Assess pain using appropriate pain scale  - Administer analgesics based on type and severity of pain and evaluate response  - Implement non-pharmacological measures as appropriate and evaluate response  - Consider cultural and social influences on pain and pain management  - Notify physician/advanced practitioner if interventions unsuccessful or patient reports new pain  Outcome: Progressing     Problem: INFECTION - ADULT  Goal: Absence or prevention of progression during hospitalization  Description: INTERVENTIONS:  - Assess and monitor for signs and symptoms of infection  - Monitor lab/diagnostic results  - Monitor all insertion sites, i e  indwelling lines, tubes, and drains  - Monitor endotracheal if appropriate and nasal secretions for changes in amount and color  - Crucible appropriate cooling/warming therapies per order  - Administer medications as ordered  - Instruct and encourage patient and family to use good hand hygiene technique  - Identify and instruct in appropriate isolation precautions for identified infection/condition  Outcome: Progressing  Goal: Absence of fever/infection during neutropenic period  Description: INTERVENTIONS:  - Monitor WBC    Outcome: Progressing     Problem: CARDIOVASCULAR - ADULT  Goal: Maintains optimal cardiac output and hemodynamic stability  Description: INTERVENTIONS:  - Monitor I/O, vital signs and rhythm  - Monitor for S/S and trends of decreased cardiac output  - Administer and titrate ordered vasoactive medications to optimize hemodynamic stability  - Assess quality of pulses, skin color and temperature  - Assess for signs of decreased coronary artery perfusion  - Instruct patient to report change in severity of symptoms  Outcome: Progressing  Goal: Absence of cardiac dysrhythmias or at baseline rhythm  Description: INTERVENTIONS:  - Continuous cardiac monitoring, vital signs, obtain 12 lead EKG if ordered  - Administer antiarrhythmic and heart rate control medications as ordered  - Monitor electrolytes and administer replacement therapy as ordered  Outcome: Progressing     Problem: RESPIRATORY - ADULT  Goal: Achieves optimal ventilation and oxygenation  Description: INTERVENTIONS:  - Assess for changes in respiratory status  - Assess for changes in mentation and behavior  - Position to facilitate oxygenation and minimize respiratory effort  - Oxygen administered by appropriate delivery if ordered  - Initiate smoking cessation education as indicated  - Encourage broncho-pulmonary hygiene including cough, deep breathe, Incentive Spirometry  - Assess the need for suctioning and aspirate as needed  - Assess and instruct to report SOB or any respiratory difficulty  - Respiratory Therapy support as indicated  Outcome: Progressing     Problem: GASTROINTESTINAL - ADULT  Goal: Minimal or absence of nausea and/or vomiting  Description: INTERVENTIONS:  - Administer IV fluids if ordered to ensure adequate hydration  - Maintain NPO status until nausea and vomiting are resolved  - Nasogastric tube if ordered  - Administer ordered antiemetic medications as needed  - Provide nonpharmacologic comfort measures as appropriate  - Advance diet as tolerated, if ordered  - Consider nutrition services referral to assist patient with adequate nutrition and appropriate food choices  Outcome: Progressing  Goal: Maintains or returns to baseline bowel function  Description: INTERVENTIONS:  - Assess bowel function  - Encourage oral fluids to ensure adequate hydration  - Administer IV fluids if ordered to ensure adequate hydration  - Administer ordered medications as needed  - Encourage mobilization and activity  - Consider nutritional services referral to assist patient with adequate nutrition and appropriate food choices  Outcome: Progressing  Goal: Maintains adequate nutritional intake  Description: INTERVENTIONS:  - Monitor percentage of each meal consumed  - Identify factors contributing to decreased intake, treat as appropriate  - Assist with meals as needed  - Monitor I&O, weight, and lab values if indicated  - Obtain nutrition services referral as needed  Outcome: Progressing     Problem: GENITOURINARY - ADULT  Goal: Maintains or returns to baseline urinary function  Description: INTERVENTIONS:  - Assess urinary function  - Encourage oral fluids to ensure adequate hydration if ordered  - Administer IV fluids as ordered to ensure adequate hydration  - Administer ordered medications as needed  - Offer frequent toileting  - Follow urinary retention protocol if ordered  Outcome: Progressing  Goal: Absence of urinary retention  Description: INTERVENTIONS:  - Assess patient’s ability to void and empty bladder  - Monitor I/O  - Bladder scan as needed  - Discuss with physician/AP medications to alleviate retention as needed  - Discuss catheterization for long term situations as appropriate  Outcome: Progressing     Problem: METABOLIC, FLUID AND ELECTROLYTES - ADULT  Goal: Electrolytes maintained within normal limits  Description: INTERVENTIONS:  - Monitor labs and assess patient for signs and symptoms of electrolyte imbalances  - Administer electrolyte replacement as ordered  - Monitor response to electrolyte replacements, including repeat lab results as appropriate  - Instruct patient on fluid and nutrition as appropriate  Outcome: Progressing  Goal: Fluid balance maintained  Description: INTERVENTIONS:  - Monitor labs   - Monitor I/O and WT  - Instruct patient on fluid and nutrition as appropriate  - Assess for signs & symptoms of volume excess or deficit  Outcome: Progressing  Goal: Glucose maintained within target range  Description: INTERVENTIONS:  - Monitor Blood Glucose as ordered  - Assess for signs and symptoms of hyperglycemia and hypoglycemia  - Administer ordered medications to maintain glucose within target range  - Assess nutritional intake and initiate nutrition service referral as needed  Outcome: Progressing     Problem: HEMATOLOGIC - ADULT  Goal: Maintains hematologic stability  Description: INTERVENTIONS  - Assess for signs and symptoms of bleeding or hemorrhage  - Monitor labs  - Administer supportive blood products/factors as ordered and appropriate  Outcome: Progressing     Problem: Potential for Falls  Goal: Patient will remain free of falls  Description: INTERVENTIONS:  - Educate patient/family on patient safety including physical limitations  - Instruct patient to call for assistance with activity   - Consult OT/PT to assist with strengthening/mobility   - Keep Call bell within reach  - Keep bed low and locked with side rails adjusted as appropriate  - Keep care items and personal belongings within reach  - Initiate and maintain comfort rounds  - Make Fall Risk Sign visible to staff  - Offer Toileting every 2 Hours, in advance of need  - Initiate/Maintain bed alarm  - Obtain necessary fall risk management equipment: socks   - Apply yellow socks and bracelet for high fall risk patients  - Consider moving patient to room near nurses station  Outcome: Progressing

## 2022-11-29 NOTE — DISCHARGE SUMMARY
Discharge Summary - Jay Ji 50 y o  female MRN: [id number]    Unit/Bed#: E5 -01 Encounter: [id number]      Pre-Operative Diagnosis: Pre-Op Diagnosis Codes:     * Morbid obesity ([facility] ) [E66 01]     * Obstructive sleep apnea (adult) (pediatric) [G47 33]     * Diabetes mellitus ([place] [address] ) [E11 9]     * Esophageal reflux [K21 9]    Post-Operative Diagnosis: Post-Op Diagnosis Codes:     * Morbid obesity ([facility] ) [E66 01]     * Obstructive sleep apnea (adult) (pediatric) [G47 33]     * Diabetes mellitus ([place] [address] ) [E11 9]     * Esophageal reflux [K21 9]    Procedures Performed:  Procedure(s):  BYPASS GASTRIC  R-N-Y  INTRAOPERATIVE EGD;    Surgeon: Arie White MD    See H & P for full details of admission and Operative Note for full details of operations performed  Patient tolerated surgery well without complications  In the morning postoperative Day 1, the patient had mild nausea and abdominal pain  Tolerated a clear liquid diet without vomiting  Able to ambulate and voiding independently  Patient was deemed ready for discharge home  SLIM consulted for home medication management  Patient was seen and examined prior to discharge  Provisions for Follow-Up Care:  See After Visit Summary/Discharge Instructions for information related to follow-up care and home orders  Disposition: Home, in stable condition  Planned Readmission: No    Discharge Medications:  See After Visit Summary/Discharge Instructions for reconciled discharge medications provided to patient and family  Post Operative instructions: Reviewed with patient and/or family      Signature:   Alison Dobson PA-C  Date: 11/29/2022 Time: 12:00 PM

## 2022-11-29 NOTE — CONSULTS
[address]  1975, 50 y o  female MRN: [id number]  Unit/Bed#: E5 -01 Encounter: [id number]  Primary Care Provider: Jeff Lechuga DO   Date and time admitted to hospital: 11/28/2022 10:35 AM    Inpatient consult to Internal Medicine  Consult performed by: Julio Cesar Alvarez MD  Consult ordered by: Madisyn Mcallister PA-C          * Morbid obesity with BMI of 45 0-49 9, adult [facility])  Assessment & Plan  50year old female with PMH of obesity, T2DM and HTN presented for elective gastric bypass  S/P Merry n Y Bypass 11/28  Tolerating clear liquid diet  Consulted for medical mgmt  Recommend follow up with bariatric outpatent  Continue PPI    Type 2 diabetes mellitus without complication, without long-term current use of insulin [facility])  Assessment & Plan  Lab Results   Component Value Date    HGBA1C 6 1 11/18/2022       Recent Labs     11/28/22  1109 11/28/22  1455 11/28/22  1650 11/28/22  2046   POCGLU 204* 120 166* 193*     Not on insulin, currently on metformin  Hold metformin on discharge  Recommend checking BG twice daily fasting, follow up with PCP outpatient    Essential hypertension  Assessment & Plan  Currently on lisinopril, held on admission  Recommend holding until follow up with PCP        Recommendations for Discharge:  · Hold lisinopril, metformin on discharge  · Follow up with PCP with regular home BP and BG logs    Counseling / Coordination of Care Time: 45 minutes  Greater than 50% of total time spent on patient counseling and coordination of care  Collaboration of Care: Were Recommendations Directly Discussed with Primary Treatment Team? - Yes     History of Present Illness: Tona Milner is a 50 y o  female who is originally admitted to the bariatric service due to elective   We are consulted for medical management  Patient presented for elective Merry-en-Y bypass, completed on 11/28    Patient tolerated procedure well, currently on clear liquid diet, with episodes of nausea without emesis  No events overnight, discussed discontinue metformin, lisinopril discharge  Patient recommended to check fasting blood glucose twice daily prior to meals at varying times, and check blood pressure at breast once daily  Patient follow-up with PCP outpatient with blood pressure and blood glucose logs, in order to consider resuming metformin/lisinopril  No complaints at this time, denies any chest pain, shortness of breath and reports voiding without difficulty  Review of Systems:    Review of Systems   Constitutional: Positive for activity change and appetite change  Negative for chills and fever  HENT: Negative for congestion, facial swelling, sinus pain and sore throat  Respiratory: Negative for cough, shortness of breath and wheezing  Cardiovascular: Negative for chest pain, palpitations and leg swelling  Gastrointestinal: Positive for nausea  Negative for abdominal distention, abdominal pain, constipation, diarrhea and vomiting  Endocrine: Negative for cold intolerance, heat intolerance, polydipsia, polyphagia and polyuria  Genitourinary: Negative for dysuria, flank pain and urgency  Skin: Negative for color change and pallor  Neurological: Negative for dizziness, syncope, weakness, light-headedness and headaches  Psychiatric/Behavioral: Negative for agitation, confusion and dysphoric mood         Past Medical and Surgical History:     Past Medical History:   Diagnosis Date   • CPAP (continuous positive airway pressure) dependence    • Diabetes mellitus (HCC)     Type 2   • GERD (gastroesophageal reflux disease)    • Heart murmur    • Hypertension    • Seasonal allergies    • Sleep apnea        Past Surgical History:   Procedure Laterality Date   •  SECTION  10/04/2005   • MD LAP GASTRIC BYPASS/GRUPO-EN-Y N/A 2022    Procedure: BYPASS GASTRIC  R-N-Y  INTRAOPERATIVE EGD;;  Surgeon: Meera Landeros MD;  Location: [facility]; Service: Bariatrics   • TUBAL LIGATION  2005       Meds/Allergies:    all medications and allergies reviewed    Allergies: No Known Allergies    Social History:     Marital Status: Single    Substance Use History:   Social History     Substance and Sexual Activity   Alcohol Use Never     Social History     Tobacco Use   Smoking Status Never   Smokeless Tobacco Never     Social History     Substance and Sexual Activity   Drug Use Never       Family History:    Family History   Problem Relation Age of Onset   • Hypertension Mother    • Diabetes Mother    • Hypertension Father    • Diabetes Father    • Cancer Maternal Grandmother    • Diabetes Maternal Grandfather        Physical Exam:     Vitals:   Blood Pressure: 153/79 (11/29/22 0720)  Pulse: 81 (11/29/22 0720)  Temperature: 98 4 °F (36 9 °C) (11/29/22 0720)  Temp Source: Oral (11/29/22 0405)  Respirations: 18 (11/29/22 0720)  Height: 5' 1 5" (156 2 cm) (11/28/22 1059)  Weight - Scale: 121 kg (267 lb 3 2 oz) (11/28/22 1059)  SpO2: 95 % (11/29/22 0720)    Physical Exam  Vitals and nursing note reviewed  Constitutional:       Appearance: Normal appearance  She is obese  HENT:      Head: Normocephalic and atraumatic  Eyes:      General: No scleral icterus  Conjunctiva/sclera: Conjunctivae normal    Cardiovascular:      Rate and Rhythm: Normal rate and regular rhythm  Pulmonary:      Effort: Pulmonary effort is normal  No respiratory distress  Breath sounds: No wheezing  Abdominal:      General: Bowel sounds are normal  There is no distension  Palpations: Abdomen is soft  Tenderness: There is no abdominal tenderness  Musculoskeletal:         General: No swelling  Right lower leg: No edema  Left lower leg: No edema  Skin:     General: Skin is warm and dry  Neurological:      General: No focal deficit present  Mental Status: She is alert  Mental status is at baseline     Psychiatric:         Mood and Affect: Mood normal  Additional Data:     Lab Results: I have personally reviewed pertinent reports  Results from last 7 days   Lab Units 11/29/22  0602   WBC Thousand/uL 10 34*   HEMOGLOBIN g/dL 11 1*   HEMATOCRIT % 35 3   PLATELETS Thousands/uL 260     Results from last 7 days   Lab Units 11/29/22  0602   SODIUM mmol/L 138   POTASSIUM mmol/L 3 5   CHLORIDE mmol/L 103   CO2 mmol/L 29   BUN mg/dL 4*   CREATININE mg/dL 0 52*   ANION GAP mmol/L 6   CALCIUM mg/dL 8 6   GLUCOSE RANDOM mg/dL 136             Lab Results   Component Value Date/Time    HGBA1C 6 1 11/18/2022 04:38 PM    HGBA1C 6 2 07/11/2022 05:22 PM    HGBA1C 6 8 (A) 04/06/2022 01:40 PM     Results from last 7 days   Lab Units 11/28/22  2046 11/28/22  1650 11/28/22  1455 11/28/22  1109   POC GLUCOSE mg/dl 193* 166* 120 204*           Imaging: I have personally reviewed pertinent reports  No orders to display       EKG, Pathology, and Other Studies Reviewed on Admission:   · EKG: None    ** Please Note: This note has been constructed using a voice recognition system   **

## 2022-11-29 NOTE — ASSESSMENT & PLAN NOTE
50year old female with PMH of obesity, T2DM and HTN presented for elective gastric bypass  S/P Merry n Y Bypass 11/28  Tolerating clear liquid diet  Consulted for medical mgmt  Recommend follow up with bariatric outpatent  Continue PPI

## 2022-11-29 NOTE — PLAN OF CARE
Problem: PAIN - ADULT  Goal: Verbalizes/displays adequate comfort level or baseline comfort level  Description: Interventions:  - Encourage patient to monitor pain and request assistance  - Assess pain using appropriate pain scale  - Administer analgesics based on type and severity of pain and evaluate response  - Implement non-pharmacological measures as appropriate and evaluate response  - Consider cultural and social influences on pain and pain management  - Notify physician/advanced practitioner if interventions unsuccessful or patient reports new pain  11/29/2022 1616 by Vahe Rabago RN  Outcome: Completed  11/29/2022 0720 by Vahe Rabago RN  Outcome: Progressing     Problem: INFECTION - ADULT  Goal: Absence or prevention of progression during hospitalization  Description: INTERVENTIONS:  - Assess and monitor for signs and symptoms of infection  - Monitor lab/diagnostic results  - Monitor all insertion sites, i e  indwelling lines, tubes, and drains  - Monitor endotracheal if appropriate and nasal secretions for changes in amount and color  - [place] appropriate cooling/warming therapies per order  - Administer medications as ordered  - Instruct and encourage patient and family to use good hand hygiene technique  - Identify and instruct in appropriate isolation precautions for identified infection/condition  11/29/2022 1616 by Vahe Rabago RN  Outcome: Completed  11/29/2022 0720 by Vahe Rabago RN  Outcome: Progressing  Goal: Absence of fever/infection during neutropenic period  Description: INTERVENTIONS:  - Monitor WBC    11/29/2022 1616 by Vahe Rabago RN  Outcome: Completed  11/29/2022 0720 by Vahe Rabago RN  Outcome: Progressing     Problem: CARDIOVASCULAR - ADULT  Goal: Maintains optimal cardiac output and hemodynamic stability  Description: INTERVENTIONS:  - Monitor I/O, vital signs and rhythm  - Monitor for S/S and trends of decreased cardiac output  - Administer and titrate ordered vasoactive medications to optimize hemodynamic stability  - Assess quality of pulses, skin color and temperature  - Assess for signs of decreased coronary artery perfusion  - Instruct patient to report change in severity of symptoms  11/29/2022 1616 by Vish Hunter RN  Outcome: Completed  11/29/2022 0720 by Vish Hunter RN  Outcome: Progressing  Goal: Absence of cardiac dysrhythmias or at baseline rhythm  Description: INTERVENTIONS:  - Continuous cardiac monitoring, vital signs, obtain 12 lead EKG if ordered  - Administer antiarrhythmic and heart rate control medications as ordered  - Monitor electrolytes and administer replacement therapy as ordered  11/29/2022 1616 by Vish Hunter RN  Outcome: Completed  11/29/2022 0720 by Vish Hnuter RN  Outcome: Progressing     Problem: RESPIRATORY - ADULT  Goal: Achieves optimal ventilation and oxygenation  Description: INTERVENTIONS:  - Assess for changes in respiratory status  - Assess for changes in mentation and behavior  - Position to facilitate oxygenation and minimize respiratory effort  - Oxygen administered by appropriate delivery if ordered  - Initiate smoking cessation education as indicated  - Encourage broncho-pulmonary hygiene including cough, deep breathe, Incentive Spirometry  - Assess the need for suctioning and aspirate as needed  - Assess and instruct to report SOB or any respiratory difficulty  - Respiratory Therapy support as indicated  11/29/2022 1616 by Vish Hunter RN  Outcome: Completed  11/29/2022 0720 by Vish Hunter RN  Outcome: Progressing     Problem: GASTROINTESTINAL - ADULT  Goal: Minimal or absence of nausea and/or vomiting  Description: INTERVENTIONS:  - Administer IV fluids if ordered to ensure adequate hydration  - Maintain NPO status until nausea and vomiting are resolved  - Nasogastric tube if ordered  - Administer ordered antiemetic medications as needed  - Provide nonpharmacologic comfort measures as appropriate  - Advance diet as tolerated, if ordered  - Consider nutrition services referral to assist patient with adequate nutrition and appropriate food choices  11/29/2022 1616 by Crystal Bryant RN  Outcome: Completed  11/29/2022 0720 by Crystal Bryant RN  Outcome: Progressing  Goal: Maintains or returns to baseline bowel function  Description: INTERVENTIONS:  - Assess bowel function  - Encourage oral fluids to ensure adequate hydration  - Administer IV fluids if ordered to ensure adequate hydration  - Administer ordered medications as needed  - Encourage mobilization and activity  - Consider nutritional services referral to assist patient with adequate nutrition and appropriate food choices  11/29/2022 1616 by Crystal Bryant RN  Outcome: Completed  11/29/2022 0720 by Crystal Bryant RN  Outcome: Progressing  Goal: Maintains adequate nutritional intake  Description: INTERVENTIONS:  - Monitor percentage of each meal consumed  - Identify factors contributing to decreased intake, treat as appropriate  - Assist with meals as needed  - Monitor I&O, weight, and lab values if indicated  - Obtain nutrition services referral as needed  11/29/2022 1616 by Crystal Bryant RN  Outcome: Completed  11/29/2022 0720 by Crystal Bryant RN  Outcome: Progressing     Problem: GENITOURINARY - ADULT  Goal: Maintains or returns to baseline urinary function  Description: INTERVENTIONS:  - Assess urinary function  - Encourage oral fluids to ensure adequate hydration if ordered  - Administer IV fluids as ordered to ensure adequate hydration  - Administer ordered medications as needed  - Offer frequent toileting  - Follow urinary retention protocol if ordered  11/29/2022 1616 by Crystal Bryant RN  Outcome: Completed  11/29/2022 0720 by Crystal Bryant RN  Outcome: Progressing  Goal: Absence of urinary retention  Description: INTERVENTIONS:  - Assess patient’s ability to void and empty bladder  - Monitor I/O  - Bladder scan as needed  - Discuss with physician/AP medications to alleviate retention as needed  - Discuss catheterization for long term situations as appropriate  11/29/2022 1616 by Raymond Fox RN  Outcome: Completed  11/29/2022 0720 by Raymond Fox RN  Outcome: Progressing     Problem: METABOLIC, FLUID AND ELECTROLYTES - ADULT  Goal: Electrolytes maintained within normal limits  Description: INTERVENTIONS:  - Monitor labs and assess patient for signs and symptoms of electrolyte imbalances  - Administer electrolyte replacement as ordered  - Monitor response to electrolyte replacements, including repeat lab results as appropriate  - Instruct patient on fluid and nutrition as appropriate  11/29/2022 1616 by Raymond Fxo RN  Outcome: Completed  11/29/2022 0720 by Raymond Fox RN  Outcome: Progressing  Goal: Fluid balance maintained  Description: INTERVENTIONS:  - Monitor labs   - Monitor I/O and WT  - Instruct patient on fluid and nutrition as appropriate  - Assess for signs & symptoms of volume excess or deficit  11/29/2022 1616 by Raymond Fox RN  Outcome: Completed  11/29/2022 0720 by Raymond Fox RN  Outcome: Progressing  Goal: Glucose maintained within target range  Description: INTERVENTIONS:  - Monitor Blood Glucose as ordered  - Assess for signs and symptoms of hyperglycemia and hypoglycemia  - Administer ordered medications to maintain glucose within target range  - Assess nutritional intake and initiate nutrition service referral as needed  11/29/2022 1616 by Raymond Fox RN  Outcome: Completed  11/29/2022 0720 by Raymond Fox RN  Outcome: Progressing     Problem: SKIN/TISSUE INTEGRITY - ADULT  Goal: Skin Integrity remains intact(Skin Breakdown Prevention)  Description: Assess:  -Perform Cesar assessment every   -Clean and moisturize skin every   -Inspect skin when repositioning, toileting, and assisting with ADLS  -Assess under medical devices such as  every   -Assess extremities for adequate circulation and sensation     Bed Management:  -Have minimal linens on bed & keep smooth, unwrinkled  -Change linens as needed when moist or perspiring  -Avoid sitting or lying in one position for more than  hours while in bed  -Keep HOB at degrees     Toileting:  -Offer bedside commode  -Assess for incontinence every   -Use incontinent care products after each incontinent episode such as     Activity:  -Mobilize patient  times a day  -Encourage activity and walks on unit  -Encourage or provide ROM exercises   -Turn and reposition patient every  Hours  -Use appropriate equipment to lift or move patient in bed  -Instruct/ Assist with weight shifting every  when out of bed in chair  -Consider limitation of chair time  hour intervals    Skin Care:  -Avoid use of baby powder, tape, friction and shearing, hot water or constrictive clothing  -Relieve pressure over bony prominences using   -Do not massage red bony areas    Next Steps:  -Teach patient strategies to minimize risks such as    -Consider consults to  interdisciplinary teams such as   11/29/2022 1616 by Gia Priest RN  Outcome: Completed  11/29/2022 0720 by Gia Priest RN  Outcome: Progressing  Goal: Incision(s), wounds(s) or drain site(s) healing without S/S of infection  Description: INTERVENTIONS  - Assess and document dressing, incision, wound bed, drain sites and surrounding tissue  - Provide patient and family education  - Perform skin care/dressing changes every   11/29/2022 1616 by Gia Priest RN  Outcome: Completed  11/29/2022 0720 by Gia Priest RN  Outcome: Progressing     Problem: HEMATOLOGIC - ADULT  Goal: Maintains hematologic stability  Description: INTERVENTIONS  - Assess for signs and symptoms of bleeding or hemorrhage  - Monitor labs  - Administer supportive blood products/factors as ordered and appropriate  11/29/2022 1616 by Gia Priest RN  Outcome: Completed  11/29/2022 0720 by Gia Priest RN  Outcome: Progressing     Problem: Potential for Falls  Goal: Patient will remain free of falls  Description: INTERVENTIONS:  - Educate patient/family on patient safety including physical limitations  - Instruct patient to call for assistance with activity   - Consult OT/PT to assist with strengthening/mobility   - Keep Call bell within reach  - Keep bed low and locked with side rails adjusted as appropriate  - Keep care items and personal belongings within reach  - Initiate and maintain comfort rounds  - Make Fall Risk Sign visible to staff  - Offer Toileting every  Hours, in advance of need  - Initiate/Maintain alarm  - Obtain necessary fall risk management equipment  - Apply yellow socks and bracelet for high fall risk patients  - Consider moving patient to room near nurses station  11/29/2022 1616 by Nancy Ng RN  Outcome: Completed  11/29/2022 0720 by Nancy Ng RN  Outcome: Progressing

## 2022-11-29 NOTE — ASSESSMENT & PLAN NOTE
Lab Results   Component Value Date    HGBA1C 6 1 11/18/2022       Recent Labs     11/28/22  1109 11/28/22  1455 11/28/22  1650 11/28/22  2046   POCGLU 204* 120 166* 193*     Not on insulin, currently on metformin  Hold metformin on discharge  Recommend checking BG twice daily fasting, follow up with PCP outpatient

## 2022-11-29 NOTE — UTILIZATION REVIEW
Initial Clinical Review    Elective 11439 surgical procedure  Age/Sex: 50 y o  female  Surgery Date: 11/28/2022  Procedure: Laparoscopic Merry-en-Y Gastric Bypass  Intraoperative Endoscopy  Anesthesia: General     POD#1 Progress Note: Pain controlled  Tolerating diet       Admission Orders: Date/Time/Statement:   Admission Orders (From admission, onward)     Ordered        11/28/22 1220  Inpatient Admission  Once                      Orders Placed This Encounter   Procedures   • Inpatient Admission     Standing Status:   Standing     Number of Occurrences:   1     Order Specific Question:   Level of Care     Answer:   Med Surg [16]     Order Specific Question:   Estimated length of stay     Answer:   Inpatient Only Surgery     Vital Signs: /79 (BP Location: Right arm)   Pulse 81   Temp 98 4 °F (36 9 °C)   Resp 18   Ht 5' 1 5" (1 562 m)   Wt 121 kg (267 lb 3 2 oz)   LMP 11/15/2022 (Within Days) Comment: urine preg negative  SpO2 95%   Breastfeeding No   BMI 49 67 kg/m²     Pertinent Labs/Diagnostic Test Results:   No orders to display          Date/Time Temp Pulse Resp BP MAP (mmHg) SpO2 Calculated FIO2 (%) - Nasal Cannula O2 Flow Rate (L/min) Nasal Cannula O2 Flow Rate (L/min) O2 Device Cardiac (WDL) Patient Position - Orthostatic VS   11/29/22 0933 -- -- -- -- -- -- -- -- -- None (Room air) -- --   11/29/22 07:20:35 98 4 °F (36 9 °C) 81 18 153/79 104 95 % -- -- -- None (Room air) -- --   11/29/22 04:05:13 98 8 °F (37 1 °C) 93 19 146/77 -- 94 % -- -- -- -- -- Lying   11/29/22 00:19:38 98 7 °F (37 1 °C) 98 18 159/82 108 95 % -- -- -- -- -- --   11/28/22 19:15:58 98 9 °F (37 2 °C) 90 16 146/82 103 98 % -- -- -- -- -- --   11/28/22 1900 -- 87 -- 151/82 105 99 % -- -- -- -- -- --   11/28/22 1816 -- -- -- -- -- -- 28 -- 2 L/min EtCO2 nasal cannula -- --   11/28/22 18:11:52 98 6 °F (37 °C) 95 19 153/83 106 97 % 28 -- 2 L/min Nasal cannula -- --   11/28/22 1748 97 3 °F (36 3 °C) Abnormal  88 16 135/63 89 100 % -- 2 L/min -- Nasal cannula WDL --   11/28/22 1733 -- 86 20 143/67 97 100 % -- 3 L/min -- Nasal cannula WDL --   11/28/22 1718 -- 80 10 Abnormal  158/74 106 100 % -- 3 L/min -- Nasal cannula WDL --   11/28/22 1703 -- 84 14 151/74 104 96 % -- -- -- None (Room air) -- --   11/28/22 1648 97 5 °F (36 4 °C) 90 12 159/73 105 100 % -- 6 L/min -- Simple mask WDL --   11/28/22 1059 98 2 °F (36 8 °C) 108 Abnormal  16 131/76 -- 97 % -- -- -- None (Room air) -- --     Weights (last 14 days)        Results from last 7 days   Lab Units 11/29/22  0602   WBC Thousand/uL 10 34*   HEMOGLOBIN g/dL 11 1*   HEMATOCRIT % 35 3   PLATELETS Thousands/uL 260       Results from last 7 days   Lab Units 11/29/22  0602   SODIUM mmol/L 138   POTASSIUM mmol/L 3 5   CHLORIDE mmol/L 103   CO2 mmol/L 29   ANION GAP mmol/L 6   BUN mg/dL 4*   CREATININE mg/dL 0 52*   EGFR ml/min/1 73sq m 112   CALCIUM mg/dL 8 6       Results from last 7 days   Lab Units 11/28/22  2046 11/28/22  1650 11/28/22  1455 11/28/22  1109   POC GLUCOSE mg/dl 193* 166* 120 204*     Results from last 7 days   Lab Units 11/29/22  0602   GLUCOSE RANDOM mg/dL 136       Diet: Bariatric clear  Mobility: UP as tolerated  DVT Prophylaxis: SCD    Medications/Pain Control:   Scheduled Medications:  acetaminophen, 975 mg, Oral, Q8H [address]   Or  acetaminophen, 975 mg, Oral, Q8H MIGNON  famotidine, 20 mg, Intravenous, BID  scopolamine, 1 patch, Transdermal, Once      Continuous IV Infusions:  lactated ringers, 100 mL/hr, Intravenous, Continuous      PRN Meds:  diphenhydrAMINE, 25 mg, Oral, Q8H PRN  metoclopramide, 10 mg, Intravenous, Q6H PRN  morphine injection, 4 mg, Intravenous, Q4H PRN  ondansetron, 4 mg, Intravenous, Q4H PRN  oxyCODONE, 10 mg, Oral, Q4H PRN  oxyCODONE, 5 mg, Oral, Q4H PRN  phenol, 2 spray, Mouth/Throat, Q2H PRN  promethazine, 25 mg, Intravenous, Q6H PRN  simethicone, 80 mg, Oral, 4x Daily PRN        Network Utilization Review Department  ATTENTION: Please call with any questions or concerns to [phone] and carefully listen to the prompts so that you are directed to the right person  All voicemails are confidential   Carrie Rodríguez all requests for admission clinical reviews, approved or denied determinations and any other requests to dedicated fax number below belonging to the campus where the patient is receiving treatment   List of dedicated fax numbers for the Facilities:  [address] DENIALS (Administrative/Medical Necessity) [phone]   [address] (Maternity/NICU/Pediatrics) [phone]   [address] [phone]   [facility] [phone]   [address] [address] Tej Araujo [facility] 28 [phone]   [facility]3 [facility] Belinda Galvan [facility] 134 815 [facility] [phone]

## 2022-11-29 NOTE — PROGRESS NOTES
Patient is here for follow up neck and back.  Blossom Trevino DO  Electronically signed by Shayy London LPN on 83/67/0323 at 11:27 AM Progress Note - Bariatric Surgery   Pola Mcdonald 50 y o  female MRN: [id number]  Unit/Bed#: E5 -01 Encounter: [id number]      Subjective/Objective     Subjective:  Patient with Morbid obesity with BMI of 45 0-49 9, adult ([address] ) now 1 Day Post-Op s/p  laparascopic RYGB  Patient denies fevers, chills, sweats, SOB, CP, calf pain  Pain adequately controlled on oral pain medication  Ambulating without assistance, voiding well, and using incentive spirometer  Patient tolerating liquid diet without nausea or vomiting today  Vital signs stable  CBC today shows   Lab Results   Component Value Date    HCT 35 3 11/29/2022      Lab Results   Component Value Date    HGB 11 1 (L) 11/29/2022     BMP obtained today   Lab Results   Component Value Date    CREATININE 0 60 09/15/2022      Lab Results   Component Value Date    K 3 9 09/15/2022     SLIM Consulted yesterday for med management  Objective:    /79 (BP Location: Right arm)   Pulse 81   Temp 98 4 °F (36 9 °C)   Resp 18   Ht 5' 1 5" (1 562 m)   Wt 121 kg (267 lb 3 2 oz)   LMP 11/15/2022 (Within Days) Comment: urine preg negative  SpO2 95%   Breastfeeding No   BMI 49 67 kg/m²       Intake/Output Summary (Last 24 hours) at 11/29/2022 0735  Last data filed at 11/29/2022 0126  Gross per 24 hour   Intake 3000 ml   Output 440 ml   Net 2560 ml       Invasive Devices     Peripheral Intravenous Line  Duration           Peripheral IV 11/28/22 Dorsal (posterior); Left Forearm <1 day                ROS: 10-point system completed  All negative except see HPI      Physical Exam    General Appearance:    Alert, cooperative, no distress, appears stated age   Head:    Normocephalic, without obvious abnormality, atraumatic   Lungs:     Respirations unlabored   Heart:    Regular rate and rhythm   Abdomen:     Soft, appropriate tenderness, no masses, no organomegaly, non-distended   Extremities:   Extremities normal, atraumatic, no cyanosis or edema Neurologic:  Incision:  Psych:   Normal strength and sensation    Clean, dry, and intact, no signs of bleeding    Normal mood and affect       Lab, Imaging and other studies:  CBC:   Lab Results   Component Value Date    WBC 10 34 (H) 11/29/2022    HGB 11 1 (L) 11/29/2022    HCT 35 3 11/29/2022    MCV 83 11/29/2022     11/29/2022    MCH 26 1 (L) 11/29/2022    MCHC 31 4 11/29/2022    RDW 15 1 11/29/2022    MPV 12 5 11/29/2022   , CMP: No results found for: SODIUM, K, CL, CO2, ANIONGAP, BUN, CREATININE, GLUCOSE, CALCIUM, AST, ALT, ALKPHOS, PROT, BILITOT, EGFR   No results found  VTE Mechanical Prophylaxis: sequential compression device    Assessment/Plan  Patient is a 50 y o  female with Morbid obesity with BMI of 45 0-49 9, adult ([address] ) 1 Day Post-Op s/p  laparoscopic RYGB with stable course  Patient afebrile and hemodynamically stable  - Encourage PO fluids   - Recommend ambulation, use of SCDs when not ambulating, and incentive spirometry  - Complete antibiotic course  - SLIM consult pending  - Plan to D/C patient home today pending anticipated progression    Plan of care was discussed with patient    Care plan discussed with Dr Pat Valdez MD  Bariatric Surgery Fellow   11/29/2022  7:35 AM

## 2022-11-29 NOTE — PLAN OF CARE
Problem: PAIN - ADULT  Goal: Verbalizes/displays adequate comfort level or baseline comfort level  Description: Interventions:  - Encourage patient to monitor pain and request assistance  - Assess pain using appropriate pain scale  - Administer analgesics based on type and severity of pain and evaluate response  - Implement non-pharmacological measures as appropriate and evaluate response  - Consider cultural and social influences on pain and pain management  - Notify physician/advanced practitioner if interventions unsuccessful or patient reports new pain  11/29/2022 0720 by Bernabe Gallego RN  Outcome: Progressing  11/28/2022 1827 by Bernabe Gallego RN  Outcome: Progressing     Problem: INFECTION - ADULT  Goal: Absence or prevention of progression during hospitalization  Description: INTERVENTIONS:  - Assess and monitor for signs and symptoms of infection  - Monitor lab/diagnostic results  - Monitor all insertion sites, i e  indwelling lines, tubes, and drains  - Monitor endotracheal if appropriate and nasal secretions for changes in amount and color  - [place] appropriate cooling/warming therapies per order  - Administer medications as ordered  - Instruct and encourage patient and family to use good hand hygiene technique  - Identify and instruct in appropriate isolation precautions for identified infection/condition  11/29/2022 0720 by Bernabe Gallego RN  Outcome: Progressing  11/28/2022 1827 by Bernabe Gallego RN  Outcome: Progressing  Goal: Absence of fever/infection during neutropenic period  Description: INTERVENTIONS:  - Monitor WBC    11/29/2022 0720 by Bernabe Gallego RN  Outcome: Progressing  11/28/2022 1827 by Bernabe Gallego RN  Outcome: Progressing     Problem: CARDIOVASCULAR - ADULT  Goal: Maintains optimal cardiac output and hemodynamic stability  Description: INTERVENTIONS:  - Monitor I/O, vital signs and rhythm  - Monitor for S/S and trends of decreased cardiac output  - Administer and titrate ordered vasoactive medications to optimize hemodynamic stability  - Assess quality of pulses, skin color and temperature  - Assess for signs of decreased coronary artery perfusion  - Instruct patient to report change in severity of symptoms  11/29/2022 0720 by Nathen Dhillon RN  Outcome: Progressing  11/28/2022 1827 by Nathen Dhillon RN  Outcome: Progressing  Goal: Absence of cardiac dysrhythmias or at baseline rhythm  Description: INTERVENTIONS:  - Continuous cardiac monitoring, vital signs, obtain 12 lead EKG if ordered  - Administer antiarrhythmic and heart rate control medications as ordered  - Monitor electrolytes and administer replacement therapy as ordered  11/29/2022 0720 by Nathen Dhillon RN  Outcome: Progressing  11/28/2022 1827 by Nathen Dhillon RN  Outcome: Progressing     Problem: RESPIRATORY - ADULT  Goal: Achieves optimal ventilation and oxygenation  Description: INTERVENTIONS:  - Assess for changes in respiratory status  - Assess for changes in mentation and behavior  - Position to facilitate oxygenation and minimize respiratory effort  - Oxygen administered by appropriate delivery if ordered  - Initiate smoking cessation education as indicated  - Encourage broncho-pulmonary hygiene including cough, deep breathe, Incentive Spirometry  - Assess the need for suctioning and aspirate as needed  - Assess and instruct to report SOB or any respiratory difficulty  - Respiratory Therapy support as indicated  11/29/2022 0720 by Nathen Dhiloln RN  Outcome: Progressing  11/28/2022 1827 by Nathen Dhillon RN  Outcome: Progressing     Problem: GASTROINTESTINAL - ADULT  Goal: Minimal or absence of nausea and/or vomiting  Description: INTERVENTIONS:  - Administer IV fluids if ordered to ensure adequate hydration  - Maintain NPO status until nausea and vomiting are resolved  - Nasogastric tube if ordered  - Administer ordered antiemetic medications as needed  - Provide nonpharmacologic comfort measures as appropriate  - Advance diet as tolerated, if ordered  - Consider nutrition services referral to assist patient with adequate nutrition and appropriate food choices  11/29/2022 0720 by Ashley Kraus RN  Outcome: Progressing  11/28/2022 1827 by Ashley Kraus RN  Outcome: Progressing  Goal: Maintains or returns to baseline bowel function  Description: INTERVENTIONS:  - Assess bowel function  - Encourage oral fluids to ensure adequate hydration  - Administer IV fluids if ordered to ensure adequate hydration  - Administer ordered medications as needed  - Encourage mobilization and activity  - Consider nutritional services referral to assist patient with adequate nutrition and appropriate food choices  11/29/2022 0720 by Ashley Kraus RN  Outcome: Progressing  11/28/2022 1827 by Ashley Kraus RN  Outcome: Progressing  Goal: Maintains adequate nutritional intake  Description: INTERVENTIONS:  - Monitor percentage of each meal consumed  - Identify factors contributing to decreased intake, treat as appropriate  - Assist with meals as needed  - Monitor I&O, weight, and lab values if indicated  - Obtain nutrition services referral as needed  11/29/2022 0720 by Ashley Kraus RN  Outcome: Progressing  11/28/2022 1827 by Ashley Kraus RN  Outcome: Progressing     Problem: GENITOURINARY - ADULT  Goal: Maintains or returns to baseline urinary function  Description: INTERVENTIONS:  - Assess urinary function  - Encourage oral fluids to ensure adequate hydration if ordered  - Administer IV fluids as ordered to ensure adequate hydration  - Administer ordered medications as needed  - Offer frequent toileting  - Follow urinary retention protocol if ordered  11/29/2022 0720 by Ashley Kraus RN  Outcome: Progressing  11/28/2022 1827 by Ashley Kraus RN  Outcome: Progressing  Goal: Absence of urinary retention  Description: INTERVENTIONS:  - Assess patient’s ability to void and empty bladder  - Monitor I/O  - Bladder scan as needed  - Discuss with physician/AP medications to alleviate retention as needed  - Discuss catheterization for long term situations as appropriate  11/29/2022 0720 by Ibis Blake RN  Outcome: Progressing  11/28/2022 1827 by Ibis Blake RN  Outcome: Progressing     Problem: METABOLIC, FLUID AND ELECTROLYTES - ADULT  Goal: Electrolytes maintained within normal limits  Description: INTERVENTIONS:  - Monitor labs and assess patient for signs and symptoms of electrolyte imbalances  - Administer electrolyte replacement as ordered  - Monitor response to electrolyte replacements, including repeat lab results as appropriate  - Instruct patient on fluid and nutrition as appropriate  11/29/2022 0720 by Ibis Blake RN  Outcome: Progressing  11/28/2022 1827 by Ibis Blake RN  Outcome: Progressing  Goal: Fluid balance maintained  Description: INTERVENTIONS:  - Monitor labs   - Monitor I/O and WT  - Instruct patient on fluid and nutrition as appropriate  - Assess for signs & symptoms of volume excess or deficit  11/29/2022 0720 by Ibis Blake RN  Outcome: Progressing  11/28/2022 1827 by Ibis Blake RN  Outcome: Progressing  Goal: Glucose maintained within target range  Description: INTERVENTIONS:  - Monitor Blood Glucose as ordered  - Assess for signs and symptoms of hyperglycemia and hypoglycemia  - Administer ordered medications to maintain glucose within target range  - Assess nutritional intake and initiate nutrition service referral as needed  11/29/2022 0720 by Ibis Blake RN  Outcome: Progressing  11/28/2022 1827 by Ibis Blake RN  Outcome: Progressing     Problem: SKIN/TISSUE INTEGRITY - ADULT  Goal: Skin Integrity remains intact(Skin Breakdown Prevention)  Description: Assess:  -Perform Cesar assessment every   -Clean and moisturize skin every   -Inspect skin when repositioning, toileting, and assisting with ADLS  -Assess under medical devices such as  every   -Assess extremities for adequate circulation and sensation     Bed Management:  -Have minimal linens on bed & keep smooth, unwrinkled  -Change linens as needed when moist or perspiring  -Avoid sitting or lying in one position for more than  hours while in bed  -Keep HOB at degrees     Toileting:  -Offer bedside commode  -Assess for incontinence every   -Use incontinent care products after each incontinent episode such as     Activity:  -Mobilize patient  times a day  -Encourage activity and walks on unit  -Encourage or provide ROM exercises   -Turn and reposition patient every  Hours  -Use appropriate equipment to lift or move patient in bed  -Instruct/ Assist with weight shifting every  when out of bed in chair  -Consider limitation of chair time  hour intervals    Skin Care:  -Avoid use of baby powder, tape, friction and shearing, hot water or constrictive clothing  -Relieve pressure over bony prominences using   -Do not massage red bony areas    Next Steps:  -Teach patient strategies to minimize risks such as    -Consider consults to  interdisciplinary teams such as   11/29/2022 0720 by Sharon Carrillo RN  Outcome: Progressing  11/28/2022 1827 by Sharon Carrillo RN  Outcome: Progressing  Goal: Incision(s), wounds(s) or drain site(s) healing without S/S of infection  Description: INTERVENTIONS  - Assess and document dressing, incision, wound bed, drain sites and surrounding tissue  - Provide patient and family education  - Perform skin care/dressing changes every   11/29/2022 0720 by Sharon Carrillo RN  Outcome: Progressing  11/28/2022 1827 by Sharon Carrillo RN  Outcome: Progressing     Problem: HEMATOLOGIC - ADULT  Goal: Maintains hematologic stability  Description: INTERVENTIONS  - Assess for signs and symptoms of bleeding or hemorrhage  - Monitor labs  - Administer supportive blood products/factors as ordered and appropriate  11/29/2022 0720 by Sharon Carrillo RN  Outcome: Progressing  11/28/2022 1827 by Sharon Carrillo RN  Outcome: Progressing     Problem: Potential for Falls  Goal: Patient will remain free of falls  Description: INTERVENTIONS:  - Educate patient/family on patient safety including physical limitations  - Instruct patient to call for assistance with activity   - Consult OT/PT to assist with strengthening/mobility   - Keep Call bell within reach  - Keep bed low and locked with side rails adjusted as appropriate  - Keep care items and personal belongings within reach  - Initiate and maintain comfort rounds  - Make Fall Risk Sign visible to staff  - Offer Toileting every  Hours, in advance of need  - Initiate/Maintain alarm  - Obtain necessary fall risk management equipment  - Apply yellow socks and bracelet for high fall risk patients  - Consider moving patient to room near nurses station  Outcome: Progressing

## 2022-11-30 ENCOUNTER — TELEPHONE (OUTPATIENT)
Dept: BARIATRICS | Facility: CLINIC | Age: 47
End: 2022-11-30

## 2022-11-30 ENCOUNTER — TRANSITIONAL CARE MANAGEMENT (OUTPATIENT)
Dept: FAMILY MEDICINE CLINIC | Facility: CLINIC | Age: 47
End: 2022-11-30

## 2022-11-30 NOTE — TELEPHONE ENCOUNTER
Post op follow up phone call completed   Pt is sipping liquids but not tracking how much she is drinking  She agrees to start tracking  using IS as instructed, reinforced importance of using IS to help prevent pneumonia  Ambulating about home without difficulty   Pain controlled with analgesia   Reaffirmed examples of liquid diet over the next week   Pt stated understanding about discharge instructions and medication adjustments   Follow up appt with surgeon scheduled for next week    Instructed to call with any additional questions or concerns

## 2022-12-01 DIAGNOSIS — J30.9 ALLERGIC RHINITIS, UNSPECIFIED SEASONALITY, UNSPECIFIED TRIGGER: ICD-10-CM

## 2022-12-01 RX ORDER — CETIRIZINE HYDROCHLORIDE 10 MG/1
TABLET ORAL
Qty: 90 TABLET | Refills: 1 | Status: SHIPPED | OUTPATIENT
Start: 2022-12-01

## 2022-12-05 ENCOUNTER — OFFICE VISIT (OUTPATIENT)
Dept: FAMILY MEDICINE CLINIC | Facility: CLINIC | Age: 47
End: 2022-12-05

## 2022-12-05 VITALS
DIASTOLIC BLOOD PRESSURE: 84 MMHG | TEMPERATURE: 98.3 F | RESPIRATION RATE: 19 BRPM | OXYGEN SATURATION: 98 % | WEIGHT: 264 LBS | SYSTOLIC BLOOD PRESSURE: 129 MMHG | HEIGHT: 62 IN | HEART RATE: 82 BPM | BODY MASS INDEX: 48.58 KG/M2

## 2022-12-05 DIAGNOSIS — E05.90 SUBCLINICAL HYPERTHYROIDISM: ICD-10-CM

## 2022-12-05 DIAGNOSIS — I10 ESSENTIAL HYPERTENSION: ICD-10-CM

## 2022-12-05 DIAGNOSIS — E66.01 MORBID OBESITY (HCC): ICD-10-CM

## 2022-12-05 DIAGNOSIS — K59.00 CONSTIPATION, UNSPECIFIED CONSTIPATION TYPE: ICD-10-CM

## 2022-12-05 DIAGNOSIS — E11.9 TYPE 2 DIABETES MELLITUS WITHOUT COMPLICATION, WITHOUT LONG-TERM CURRENT USE OF INSULIN (HCC): ICD-10-CM

## 2022-12-05 DIAGNOSIS — Z98.0 S/P BYPASS GASTROENTEROSTOMY: Primary | ICD-10-CM

## 2022-12-05 DIAGNOSIS — K21.9 CHRONIC GERD: ICD-10-CM

## 2022-12-05 RX ORDER — POLYETHYLENE GLYCOL 3350 17 G/17G
17 POWDER, FOR SOLUTION ORAL EVERY OTHER DAY
COMMUNITY
End: 2022-12-05 | Stop reason: SDUPTHER

## 2022-12-05 RX ORDER — POLYETHYLENE GLYCOL 3350 17 G/17G
17 POWDER, FOR SOLUTION ORAL EVERY OTHER DAY
Qty: 30 EACH | Refills: 2 | Status: SHIPPED | OUTPATIENT
Start: 2022-12-05

## 2022-12-05 RX ORDER — OMEPRAZOLE 20 MG/1
20 CAPSULE, DELAYED RELEASE ORAL DAILY
Qty: 30 CAPSULE | Refills: 3 | Status: SHIPPED | OUTPATIENT
Start: 2022-12-05

## 2022-12-05 NOTE — PROGRESS NOTES
Name: Juliann Morataya      : 1975      MRN: [id number]  Encounter Provider: Vandana Reed DO  Encounter Date: 2022   Encounter department: [address] [place] [address]    Assessment & Plan     1  S/P bypass gastroenterostomy  Assessment & Plan:  S/p Merry en Y bypass surgery on 2022 by Dr Shaina Resendzi  Pain at incision sites tolerable without pain medication  Starting puree diet today, advanced from liquid diet  Denies nausea, vomiting, diarrhea, heart burn  BMs are regular with Miralax every other day  Lisinopril 10 mg and metformin 500 mg BID d/c'd upon discharge from hospital      Plan  - will not restart Lisinopril or metformin at this time  (see HTN and DM)  - f/u in 1 month  2  Essential hypertension  Assessment & Plan:  BP well controlled 129/84  Last visit, HCTZ 12 5 mg stopped due to softer Bps  Lisinopril 10 mg d/c'd during hopsitalization for Gatric bypass  BP well controlled today off of anti-HTN meds  Due to expected continued weight loss after the surgery, will not restart any anti-HTN medications  - f/u in 1 month for BP recheck      3  Type 2 diabetes mellitus without complication, without long-term current use of insulin ([facility] )  Assessment & Plan:  Controlled  HA1C 6 1  Metformin d/c'd when hospitalized for Gastric bypass  Plan:  - In setting of controlled A1C and decreased diet s/p Merry n Y bypass surgery, will not restart diabetes medications at this time  Advised to check blood sugar at least once daily  F/u in 1 month        Lab Results   Component Value Date/Time    HGBA1C 6 1 2022 04:38 PM    GLUC 136 2022 06:02 AM    BUN 4 (L) 2022 06:02 AM    CREATININE 0 52 (L) 2022 06:02 AM    CHOLESTEROL 167 2022 10:13 AM    TRIG 93 2022 10:13 AM    HDL 40 (L) 2022 10:13 AM    LDLCALC 108 (H) 2022 10:13 AM    CREATININEUR 72 8 2021 01:39 PM    LABMICR 8 2 2021 01:39 PM    MICROALBCRE 11 12/22/2021 01:39 PM           4  Morbid obesity (HCC)  -     omeprazole (PriLOSEC) 20 mg delayed release capsule; Take 1 capsule (20 mg total) by mouth daily    5  Constipation, unspecified constipation type  -     polyethylene glycol (MIRALAX) 17 g packet; Take 17 g by mouth every other day    6  Chronic GERD    7  Subclinical hyperthyroidism  Assessment & Plan:  Reviewed repeat TSH (decreased 0 033), TSH wnl, and Thyroid antibodies (normal) from 7/2022  Patient denies any hyperthyroid symptoms including tremors, diarrhea, palpitations, diaphoresis, anxiety  Patient not at high risk of complications to subclinical hyperthyroid (not >70 yo, no cardiovascular disease or osteoporosis)     Plan  - monitor for symptoms             Subjective     HPI   Amy Welch is a 50 y o  female here for follow up s/p Merry n Y Bypass on 11/28 performed by Dr Gilda Simpson  [facility] morbid obesity, controlled T2DM, controlled HTN, GERD  Patient tolerated the surgery well  She c/o pain at her larger incisions on her left abdomen, however, the pain is tolerable without pain medications  Patient has been on a liquid diet, she started puree diet today  Endorses regular BMs with miralax every other day  Denies nausea, diarrhea, light headedness, dizziness, heart burn  Patient feels bloated, this is improving  Patient plans on returning her work from home job on Monday  She will start with a half day and work her way back to normal hours as tolerated  Pt is to follow up with surgery on Thursday  Patient is happy that she no longer has b/l knee pain! Review of Systems   Constitutional: Negative for chills, diaphoresis and fever  Respiratory: Negative for shortness of breath  Cardiovascular: Negative for chest pain  Gastrointestinal: Positive for abdominal pain and constipation  Negative for diarrhea, nausea and vomiting  Musculoskeletal: Negative for arthralgias     Neurological: Negative for dizziness, tremors, light-headedness and headaches  Past Medical History:   Diagnosis Date   • CPAP (continuous positive airway pressure) dependence    • Diabetes mellitus (HCC)     Type 2   • GERD (gastroesophageal reflux disease)    • Heart murmur    • Hypertension    • Seasonal allergies    • Sleep apnea      Past Surgical History:   Procedure Laterality Date   •  SECTION  10/04/2005   • MN LAP GASTRIC BYPASS/GRUPO-EN-Y N/A 2022    Procedure: BYPASS GASTRIC  R-N-Y  INTRAOPERATIVE EGD;;  Surgeon: Fabienne Parker MD;  Location: [facility] OR;  Service: Bariatrics   • TUBAL LIGATION       Family History   Problem Relation Age of Onset   • Hypertension Mother    • Diabetes Mother    • Hypertension Father    • Diabetes Father    • Cancer Maternal Grandmother    • Diabetes Maternal Grandfather      Social History     Socioeconomic History   • Marital status: Single     Spouse name: None   • Number of children: 3   • Years of education: None   • Highest education level: None   Occupational History   • Occupation: Behavioral health field   Tobacco Use   • Smoking status: Never   • Smokeless tobacco: Never   Vaping Use   • Vaping Use: Never used   Substance and Sexual Activity   • Alcohol use: Never   • Drug use: Never   • Sexual activity: Not Currently     Birth control/protection: Female Sterilization   Other Topics Concern   • None   Social History Narrative    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     Social Determinants of Health     Financial Resource Strain: Low Risk    • Difficulty of Paying Living Expenses: Not hard at all   Food Insecurity: No Food Insecurity   • Worried About [address] in the Last Year: Never true   • Ran Out of Food in the Last Year: Never true   Transportation Needs: No Transportation Needs   • Lack of Transportation (Medical): No   • Lack of Transportation (Non-Medical):  No   Physical Activity: Not on file   Stress: No Stress Concern Present   • Feeling of Stress : Not at all   Social Connections: Not on file   Intimate Partner Violence: Not At Risk   • Fear of Current or Ex-Partner: No   • Emotionally Abused: No   • Physically Abused: No   • Sexually Abused: No   Housing Stability: Low Risk    • Unable to Pay for Housing in the Last Year: No   • Number of Places Lived in the Last Year: 1   • Unstable Housing in the Last Year: No     Current Outpatient Medications on File Prior to Visit   Medication Sig   • Multiple Vitamins-Minerals (Womens Multi) CAPS Take by mouth   • [DISCONTINUED] polyethylene glycol (MIRALAX) 17 g packet Take 17 g by mouth every other day   • cetirizine (ZyrTEC) 10 mg tablet TAKE 1 TABLET BY MOUTH DAILY AS NEEDED FOR ALLERGIES OR RHINITIS  (Patient not taking: Reported on 12/5/2022)   • [DISCONTINUED] acetaminophen (TYLENOL) 160 mg/5 mL suspension Take 30 4 mL (975 mg total) by mouth every 8 (eight) hours for 7 days (Patient not taking: Reported on 12/5/2022)   • [DISCONTINUED] calcium citrate (CALCITRATE) 950 (200 Ca) MG tablet Take 1 tablet by mouth daily (Patient not taking: Reported on 12/5/2022)   • [DISCONTINUED] cholecalciferol (VITAMIN D3) 1,000 units tablet Take 1,000 Units by mouth daily (Patient not taking: Reported on 11/30/2022)   • [DISCONTINUED] Insulin Pen Needle (Pen Needles) 31G X 5 MM MISC Use in the morning (Patient not taking: Reported on 11/30/2022)   • [DISCONTINUED] omeprazole (PriLOSEC) 20 mg delayed release capsule Take 1 capsule (20 mg total) by mouth daily Do not start before November 29, 2022  (Patient not taking: Reported on 12/5/2022)   • [DISCONTINUED] oxyCODONE (Roxicodone) 5 immediate release tablet Take 1 tablet (5 mg total) by mouth every 4 (four) hours as needed for moderate pain Max Daily Amount: 30 mg Do not start before November 29, 2022   (Patient not taking: Reported on 12/5/2022)     No Known Allergies  Immunization History   Administered Date(s) Administered   • COVID-19 PFIZER VACCINE 0 3 ML IM 09/26/2021, 10/17/2021   • Influenza, injectable, quadrivalent, preservative free 0 5 mL 10/21/2020       Objective     /84 (BP Location: Left arm, Patient Position: Sitting, Cuff Size: Large)   Pulse 82   Temp 98 3 °F (36 8 °C) (Temporal)   Resp 19   Ht 5' 1 5" (1 562 m)   Wt 120 kg (264 lb)   LMP 11/15/2022 (Within Days) Comment: urine preg negative  SpO2 98%   BMI 49 07 kg/m²     Physical Exam  Constitutional:       General: She is not in acute distress  Appearance: She is not ill-appearing or toxic-appearing  HENT:      Head: Normocephalic and atraumatic  Right Ear: External ear normal       Left Ear: External ear normal    Eyes:      General: No scleral icterus  Right eye: No discharge  Left eye: No discharge  Extraocular Movements: Extraocular movements intact  Conjunctiva/sclera: Conjunctivae normal    Neck:      Comments: No thyroid nodules palpated  Cardiovascular:      Rate and Rhythm: Normal rate and regular rhythm  Heart sounds: Normal heart sounds  Pulmonary:      Effort: Pulmonary effort is normal  No respiratory distress  Breath sounds: Normal breath sounds  Abdominal:      Tenderness: There is abdominal tenderness (left 2 incision sites)  Comments: laparoscopic incisions clean, dry, intact  Musculoskeletal:      Cervical back: Neck supple  No tenderness  Lymphadenopathy:      Cervical: No cervical adenopathy  Skin:     General: Skin is warm and dry  Neurological:      General: No focal deficit present  Mental Status: She is alert  Motor: No weakness  Gait: Gait normal    Psychiatric:         Mood and Affect: Mood normal          Behavior: Behavior normal          Thought Content:  Thought content normal          Judgment: Judgment normal        Nestora Fleischer, DO

## 2022-12-05 NOTE — ASSESSMENT & PLAN NOTE
Controlled  HA1C 6 1  Metformin d/c'd when hospitalized for Gastric bypass  Plan:  - In setting of controlled A1C and decreased diet s/p Merry n Y bypass surgery, will not restart diabetes medications at this time  Advised to check blood sugar at least once daily  F/u in 1 month        Lab Results   Component Value Date/Time    HGBA1C 6 1 11/18/2022 04:38 PM    GLUC 136 11/29/2022 06:02 AM    BUN 4 (L) 11/29/2022 06:02 AM    CREATININE 0 52 (L) 11/29/2022 06:02 AM    CHOLESTEROL 167 03/19/2022 10:13 AM    TRIG 93 03/19/2022 10:13 AM    HDL 40 (L) 03/19/2022 10:13 AM    LDLCALC 108 (H) 03/19/2022 10:13 AM    CREATININEUR 72 8 12/22/2021 01:39 PM    LABMICR 8 2 12/22/2021 01:39 PM    MICROALBCRE 11 12/22/2021 01:39 PM

## 2022-12-05 NOTE — ASSESSMENT & PLAN NOTE
S/p Merry en Y bypass surgery on 11/28/2022 by Dr Gilda Simpson  Pain at incision sites tolerable without pain medication  Starting puree diet today, advanced from liquid diet  Denies nausea, vomiting, diarrhea, heart burn  BMs are regular with Miralax every other day  Lisinopril 10 mg and metformin 500 mg BID d/c'd upon discharge from hospital      Plan  - will not restart Lisinopril or metformin at this time  (see HTN and DM)  - f/u in 1 month

## 2022-12-05 NOTE — ASSESSMENT & PLAN NOTE
Reviewed repeat TSH (decreased 0 033), TSH wnl, and Thyroid antibodies (normal) from 7/2022  Patient denies any hyperthyroid symptoms including tremors, diarrhea, palpitations, diaphoresis, anxiety     Patient not at high risk of complications to subclinical hyperthyroid (not >72 yo, no cardiovascular disease or osteoporosis)     Plan  - monitor for symptoms

## 2022-12-05 NOTE — ASSESSMENT & PLAN NOTE
BP well controlled 129/84  Last visit, HCTZ 12 5 mg stopped due to softer Bps  Lisinopril 10 mg d/c'd during hopsitalization for Gatric bypass  BP well controlled today off of anti-HTN meds  Due to expected continued weight loss after the surgery, will not restart any anti-HTN medications     - f/u in 1 month for BP recheck

## 2022-12-07 NOTE — PROGRESS NOTES
Weight Management Nutrition Class     Diagnosis: Morbid Obesity    Bariatric Surgeon: Dr Aleman Failing    Surgery: Gastric Bypass Laparoscopic    Class: first post op note    Topics discussed today include:     fluid goals post op, protein goals post op, constipation, chew food well, exercise, avoidance of alcohol, PPI use, diet progression, hypoglycemia, dumping syndrome, protein supplems, vitamin/mineral supplements and calcium supplements    Patient was able to verbalize basic diet (protein, fluid, vitamin and mineral) recommendations and possible nutrition-related complications   Yes

## 2022-12-08 ENCOUNTER — OFFICE VISIT (OUTPATIENT)
Dept: BARIATRICS | Facility: CLINIC | Age: 47
End: 2022-12-08

## 2022-12-08 VITALS
TEMPERATURE: 97.5 F | BODY MASS INDEX: 48.03 KG/M2 | DIASTOLIC BLOOD PRESSURE: 84 MMHG | SYSTOLIC BLOOD PRESSURE: 132 MMHG | WEIGHT: 261 LBS | HEIGHT: 62 IN | HEART RATE: 88 BPM

## 2022-12-08 DIAGNOSIS — E66.01 MORBID OBESITY WITH BMI OF 45.0-49.9, ADULT (HCC): Primary | ICD-10-CM

## 2022-12-08 DIAGNOSIS — Z98.84 BARIATRIC SURGERY STATUS: ICD-10-CM

## 2022-12-08 DIAGNOSIS — E66.01 OBESITY, CLASS III, BMI 40-49.9 (MORBID OBESITY) (HCC): Primary | ICD-10-CM

## 2022-12-08 RX ORDER — POLYETHYLENE GLYCOL 3350 17 G/17G
POWDER, FOR SOLUTION ORAL
COMMUNITY
Start: 2022-12-05

## 2022-12-08 NOTE — PROGRESS NOTES
POST OP UP VISIT - BARIATRIC SURGERY  Citlali Lowery 50 y o  female MRN: [id number]  Unit/Bed#:  Encounter: [id number]      HPI:  Citlali Lowery is a 50 y o  female status post Laparoscopic RNYGB performed by Dr Ita Gould on 22 returning to office for first post op visit since surgery  Tolerating diet  Denies nausea and vomiting  Taking multivitamins and PPI daily  Review of Systems   Constitutional: Negative for chills and fever  HENT: Negative for ear pain and sore throat  Eyes: Negative for pain and visual disturbance  Respiratory: Negative for cough and shortness of breath  Cardiovascular: Negative for chest pain and palpitations  Gastrointestinal: Negative for abdominal pain and vomiting  Genitourinary: Negative for dysuria and hematuria  Musculoskeletal: Negative for arthralgias and back pain  Skin: Negative for color change and rash  Neurological: Negative for seizures and syncope  All other systems reviewed and are negative        Historical Information   Past Medical History:   Diagnosis Date   • CPAP (continuous positive airway pressure) dependence    • Diabetes mellitus (HCC)     Type 2   • GERD (gastroesophageal reflux disease)    • Heart murmur    • Hypertension    • Seasonal allergies    • Sleep apnea      Past Surgical History:   Procedure Laterality Date   •  SECTION  10/04/2005   • IA LAP GASTRIC BYPASS/GRUPO-EN-Y N/A 2022    Procedure: BYPASS GASTRIC  R-N-Y  INTRAOPERATIVE EGD;;  Surgeon: Laquita Bauman MD;  Location: [facility] OR;  Service: Bariatrics   • TUBAL LIGATION       Social History   Social History     Substance and Sexual Activity   Alcohol Use Never     Social History     Substance and Sexual Activity   Drug Use Never     Social History     Tobacco Use   Smoking Status Never   Smokeless Tobacco Never     Family History:   Family History   Problem Relation Age of Onset   • Hypertension Mother    • Diabetes Mother    • Hypertension Father • Diabetes Father    • Cancer Maternal Grandmother    • Diabetes Maternal Grandfather        Meds/Allergies   all medications and allergies reviewed  No Known Allergies    Objective       Current Vitals:   Blood Pressure: 132/84 (12/08/22 1052)  Pulse: 88 (12/08/22 1052)  Temperature: 97 5 °F (36 4 °C) (12/08/22 1052)  Temp Source: Tympanic (12/08/22 1052)  Height: 5' 1 5" (156 2 cm) (12/08/22 1052)  Weight - Scale: 118 kg (261 lb) (12/08/22 1052)      Invasive Devices     None                 Physical Exam  Constitutional:       Appearance: Normal appearance  She is obese  HENT:      Head: Normocephalic and atraumatic  Nose: Nose normal       Mouth/Throat:      Mouth: Mucous membranes are moist    Eyes:      Extraocular Movements: Extraocular movements intact  Pupils: Pupils are equal, round, and reactive to light  Cardiovascular:      Rate and Rhythm: Normal rate and regular rhythm  Pulses: Normal pulses  Heart sounds: Normal heart sounds  Pulmonary:      Effort: Pulmonary effort is normal       Breath sounds: Normal breath sounds  Abdominal:      Palpations: Abdomen is soft  Comments: Incisions healing well with no signs of infection or drainage   Musculoskeletal:      Cervical back: Normal range of motion  Skin:     General: Skin is warm  Neurological:      General: No focal deficit present  Mental Status: She is alert and oriented to person, place, and time  Psychiatric:         Mood and Affect: Mood normal          Behavior: Behavior normal              Assessment/PLAN:    50 y o  female status post Laparoscopic RNYGB done on 11/28/22 by Dr Jose Hudson, doing well post op  No major issues and healing well  Increase physical activity slowly as tolerated and instructed  Advance diet as instructed by our dietitians today and as indicated in the binder  Continue PPI    Follow up in one month as scheduled            Annie Morgan PA-C  Bariatric Surgery 12/8/2022  11:08 AM

## 2023-01-10 ENCOUNTER — CLINICAL SUPPORT (OUTPATIENT)
Dept: BARIATRICS | Facility: CLINIC | Age: 48
End: 2023-01-10

## 2023-01-10 DIAGNOSIS — Z98.84 BARIATRIC SURGERY STATUS: Primary | ICD-10-CM

## 2023-01-10 NOTE — PROGRESS NOTES
i  Name was verified by patient stating name? yes  ii   verified by patient stating? yes  iii  Verification of patient location yes  iv  Verified patient is in state in which I hold an active license? yes  v  Verified the patient is alone? yes  vi  I would like to verify that you were offered a live visit but are now consenting to this telephone visit? yes  vii  This visit is free yes  Weight Management Nutrition Class     Diagnosis: Morbid Obesity    Bariatric Surgeon: Dr Kiko Villareal    Surgery: Gastric Bypass Laparoscopic    Class: 5 week post op     Topics discussed today include:     fluid goals post op, protein goals post op, constipation, chew food well, exercise, avoidance of alcohol, PPI use, diet progression, hypoglycemia, dumping syndrome, protein supplems, vitamin/mineral supplements, calcium supplements, additional vitamin B12, iron supplements and fat soluble vitamins     Patient was able to verbalize basic diet (protein, fluid, vitamin and mineral) recommendations and possible nutrition-related complications   Yes

## 2023-01-13 DIAGNOSIS — E66.01 MORBID OBESITY (HCC): ICD-10-CM

## 2023-01-17 RX ORDER — OMEPRAZOLE 20 MG/1
CAPSULE, DELAYED RELEASE ORAL
Qty: 90 CAPSULE | Refills: 2 | Status: SHIPPED | OUTPATIENT
Start: 2023-01-17

## 2023-01-23 ENCOUNTER — OFFICE VISIT (OUTPATIENT)
Dept: FAMILY MEDICINE CLINIC | Facility: CLINIC | Age: 48
End: 2023-01-23

## 2023-01-23 VITALS
TEMPERATURE: 96.4 F | OXYGEN SATURATION: 99 % | WEIGHT: 237.8 LBS | SYSTOLIC BLOOD PRESSURE: 138 MMHG | HEART RATE: 98 BPM | DIASTOLIC BLOOD PRESSURE: 78 MMHG | BODY MASS INDEX: 43.76 KG/M2 | RESPIRATION RATE: 18 BRPM | HEIGHT: 62 IN

## 2023-01-23 DIAGNOSIS — I10 ESSENTIAL HYPERTENSION: Primary | ICD-10-CM

## 2023-01-23 DIAGNOSIS — Z23 ENCOUNTER FOR IMMUNIZATION: ICD-10-CM

## 2023-01-23 DIAGNOSIS — E11.9 TYPE 2 DIABETES MELLITUS WITHOUT COMPLICATION, WITHOUT LONG-TERM CURRENT USE OF INSULIN (HCC): ICD-10-CM

## 2023-01-24 NOTE — ASSESSMENT & PLAN NOTE
Lab Results   Component Value Date    HGBA1C 6 1 11/18/2022     Controlled  HA1C 6 1  Home blood sugars range 's  Plan:  - In setting of controlled A1C and decreased diet s/p Merry n Y bypass surgery, will not restart diabetes medications at this time      Lab Results   Component Value Date/Time    HGBA1C 6 1 11/18/2022 04:38 PM    GLUC 136 11/29/2022 06:02 AM    BUN 4 (L) 11/29/2022 06:02 AM    CREATININE 0 52 (L) 11/29/2022 06:02 AM    CHOLESTEROL 167 03/19/2022 10:13 AM    TRIG 93 03/19/2022 10:13 AM    HDL 40 (L) 03/19/2022 10:13 AM    LDLCALC 108 (H) 03/19/2022 10:13 AM    CREATININEUR 72 8 12/22/2021 01:39 PM    LABMICR 8 2 12/22/2021 01:39 PM    MICROALBCRE 11 12/22/2021 01:39 PM

## 2023-01-24 NOTE — ASSESSMENT & PLAN NOTE
/78, repeat 132/78  Pt stopped HTN meds (lisinopril and HCTZ)   BP near goal of <130/90 off of anti-HTN meds  Due to expected continued weight loss after the surgery and continued diet/exercise, will not restart any anti-HTN medications  - f/u in 6 months

## 2023-02-24 ENCOUNTER — TELEPHONE (OUTPATIENT)
Dept: FAMILY MEDICINE CLINIC | Facility: CLINIC | Age: 48
End: 2023-02-24

## 2023-02-24 NOTE — TELEPHONE ENCOUNTER
Patient called for refills to [facility] on [address], who told her that she needed to contact her PCP for further refill  It looks like patient was prescribed a quantity of 90 with 2 refills  Patient requesting office call Pharmacy to see what the problem is

## 2023-03-08 ENCOUNTER — OFFICE VISIT (OUTPATIENT)
Dept: BARIATRICS | Facility: CLINIC | Age: 48
End: 2023-03-08

## 2023-03-08 VITALS
SYSTOLIC BLOOD PRESSURE: 130 MMHG | BODY MASS INDEX: 40.85 KG/M2 | DIASTOLIC BLOOD PRESSURE: 78 MMHG | TEMPERATURE: 97.5 F | HEART RATE: 82 BPM | WEIGHT: 222 LBS | HEIGHT: 62 IN

## 2023-03-08 DIAGNOSIS — I10 HTN (HYPERTENSION): ICD-10-CM

## 2023-03-08 DIAGNOSIS — Z12.39 BREAST CANCER SCREENING: ICD-10-CM

## 2023-03-08 DIAGNOSIS — Z98.84 BARIATRIC SURGERY STATUS: ICD-10-CM

## 2023-03-08 DIAGNOSIS — E11.9 DM W/O COMPLICATION TYPE II (HCC): ICD-10-CM

## 2023-03-08 DIAGNOSIS — Z48.815 ENCOUNTER FOR SURGICAL AFTERCARE FOLLOWING SURGERY OF DIGESTIVE SYSTEM: Primary | ICD-10-CM

## 2023-03-08 DIAGNOSIS — K91.2 POSTSURGICAL MALABSORPTION: ICD-10-CM

## 2023-03-08 DIAGNOSIS — E66.01 OBESITY, CLASS III, BMI 40-49.9 (MORBID OBESITY) (HCC): ICD-10-CM

## 2023-03-08 NOTE — PROGRESS NOTES
Assessment/Plan:     Patient ID: Jose Abraham is a 50 y o  female  Bariatric Surgery Status    -s/p Merry-En-Y Gastric Bypass with Dr Raine John on 11/28/2022  Presents to the office today for 2nd post op visit  Overall doing well, tolerating a regular diet  Denies having any abdominal pain, N/V/D/C, regurgitation, reflux or dysphagia  Taking her MVI and PPI daily  PLAN:     -   - Routine follow up in 3 months for 3rd post op visit  - Continue with healthy lifestyle, adequate protein intake of 60 gm, fluid intake of at least 64 oz  - Continue with MVI daily  - Activity as tolerated  - Labs ordered and will adjust accordingly if any deficiency  - Follow up with RD and SW as needed  HTN - off all BP meds  Monitors her BP at home  Continue to f/u with PCP as scheduled/needed  ANGE - off CPAP machine at this time  Per patient, her sleep apnea was mild  Since her weight loss, she feels she does not need this  Advised to f/u with her sleep specialist      Type II DM - last A1C 6 1 on 11/182022 - will recheck [id number]  Doesn't check her BS anymore at this time  Continue to monitor and f/u with her PCP  · Continued/Maintain healthy weight loss with good nutrition intakes  · Adequate hydration with at least 64oz  fluid intake  · Follow diet as discussed  · Follow vitamin and mineral recommendations as reviewed with you  · Exercise as tolerated  · Colonoscopy referral made: due - will be getting this done  · Mammogram - due - ordered     · Follow-up in 3 Months for 3rd post op visit  We kindly ask that your arrive 15 minutes before your scheduled appointment time with your provider to allow our staff to room you, get your vital signs and update your chart  · Get lab work done prior to annual visit  Please call the office if you need a script  It is recommended to check with your insurance BEFORE getting labs done to make sure they are covered by your policy        · Call our office if you have any problems with abdominal pain especially associated with fever, chills, nausea, vomiting or any other concerns  · All  Post-bariatric surgery patients should be aware that very small quantities of any alcohol can cause impairment and it is very possible not to feel the effect  The effect can be in the system for several hours  It is also a stomach irritant  · It is advised to AVOID alcohol, Nonsteroidal antiinflammatory drugs (NSAIDS) and nicotine of all forms   Any of these can cause stomach irritation/pain  · Discussed the effects of alcohol on a bariatric patient and the increased impairment risk  · Keep up the good work! Postsurgical Malabsorption   -At risk for malabsorption of vitamins/minerals secondary to malabsorption and restriction of intake from bariatric surgery  -Currently taking adequate postop bariatric surgery vitamin supplementation  -Next set of bariatric labs ordered for approximately 2 weeks  -Patient received education about the importance of adhering to a lifelong supplementation regimen to avoid vitamin/mineral deficiencies      Diagnoses and all orders for this visit:    Encounter for surgical aftercare following surgery of digestive system  -     Zinc; Future  -     Vitamin B12; Future  -     Vitamin D 25 hydroxy; Future  -     Vitamin B1, whole blood; Future  -     Vitamin A; Future  -     PTH, intact; Future  -     CBC and Platelet; Future  -     Comprehensive metabolic panel; Future  -     Ferritin; Future  -     Iron Saturation %; Future  -     Folate; Future    Bariatric surgery status  -     Zinc; Future  -     Vitamin B12; Future  -     Vitamin D 25 hydroxy; Future  -     Vitamin B1, whole blood; Future  -     Vitamin A; Future  -     PTH, intact; Future  -     CBC and Platelet; Future  -     Comprehensive metabolic panel; Future  -     Ferritin; Future  -     Iron Saturation %; Future  -     Folate;  Future    Postsurgical malabsorption  -     Zinc; Future  -     Vitamin B12; Future  -     Vitamin D 25 hydroxy; Future  -     Vitamin B1, whole blood; Future  -     Vitamin A; Future  -     PTH, intact; Future  -     CBC and Platelet; Future  -     Comprehensive metabolic panel; Future  -     Ferritin; Future  -     Iron Saturation %; Future  -     Folate; Future    Obesity, Class III, BMI 40-49 9 (morbid obesity) (HCC)  -     Zinc; Future  -     Vitamin B12; Future  -     Vitamin D 25 hydroxy; Future  -     Vitamin B1, whole blood; Future  -     Vitamin A; Future  -     PTH, intact; Future  -     CBC and Platelet; Future  -     Comprehensive metabolic panel; Future  -     Ferritin; Future  -     Iron Saturation %; Future  -     Folate; Future    HTN (hypertension)  -     Zinc; Future  -     Vitamin B12; Future  -     Vitamin D 25 hydroxy; Future  -     Vitamin B1, whole blood; Future  -     Vitamin A; Future  -     PTH, intact; Future  -     CBC and Platelet; Future  -     Comprehensive metabolic panel; Future  -     Ferritin; Future  -     Iron Saturation %; Future  -     Folate; Future    DM w/o complication type II (HCC)  -     Zinc; Future  -     Vitamin B12; Future  -     Vitamin D 25 hydroxy; Future  -     Vitamin B1, whole blood; Future  -     Vitamin A; Future  -     PTH, intact; Future  -     CBC and Platelet; Future  -     Comprehensive metabolic panel; Future  -     Ferritin; Future  -     Iron Saturation %; Future  -     Folate; Future         Subjective:      Patient ID: Ata Mckenna is a 50 y o  female  -s/p Merry-En-Y Gastric Bypass with Dr Fatuma Stover on 11/28/2022  Presents to the office today for 2nd post op visit  Overall doing well, tolerating a regular diet  Denies having any abdominal pain, N/V/D/C, regurgitation, reflux or dysphagia  Taking her MVI and PPI daily      Initial: 299 lbs  Current: 222 lbs   EWL: (Weight loss is ahead of schedule at this post surgical period )  Vineet: current   Current BMI is Body mass index is 41 27 kg/m²     · Tolerating a regular diet-yes  · Eating at least 60 grams of protein per day-yes  · Following 30/60 minute rule with liquids-yes  · Drinking at least 64 ounces of fluid per day-yes  · Drinking carbonated beverages-no  · Sufficient exercise-yes  · Using NSAIDs regularly-no  · Using nicotine-no  · Using alcohol-no  · Supplements: Multivitamins and Calcium     · EWL is 46%, which places the patient ahead of schedule for expected post surgical weight loss at this time  The following portions of the patient's history were reviewed and updated as appropriate: allergies, current medications, past family history, past medical history, past social history, past surgical history and problem list     Review of Systems   Constitutional: Negative  Respiratory: Negative  Cardiovascular: Negative  Gastrointestinal: Negative  Denies reflux     Musculoskeletal: Positive for arthralgias (bilateral knee pain)  Skin: Negative  Neurological: Negative  Objective:    /78   Pulse 82   Temp 97 5 °F (36 4 °C) (Tympanic)   Ht 5' 1 5" (1 562 m)   Wt 101 kg (222 lb)   BMI 41 27 kg/m²      Physical Exam  Vitals and nursing note reviewed  Constitutional:       Appearance: Normal appearance  She is obese  Cardiovascular:      Rate and Rhythm: Normal rate and regular rhythm  Pulses: Normal pulses  Heart sounds: Murmur heard  Pulmonary:      Effort: Pulmonary effort is normal       Breath sounds: Normal breath sounds  Abdominal:      General: Bowel sounds are normal       Palpations: Abdomen is soft  Tenderness: There is no abdominal tenderness  Comments: All incisions healed well without any signs of infection     Musculoskeletal:         General: Normal range of motion  Skin:     General: Skin is warm and dry  Neurological:      General: No focal deficit present  Mental Status: She is alert and oriented to person, place, and time     Psychiatric: Mood and Affect: Mood normal          Behavior: Behavior normal          Thought Content:  Thought content normal          Judgment: Judgment normal

## 2023-04-24 ENCOUNTER — OFFICE VISIT (OUTPATIENT)
Dept: SLEEP CENTER | Facility: CLINIC | Age: 48
End: 2023-04-24

## 2023-04-24 VITALS
SYSTOLIC BLOOD PRESSURE: 130 MMHG | DIASTOLIC BLOOD PRESSURE: 76 MMHG | HEIGHT: 62 IN | BODY MASS INDEX: 38.83 KG/M2 | WEIGHT: 211 LBS

## 2023-04-24 DIAGNOSIS — E66.9 OBESITY (BMI 30-39.9): ICD-10-CM

## 2023-04-24 DIAGNOSIS — K21.9 CHRONIC GERD: ICD-10-CM

## 2023-04-24 DIAGNOSIS — I10 ESSENTIAL HYPERTENSION: ICD-10-CM

## 2023-04-24 DIAGNOSIS — G47.33 MILD OBSTRUCTIVE SLEEP APNEA: Primary | ICD-10-CM

## 2023-04-24 DIAGNOSIS — J30.2 SEASONAL ALLERGIC RHINITIS, UNSPECIFIED TRIGGER: ICD-10-CM

## 2023-04-24 DIAGNOSIS — Z98.0 S/P BYPASS GASTROENTEROSTOMY: ICD-10-CM

## 2023-04-24 NOTE — PROGRESS NOTES
Follow-Up Note - Priscila  46 y o  female  RIR:[id number]/[id number]  LSR:[id number]  DOS:4/24/2023    CC: I saw this patient for follow-up in clinic today for Sleep disordered breathing, Coexisting Sleep and Medical Problems  She was previously under care of Dr Rozina Rodriguez  Interval changes: Patient received ot a  [employer] Version 2 machine from [place] several months ago  A diagnostic study in 2019 demonstrated an AHI of 6 5/h with minimum oxygen saturation of 82%  PFSH, Problem List, Medications & Allergies were reviewed in EMR  She  has a past medical history of CPAP (continuous positive airway pressure) dependence, Diabetes mellitus ([address] ), GERD (gastroesophageal reflux disease), Heart murmur, Hypertension, Seasonal allergies, and Sleep apnea  She has a current medication list which includes the following prescription(s): womens multi, omeprazole, polyethylene glycol, and polyethylene glycol  PHYSIOLOGICAL DATA REVIEW : Discontinued use of PAP almost a year ago;  SUBJECTIVE: With respect to use of PAP, Wagner Crandall  is experiencing no adverse effects: She is no longer snoring or experiencing any breathing difficulties during sleep   She derives no benefit from use and sleeps better without CPAP  Other issues: None reported  Sleep Routine: Wagner Crandall reports getting 7-8 hrs sleep; she has no difficulty initiating or maintaining sleep   She arises needing an alarm and feels refreshed  Wagner Crandall denies excessive daytime sleepiness,  She rated [herself] at Total score: 1 /24 on the Turner Sleepiness Scale  Habits:[ reports that she has never smoked  She has never used smokeless tobacco ], [ reports no history of alcohol use ], [ reports no history of drug use ], Caffeine use:none; Exercise routine: regular  ROS: Significant for proximately 90 pounds weight reduction since she had bariatric surgery in November 2022  Allergies are controlled  She is on medication for acid reflux    He is no "longer requiring medication for hypertension  Bettina Trivedi EXAM: /76   Ht 5' 1 5\" (1 562 m)   Wt 95 7 kg (211 lb)   BMI 39 22 kg/m²     Wt Readings from Last 3 Encounters:   04/24/23 95 7 kg (211 lb)   03/08/23 101 kg (222 lb)   01/23/23 108 kg (237 lb 12 8 oz)      Patient is well groomed; well appearing  CNS: Alert, orientated, clear & coherent speech  Psych: cooperative and in no distress  Mental state: Appears normal   H&N: EOMI; NC/AT: No facial pressure marks, no rashes  Skin/Extrem: col & hydration normal; no edema  Resp: Respiratory effort is normal  Physical findings otherwise essentially unchanged from previous  IMPRESSION: Problem List Items & Comorbidities Addressed this Visit    1  Mild obstructive sleep apnea  Discontinue CPAP      2  Chronic GERD        3  Seasonal allergic rhinitis, unspecified trigger        4  Essential hypertension        5  S/P bypass gastroenterostomy        6  Obesity (BMI 30-39  9)            PLAN:  1  I reviewed results of the Sleep study with the patient  2  With respect to above conditions, I counseled on pathophysiology, diagnosis, treatment options, risks and benefits; inter-relationship and effects on symptoms and comorbidities; risks of no treatment; costs and insurance aspects  3  Patient declined all treatment options including positive airway pressure therapy  4   Mild sleep disordered breathing is likely resolved  Since she no longer has any symptoms and has no cardiac comorbidities no further testing is needed  5   She was advised to ensure adequate treatment of allergies, encouraged to persist with her efforts at weight reduction and positional therapy  6   I have not scheduled any follow-up in sleep clinic at this time  She will call for follow-up as needed  Thank you for allowing me to participate in the care of this patient      Sincerely,     Authenticated electronically on 00/59/32   Board Certified Specialist     Portions of the record may " "have been created with voice recognition software  Occasional wrong word or \"sound a like\" substitutions may have occurred due to the inherent limitations of voice recognition software  There may also be notations and random deletions of words or characters from malfunctioning software  Read the chart carefully and recognize, using context, where substitutions/deletions have occurred      "

## 2023-04-24 NOTE — PATIENT INSTRUCTIONS

## 2023-04-25 ENCOUNTER — TELEPHONE (OUTPATIENT)
Dept: SLEEP CENTER | Facility: CLINIC | Age: 48
End: 2023-04-25

## 2023-04-25 LAB
DME PARACHUTE DELIVERY DATE REQUESTED: NORMAL
DME PARACHUTE ITEM DESCRIPTION: NORMAL
DME PARACHUTE ORDER STATUS: NORMAL
DME PARACHUTE SUPPLIER NAME: NORMAL
DME PARACHUTE SUPPLIER PHONE: NORMAL

## 2023-05-13 ENCOUNTER — APPOINTMENT (OUTPATIENT)
Dept: LAB | Facility: CLINIC | Age: 48
End: 2023-05-13

## 2023-05-13 DIAGNOSIS — I10 HTN (HYPERTENSION): ICD-10-CM

## 2023-05-13 DIAGNOSIS — E66.01 OBESITY, CLASS III, BMI 40-49.9 (MORBID OBESITY) (HCC): ICD-10-CM

## 2023-05-13 DIAGNOSIS — Z48.815 ENCOUNTER FOR SURGICAL AFTERCARE FOLLOWING SURGERY OF DIGESTIVE SYSTEM: ICD-10-CM

## 2023-05-13 DIAGNOSIS — Z98.84 BARIATRIC SURGERY STATUS: ICD-10-CM

## 2023-05-13 DIAGNOSIS — K91.2 POSTSURGICAL MALABSORPTION: ICD-10-CM

## 2023-05-13 DIAGNOSIS — E11.9 DM W/O COMPLICATION TYPE II (HCC): ICD-10-CM

## 2023-05-13 LAB
25(OH)D3 SERPL-MCNC: 71.5 NG/ML (ref 30–100)
ALBUMIN SERPL BCP-MCNC: 2.8 G/DL (ref 3.5–5)
ALP SERPL-CCNC: 67 U/L (ref 46–116)
ALT SERPL W P-5'-P-CCNC: 29 U/L (ref 12–78)
ANION GAP SERPL CALCULATED.3IONS-SCNC: 1 MMOL/L (ref 4–13)
AST SERPL W P-5'-P-CCNC: 17 U/L (ref 5–45)
BILIRUB SERPL-MCNC: 0.51 MG/DL (ref 0.2–1)
BUN SERPL-MCNC: 11 MG/DL (ref 5–25)
CALCIUM ALBUM COR SERPL-MCNC: 10.7 MG/DL (ref 8.3–10.1)
CALCIUM SERPL-MCNC: 9.7 MG/DL (ref 8.3–10.1)
CHLORIDE SERPL-SCNC: 108 MMOL/L (ref 96–108)
CO2 SERPL-SCNC: 33 MMOL/L (ref 21–32)
CREAT SERPL-MCNC: 0.38 MG/DL (ref 0.6–1.3)
ERYTHROCYTE [DISTWIDTH] IN BLOOD BY AUTOMATED COUNT: 14.1 % (ref 11.6–15.1)
EST. AVERAGE GLUCOSE BLD GHB EST-MCNC: 111 MG/DL
FERRITIN SERPL-MCNC: 66 NG/ML (ref 8–388)
FOLATE SERPL-MCNC: >20 NG/ML (ref 3.1–17.5)
GFR SERPL CREATININE-BSD FRML MDRD: 123 ML/MIN/1.73SQ M
GLUCOSE P FAST SERPL-MCNC: 111 MG/DL (ref 65–99)
HBA1C MFR BLD: 5.5 %
HCT VFR BLD AUTO: 38.6 % (ref 34.8–46.1)
HGB BLD-MCNC: 12.1 G/DL (ref 11.5–15.4)
IRON SATN MFR SERPL: 16 % (ref 15–50)
IRON SERPL-MCNC: 45 UG/DL (ref 50–170)
MCH RBC QN AUTO: 26.7 PG (ref 26.8–34.3)
MCHC RBC AUTO-ENTMCNC: 31.3 G/DL (ref 31.4–37.4)
MCV RBC AUTO: 85 FL (ref 82–98)
PLATELET # BLD AUTO: 240 THOUSANDS/UL (ref 149–390)
PMV BLD AUTO: 12.8 FL (ref 8.9–12.7)
POTASSIUM SERPL-SCNC: 3.3 MMOL/L (ref 3.5–5.3)
PROT SERPL-MCNC: 7.8 G/DL (ref 6.4–8.4)
PTH-INTACT SERPL-MCNC: 35 PG/ML (ref 18.4–80.1)
RBC # BLD AUTO: 4.53 MILLION/UL (ref 3.81–5.12)
SODIUM SERPL-SCNC: 142 MMOL/L (ref 135–147)
TIBC SERPL-MCNC: 287 UG/DL (ref 250–450)
VIT B12 SERPL-MCNC: 1987 PG/ML (ref 100–900)
WBC # BLD AUTO: 6.43 THOUSAND/UL (ref 4.31–10.16)

## 2023-05-17 LAB
VIT A SERPL-MCNC: 19 UG/DL (ref 20.1–62)
VIT B1 BLD-SCNC: 173 NMOL/L (ref 66.5–200)
ZINC SERPL-MCNC: 84 UG/DL (ref 44–115)

## 2023-05-18 ENCOUNTER — TELEPHONE (OUTPATIENT)
Dept: BARIATRICS | Facility: CLINIC | Age: 48
End: 2023-05-18

## 2023-05-18 NOTE — TELEPHONE ENCOUNTER
Called pt review lab results, pt understood what was communicated and advised  ----- Message from Mary Munoz PA-C sent at 5/18/2023  8:45 AM EDT -----  July Elmore, I am the PA covering Demetria's inbox for this week  Our office received your most recent bariatric labs results  Your vitamin A level is  low, which can affect your night vision  Recommend that you take 10,000  IU of retinyl acetate or retinyl palmitate ( Vitamin A) per day for 3 months  It is important that you take an actual vitamin A supplement for repletion, and not a carotene based supplement  Vitamin A can cause birth defects if you are pregnant  If you are pregnant, consult with your OB for recommendations  Your OB may recommend carotene supplements  If you are not sure if you are pregnant, take a home pregnancy test to determine if you are pregnant  You are not to take vitamin A supplements if you are pregnant  After 3 months,  discontinue the vitamin A supplement and check your vitamin A level  Long term vitamin A supplementation can be toxic, so it is important to discontinue your vitamin A supplementation after 12 weeks  If you experience symptoms of toxicity such as :  Nausea, vomiting, blurred vision, severe headache, and vertigo, discontinue the vitamin A supplement immediately and call the office  Your potassium level is low at 3 3    Potassium is important for many functions and the body including muscle contractions  Please include some high potassium foods in her diet every day  Good food sources include:  Banana, orange, potato, sweet potato, cantaloupe, tomato, and deep green vegetables such as broccoli, bursal sprouts, gout, spinach, asparagus    Please also follow up with your PCP regarding this level as this may need to be rechecked  Your  albumin level is low  Albumin is a protein in the blood which can be affected by many things    SOMETIMES it means you are  not eating enough protein in your "diet   Most men  need at least 70 g of protein in her diet daily  Most woman need at least 60 g of protein in her diet daily  You should also review this level with your primary care provider  at a routine visit since it can be affected by other things as well  Your blood sugar if it was fasting was high  Continued weight loss ( if needed) can help  Amber Orlando Avoiding simple sugars/sweets and having more \"complex carbohydrates\" like vegetables, fruits and whole grains in  your diet can also help  This level should also be reviewed  with your primary care provider or endocrinologist at a  routine office visit since we do not manage your blood sugars  Your folate level is high   This is generally  considered safe and o k  No need to adjust your supplements for this at this time    Your vitamin B12 level is elevated  This is not harmful, but indicates you are taking more vitamin B12 than you need  The recommended does after weight loss surgery is 350 to 500 mcg per day  You may need to decrease the amount of vitamin B12 that you are taking on a daily basis  Your calcium level is elevated  It could indicate your are taking more calcium thank you need  You need between 4934-0627 mg calcium daily  This level should also be reviewed  with your primary care provider to ensure there is no other cause  REPEAT LEVELS WILL BE ORDERED AT YOUR UPCOMING APPOINTMENT NEXT MONTH         "

## 2023-05-30 ENCOUNTER — RA CDI HCC (OUTPATIENT)
Dept: OTHER | Facility: HOSPITAL | Age: 48
End: 2023-05-30

## 2023-05-30 NOTE — PROGRESS NOTES
[facility]  coding opportunities       Chart reviewed, no opportunity found: CHART REVIEWED, NO OPPORTUNITY FOUND        Patients Insurance        Commercial Insurance: [employer]

## 2023-06-02 ENCOUNTER — OFFICE VISIT (OUTPATIENT)
Dept: FAMILY MEDICINE CLINIC | Facility: CLINIC | Age: 48
End: 2023-06-02

## 2023-06-02 VITALS
WEIGHT: 190.8 LBS | DIASTOLIC BLOOD PRESSURE: 86 MMHG | SYSTOLIC BLOOD PRESSURE: 120 MMHG | BODY MASS INDEX: 35.47 KG/M2 | OXYGEN SATURATION: 97 % | HEART RATE: 86 BPM | TEMPERATURE: 97.9 F

## 2023-06-02 DIAGNOSIS — R21 RASH: ICD-10-CM

## 2023-06-02 DIAGNOSIS — G89.29 CHRONIC PAIN OF RIGHT KNEE: Primary | ICD-10-CM

## 2023-06-02 DIAGNOSIS — M25.561 CHRONIC PAIN OF RIGHT KNEE: Primary | ICD-10-CM

## 2023-06-02 PROCEDURE — 3074F SYST BP LT 130 MM HG: CPT | Performed by: FAMILY MEDICINE

## 2023-06-02 PROCEDURE — 99214 OFFICE O/P EST MOD 30 MIN: CPT | Performed by: FAMILY MEDICINE

## 2023-06-02 PROCEDURE — 3079F DIAST BP 80-89 MM HG: CPT | Performed by: FAMILY MEDICINE

## 2023-06-02 RX ORDER — TRIAMCINOLONE ACETONIDE 0.25 MG/G
CREAM TOPICAL 2 TIMES DAILY PRN
Qty: 15 G | Refills: 0 | Status: SHIPPED | OUTPATIENT
Start: 2023-06-02

## 2023-06-02 NOTE — PROGRESS NOTES
Name: Manish Bae      : 1975      MRN: [id number]  Encounter Provider: Med June DO  Encounter Date: 2023   Encounter department: [address] [place] [address]  Chronic pain of right knee  Assessment & Plan:  Right knee pain for one month  Physical exam unremarkable except for anterio-lateral patellar tenderness  - Xray  - Voltaren gel  - tylenol 3g max  Avoid NSAID 2/2 hx of Bariatric surgery    Orders:  -     Diclofenac Sodium (VOLTAREN) 1 %; Apply 2 g topically 4 (four) times a day  -     XR knee 3 vw right non injury; Future; Expected date: 2023    2  Rash  -     triamcinolone (KENALOG) 0 025 % cream; Apply topically 2 (two) times a day as needed for rash         Subjective     Knee Pain   Incident onset: 1 month  Associated symptoms include an inability to bear weight (right leg), numbness and tingling  Pertinent negatives include no muscle weakness  The symptoms are aggravated by weight bearing and palpation  Pain is constant 6/10 for one month  Pt has experienced this pain before but usually it would resolve with a medicated patch  No triggers known  After pt's bariatric surgery, pt has been more mobile however due to the knee pain it is interfering with her life and making it harder to go to the gym  Pt has not tried medications  Worsens with misstep  Nothing improves the pain  Pt trying to be less mobile  30 min on the treadmil, then feet would bet numb, mostly right one  Pt noted a rash that appeared on her chest about three weeks ago  No changes in detergent, new clothes  Benadryl helped  Review of Systems   Neurological: Positive for tingling and numbness         Past Medical History:   Diagnosis Date   • CPAP (continuous positive airway pressure) dependence    • Diabetes mellitus (HCC)     Type 2   • GERD (gastroesophageal reflux disease)    • Heart murmur    • Hypertension    • Seasonal allergies    • Sleep apnea      Past Surgical History:   Procedure Laterality Date   •  SECTION  10/04/2005   • [place] LAPS GSTR RSTCV PX W/BYP GRUPO-EN-Y LIMB <150 CM N/A 2022    Procedure: BYPASS GASTRIC  R-N-Y  INTRAOPERATIVE EGD;;  Surgeon: Brain Laureano MD;  Location: [facility] OR;  Service: Bariatrics   • TUBAL LIGATION       Family History   Problem Relation Age of Onset   • Hypertension Mother    • Diabetes Mother    • Hypertension Father    • Diabetes Father    • Cancer Maternal Grandmother    • Diabetes Maternal Grandfather      Social History     Socioeconomic History   • Marital status: Single     Spouse name: Not on file   • Number of children: 3   • Years of education: Not on file   • Highest education level: Not on file   Occupational History   • Occupation: Behavioral health field   Tobacco Use   • Smoking status: Never   • Smokeless tobacco: Never   Vaping Use   • Vaping Use: Never used   Substance and Sexual Activity   • Alcohol use: Never   • Drug use: Never   • Sexual activity: Not Currently     Birth control/protection: Female Sterilization   Other Topics Concern   • Not on file   Social History Narrative    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     Social Determinants of Health     Financial Resource Strain: [address]  (2022)    Overall Financial Resource Strain (CARDIA)    • Difficulty of Paying Living Expenses: Not hard at all   Food Insecurity: No Food Insecurity (2022)    Hunger Vital Sign    • Worried About Running Out of Food in the Last Year: Never true    • Ran Out of Food in the Last Year: Never true   Transportation Needs: No Transportation Needs (2022)    PRAPARE - Transportation    • Lack of Transportation (Medical): No    • Lack of Transportation (Non-Medical):  No   Physical Activity: Not on file   Stress: No Stress Concern Present (2022)    [address]    • Feeling of Stress : Not at all Social Connections: Not on file   Intimate Partner Violence: Not At Risk (11/18/2022)    Humiliation, Afraid, Rape, and Kick questionnaire    • Fear of Current or Ex-Partner: No    • Emotionally Abused: No    • Physically Abused: No    • Sexually Abused: No   Housing Stability: Low Risk  (6/8/2022)    Housing Stability Vital Sign    • Unable to Pay for Housing in the Last Year: No    • Number of Places Lived in the Last Year: 1    • Unstable Housing in the Last Year: No     Current Outpatient Medications on File Prior to Visit   Medication Sig   • Multiple Vitamins-Minerals (Womens Multi) CAPS Take by mouth   • omeprazole (PriLOSEC) 20 mg delayed release capsule TAKE 1 CAPSULE BY MOUTH EVERY DAY   • polyethylene glycol (GLYCOLAX) 17 GM/SCOOP powder    • polyethylene glycol (MIRALAX) 17 g packet Take 17 g by mouth every other day     No Known Allergies  Immunization History   Administered Date(s) Administered   • COVID-19 PFIZER VACCINE 0 3 ML IM 09/26/2021, 10/17/2021   • Influenza, injectable, quadrivalent, preservative free 0 5 mL 10/21/2020   • Tdap 01/23/2023       Objective     /86   Pulse 86   Temp 97 9 °F (36 6 °C)   Wt 86 5 kg (190 lb 12 8 oz)   SpO2 97%   BMI 35 47 kg/m²     Physical Exam  Constitutional:       General: She is not in acute distress  Appearance: She is obese  She is not ill-appearing or toxic-appearing  HENT:      Head: Normocephalic and atraumatic  Right Ear: External ear normal       Left Ear: External ear normal    Eyes:      General: No scleral icterus  Right eye: No discharge  Left eye: No discharge  Extraocular Movements: Extraocular movements intact  Conjunctiva/sclera: Conjunctivae normal    Cardiovascular:      Rate and Rhythm: Normal rate  Pulmonary:      Effort: Pulmonary effort is normal  No respiratory distress  Musculoskeletal:         General: Tenderness (anterior lateral patella) present  Right lower leg: No edema  Left lower leg: No edema  Comments: Negative straight leg, lachman, posterior draw test, valgus and varus tests  Skin:     General: Skin is warm and dry  Findings: Rash (mild blanchable erythematous rash on chest) present  Neurological:      General: No focal deficit present  Mental Status: She is alert  Motor: No weakness        Gait: Gait normal    Psychiatric:         Mood and Affect: Mood normal          Behavior: Behavior normal        Sana Perez DO

## 2023-06-03 ENCOUNTER — HOSPITAL ENCOUNTER (OUTPATIENT)
Dept: RADIOLOGY | Facility: HOSPITAL | Age: 48
Discharge: HOME | End: 2023-06-03
Payer: COMMERCIAL

## 2023-06-03 DIAGNOSIS — M25.561 CHRONIC PAIN OF RIGHT KNEE: ICD-10-CM

## 2023-06-03 DIAGNOSIS — G89.29 CHRONIC PAIN OF RIGHT KNEE: ICD-10-CM

## 2023-06-03 PROCEDURE — 73562 X-RAY EXAM OF KNEE 3: CPT

## 2023-06-05 PROBLEM — M25.561 CHRONIC PAIN OF RIGHT KNEE: Status: ACTIVE | Noted: 2023-06-05

## 2023-06-05 PROBLEM — G89.29 CHRONIC PAIN OF RIGHT KNEE: Status: ACTIVE | Noted: 2023-06-05

## 2023-06-05 NOTE — ASSESSMENT & PLAN NOTE
Right knee pain for one month  Physical exam unremarkable except for anterio-lateral patellar tenderness  - Xray  - Voltaren gel  - tylenol 3g max   Avoid NSAID 2/2 hx of Bariatric surgery

## 2023-06-12 ENCOUNTER — TELEPHONE (OUTPATIENT)
Dept: FAMILY MEDICINE CLINIC | Facility: CLINIC | Age: 48
End: 2023-06-12

## 2023-06-12 DIAGNOSIS — G89.29 CHRONIC PAIN OF RIGHT KNEE: Primary | ICD-10-CM

## 2023-06-12 DIAGNOSIS — M25.561 CHRONIC PAIN OF RIGHT KNEE: Primary | ICD-10-CM

## 2023-06-12 NOTE — TELEPHONE ENCOUNTER
Called pt regarding results of mild OA  Pt still c/o of significant right knee pain  Pt is currently on vacation and was struggling with terrible knee pain when [place] in the airport  I referred pt to Ortho for further evaluation and possible knee injections  Recommended knee brace for compression and stability, tylenol 3g daily, and rest  Avoid NSAIDs s/p bariatric surgery

## 2023-06-22 ENCOUNTER — OFFICE VISIT (OUTPATIENT)
Dept: BARIATRICS | Facility: CLINIC | Age: 48
End: 2023-06-22
Payer: COMMERCIAL

## 2023-06-22 VITALS
SYSTOLIC BLOOD PRESSURE: 118 MMHG | HEIGHT: 62 IN | DIASTOLIC BLOOD PRESSURE: 68 MMHG | HEART RATE: 84 BPM | WEIGHT: 189.5 LBS | TEMPERATURE: 97.5 F | BODY MASS INDEX: 34.87 KG/M2

## 2023-06-22 DIAGNOSIS — Z98.84 BARIATRIC SURGERY STATUS: ICD-10-CM

## 2023-06-22 DIAGNOSIS — Z48.815 ENCOUNTER FOR SURGICAL AFTERCARE FOLLOWING SURGERY OF DIGESTIVE SYSTEM: Primary | ICD-10-CM

## 2023-06-22 DIAGNOSIS — E66.9 OBESITY, CLASS II, BMI 35-39.9: ICD-10-CM

## 2023-06-22 DIAGNOSIS — E50.9 VITAMIN A DEFICIENCY: ICD-10-CM

## 2023-06-22 DIAGNOSIS — K91.2 POSTSURGICAL MALABSORPTION: ICD-10-CM

## 2023-06-22 PROCEDURE — 99214 OFFICE O/P EST MOD 30 MIN: CPT | Performed by: NURSE PRACTITIONER

## 2023-06-22 NOTE — PROGRESS NOTES
Assessment/Plan:     Patient ID: Raegan Chavez is a 46 y o  female  Bariatric Surgery Status    -s/p Merry-En-Y Gastric Bypass with Dr Barbi Navarro on 11/28/2022  Presents to the office today for 3RD post op visit  Overall doing well, tolerating a regular diet  She is experiencing knee pain - will be seeing orthopedic specialists  Denies having any abdominal pain, N/V/D/C, regurgitation, reflux or dysphagia  PLAN:     - doing well, continue with omeprazole for now  - Routine follow up in 6 months for annual visit  - Continue with healthy lifestyle, adequate protein intake of 60 gm, fluid intake of at least 64 oz  - Continue with MVI daily  - Activity as tolerated  - Labs ordered and will adjust accordingly if any deficiency  - Follow up with RD and SW as needed  Vitamin A deficiency - just recently started on vitamin A 8,000 IU daily  Discuss to stop after 3 months then obtain labs  Repeat labs placed  · Continued/Maintain healthy weight loss with good nutrition intakes  · Adequate hydration with at least 64oz  fluid intake  · Follow diet as discussed  · Follow vitamin and mineral recommendations as reviewed with you  · Exercise as tolerated  · Colonoscopy referral made: Will be getting this done  · Mammogram  - will be getting done     · Follow-up in 6 months for annual visit  We kindly ask that your arrive 15 minutes before your scheduled appointment time with your provider to allow our staff to room you, get your vital signs and update your chart  · Get lab work done prior to annual visit  Please call the office if you need a script  It is recommended to check with your insurance BEFORE getting labs done to make sure they are covered by your policy  · Call our office if you have any problems with abdominal pain especially associated with fever, chills, nausea, vomiting or any other concerns      · All  Post-bariatric surgery patients should be aware that very small quantities of any alcohol can cause impairment and it is very possible not to feel the effect  The effect can be in the system for several hours  It is also a stomach irritant  · It is advised to AVOID alcohol, Nonsteroidal antiinflammatory drugs (NSAIDS) and nicotine of all forms   Any of these can cause stomach irritation/pain  · Discussed the effects of alcohol on a bariatric patient and the increased impairment risk  · Keep up the good work! Postsurgical Malabsorption   -At risk for malabsorption of vitamins/minerals secondary to malabsorption and restriction of intake from bariatric surgery  -Currently taking adequate postop bariatric surgery vitamin supplementation  -Last set of bariatric labs completed on 05/2023 and showed low vitamin A, low potassium, low protein   -Next set of bariatric labs ordered for approximately 3 months  -Patient received education about the importance of adhering to a lifelong supplementation regimen to avoid vitamin/mineral deficiencies      Diagnoses and all orders for this visit:    Encounter for surgical aftercare following surgery of digestive system  -     Vitamin A; Future    Bariatric surgery status  -     Vitamin A; Future  -     Comprehensive metabolic panel; Future    Postsurgical malabsorption  -     Vitamin A; Future  -     Comprehensive metabolic panel; Future    Obesity, Class II, BMI 35-39 9  -     Vitamin A; Future    Vitamin A deficiency  -     Vitamin A; Future         Subjective:      Patient ID: Preethi Jean is a 46 y o  female  -s/p Merry-En-Y Gastric Bypass with Dr Hank Mercer on 11/28/2022  Presents to the office today for 3RD post op visit  Overall doing well, tolerating a regular diet  She is experiencing knee pain - will be seeing orthopedic specialists  Denies having any abdominal pain, N/V/D/C, regurgitation, reflux or dysphagia          Initial: 299 lbs  Current: 189 5 lbs  EWL: (Weight loss is ahead of schedule at this post surgical "period )  Vineet: current   Current BMI is Body mass index is 35 23 kg/m²  · Tolerating a regular diet-yes  · Eating at least 60 grams of protein per day-yes  · Following 30/60 minute rule with liquids-yes  · Drinking at least 64 ounces of fluid per day-yes  · Drinking carbonated beverages-no  · Sufficient exercise-no  · Using NSAIDs regularly-no  · Using nicotine-yes  · Using alcohol- no  · Supplements: Multivitamins and Calcium + vitamin A 8000 IU/2400 mcg    · EWL is 66%, which places the patient ahead of schedule for expected post surgical weight loss at this time  The following portions of the patient's history were reviewed and updated as appropriate: allergies, current medications, past family history, past medical history, past social history, past surgical history and problem list     Review of Systems   Constitutional: Negative  Respiratory: Negative  Cardiovascular: Negative  Gastrointestinal: Negative  Musculoskeletal: Positive for arthralgias (knee )  Neurological: Negative  Psychiatric/Behavioral: Negative  Objective:    /68   Pulse 84   Temp 97 5 °F (36 4 °C) (Tympanic)   Ht 5' 1 5\" (1 562 m)   Wt 86 kg (189 lb 8 oz)   BMI 35 23 kg/m²      Physical Exam  Vitals and nursing note reviewed  Constitutional:       Appearance: Normal appearance  She is obese  Cardiovascular:      Rate and Rhythm: Normal rate and regular rhythm  Pulses: Normal pulses  Heart sounds: Normal heart sounds  Pulmonary:      Effort: Pulmonary effort is normal       Breath sounds: Normal breath sounds  Abdominal:      General: Bowel sounds are normal       Palpations: Abdomen is soft  Tenderness: There is no abdominal tenderness  Musculoskeletal:         General: Swelling (right knee) present  Normal range of motion  Skin:     General: Skin is warm and dry  Neurological:      General: No focal deficit present        Mental Status: She is alert and oriented to " person, place, and time  Psychiatric:         Mood and Affect: Mood normal          Behavior: Behavior normal          Thought Content:  Thought content normal          Judgment: Judgment normal

## 2023-06-26 ENCOUNTER — CONSULT (OUTPATIENT)
Dept: OBGYN CLINIC | Facility: HOSPITAL | Age: 48
End: 2023-06-26
Payer: COMMERCIAL

## 2023-06-26 VITALS
HEART RATE: 82 BPM | BODY MASS INDEX: 34.12 KG/M2 | HEIGHT: 62 IN | SYSTOLIC BLOOD PRESSURE: 110 MMHG | WEIGHT: 185.4 LBS | DIASTOLIC BLOOD PRESSURE: 76 MMHG

## 2023-06-26 DIAGNOSIS — M25.561 CHRONIC PAIN OF RIGHT KNEE: ICD-10-CM

## 2023-06-26 DIAGNOSIS — M17.11 PRIMARY OSTEOARTHRITIS OF RIGHT KNEE: Primary | ICD-10-CM

## 2023-06-26 DIAGNOSIS — G89.29 CHRONIC PAIN OF RIGHT KNEE: ICD-10-CM

## 2023-06-26 PROCEDURE — 20610 DRAIN/INJ JOINT/BURSA W/O US: CPT | Performed by: ORTHOPAEDIC SURGERY

## 2023-06-26 PROCEDURE — 99202 OFFICE O/P NEW SF 15 MIN: CPT | Performed by: ORTHOPAEDIC SURGERY

## 2023-06-26 RX ORDER — BETAMETHASONE SODIUM PHOSPHATE AND BETAMETHASONE ACETATE 3; 3 MG/ML; MG/ML
9 INJECTION, SUSPENSION INTRA-ARTICULAR; INTRALESIONAL; INTRAMUSCULAR; SOFT TISSUE
Status: COMPLETED | OUTPATIENT
Start: 2023-06-26 | End: 2023-06-26

## 2023-06-26 RX ORDER — BUPIVACAINE HYDROCHLORIDE 2.5 MG/ML
1 INJECTION, SOLUTION INFILTRATION; PERINEURAL
Status: COMPLETED | OUTPATIENT
Start: 2023-06-26 | End: 2023-06-26

## 2023-06-26 RX ORDER — BUPIVACAINE HYDROCHLORIDE 2.5 MG/ML
0.5 INJECTION, SOLUTION INFILTRATION; PERINEURAL
Status: COMPLETED | OUTPATIENT
Start: 2023-06-26 | End: 2023-06-26

## 2023-06-26 RX ADMIN — BUPIVACAINE HYDROCHLORIDE 1 ML: 2.5 INJECTION, SOLUTION INFILTRATION; PERINEURAL at 15:30

## 2023-06-26 RX ADMIN — BETAMETHASONE SODIUM PHOSPHATE AND BETAMETHASONE ACETATE 9 MG: 3; 3 INJECTION, SUSPENSION INTRA-ARTICULAR; INTRALESIONAL; INTRAMUSCULAR; SOFT TISSUE at 15:30

## 2023-06-26 RX ADMIN — BUPIVACAINE HYDROCHLORIDE 0.5 ML: 2.5 INJECTION, SOLUTION INFILTRATION; PERINEURAL at 15:30

## 2023-06-26 NOTE — PROGRESS NOTES
Assessment:   Diagnosis ICD-10-CM Associated Orders   1  Chronic pain of right knee  M25 [id number] Ambulatory Referral to Orthopedic Surgery    G89 [id number]           Plan:  ·  Xrays of right knee were reviewed from 6/3/2023  · On exam, she has crepitation under the patella with tenderness over the anterior lateral aspect of the knee  · Conservative treatments were discussed with the patient that includes cortisone injection to help with the inflammation  · Strengthening the quadriceps is important for helping the 'weakness' feeling when descending steps  To do next visit:  Return in about 6 weeks (around 8/7/2023) for right knee  The above stated was discussed in layman's terms and the patient expressed understanding  All questions were answered to the patient's satisfaction  Scribe Attestation    I,:  Bridget Quiroz am acting as a scribe while in the presence of the attending physician :       I,:  Karolina Gallo MD personally performed the services described in this documentation    as scribed in my presence :             Subjective: Sunni Olivas is a 46 y o  female who presents today for a consultation for her right knee pain referred by her PCP, Dr Radha Vargas  Patient reports that she has had intermittent right knee pain over the last couple of years  She has had gastric bypass in November 2022 and noticed an increase in right knee pain as she started to become more active  She reports that there have been times that she has mechanical clicking in the knee and it has gotten stuck  She has a dull aching pain in the anterior aspect of the knee  Due to her history of gastric bypass she cannot take NSAIDs and tries to not take Tylenol  She has tried topical Voltaren gel with no relief  Patient is a controlled type II diabetic with last hemoglobin A1c of 5 5 on 5/13/2023        Review of systems negative unless otherwise specified in HPI  Review of Systems   Constitutional: Negative for chills, fever and unexpected weight change  HENT: Negative for hearing loss, nosebleeds and sore throat  Eyes: Negative for pain, redness and visual disturbance  Respiratory: Negative for cough, shortness of breath and wheezing  Cardiovascular: Negative for chest pain, palpitations and leg swelling  Gastrointestinal: Negative for abdominal pain and nausea  Genitourinary: Negative for dyspareunia, dysuria and frequency  Musculoskeletal: Positive for arthralgias  Skin: Negative for rash and wound  Neurological: Negative for dizziness, numbness and headaches  Psychiatric/Behavioral: Negative for decreased concentration and suicidal ideas  The patient is not nervous/anxious          Past Medical History:   Diagnosis Date   • CPAP (continuous positive airway pressure) dependence    • Diabetes mellitus (HCC)     Type 2   • GERD (gastroesophageal reflux disease)    • Heart murmur    • Hypertension    • Seasonal allergies    • Sleep apnea        Past Surgical History:   Procedure Laterality Date   •  SECTION  10/04/2005   • [place] LAPS GSTR RSTCV PX W/BYP GRUPO-EN-Y LIMB <150 CM N/A 2022    Procedure: BYPASS GASTRIC  R-N-Y  INTRAOPERATIVE EGD;;  Surgeon: Edi Cancino MD;  Location: [facility];  Service: Bariatrics   • TUBAL LIGATION         Family History   Problem Relation Age of Onset   • Hypertension Mother    • Diabetes Mother    • Hypertension Father    • Diabetes Father    • Cancer Maternal Grandmother    • Diabetes Maternal Grandfather        Social History     Occupational History   • Occupation: Behavioral health field   Tobacco Use   • Smoking status: Never   • Smokeless tobacco: Never   Vaping Use   • Vaping Use: Never used   Substance and Sexual Activity   • Alcohol use: Never   • Drug use: Never   • Sexual activity: Not Currently     Birth control/protection: Female Sterilization         Current Outpatient Medications:   •  Diclofenac Sodium (VOLTAREN) 1 %, Apply 2 g topically 4 (four) "times a day, Disp: 50 g, Rfl: 1  •  Multiple Vitamins-Minerals (Womens Multi) CAPS, Take by mouth, Disp: , Rfl:   •  omeprazole (PriLOSEC) 20 mg delayed release capsule, TAKE 1 CAPSULE BY MOUTH EVERY DAY, Disp: 90 capsule, Rfl: 2  •  polyethylene glycol (MIRALAX) 17 g packet, Take 17 g by mouth every other day, Disp: 30 each, Rfl: 2  •  triamcinolone (KENALOG) 0 025 % cream, Apply topically 2 (two) times a day as needed for rash, Disp: 15 g, Rfl: 0  •  polyethylene glycol (GLYCOLAX) 17 GM/SCOOP powder, , Disp: , Rfl:     No Known Allergies         Vitals:    06/26/23 1554   BP: 110/76   Pulse: 82       Objective:                    Right Knee Exam     Tenderness   The patient is experiencing tenderness in the medial joint line, lateral joint line and patella  Range of Motion   Extension: 0   Flexion: 120     Tests   Joleen:  Medial - negative   Varus: negative Valgus: negative  Lachman:  Anterior - negative        Other   Erythema: absent  Sensation: normal  Pulse: present  Swelling: none  Effusion: no effusion present    Comments:  Calf is soft and non tender            Diagnostics, reviewed and taken today if performed as documented:    None performed      The attending physician has personally reviewed the pertinent films in PACS and interpretation is as follows:  Strays of the right knee were reviewed from 6/3/2023: Lateral compartment narrowing noted with osteophyte formation, patellofemoral compartment has arthritic change noted with osteophyte formation on both the lateral and medial facets  Procedures, if performed today:    Large joint arthrocentesis: R knee  Universal Protocol:  Consent: Verbal consent obtained  Risks and benefits: risks, benefits and alternatives were discussed  Consent given by: patient  Time out: Immediately prior to procedure a \"time out\" was called to verify the correct patient, procedure, equipment, support staff and site/side marked as required    Timeout called at: " "6/26/2023 4:46 PM   Patient understanding: patient states understanding of the procedure being performed  Site marked: the operative site was marked  Patient identity confirmed: verbally with patient    Supporting Documentation  Indications: pain   Procedure Details  Location: knee - R knee  Needle size: 22 G  Ultrasound guidance: no  Approach: lateral  Medications administered: 1 mL bupivacaine 0 25 %; 0 5 mL bupivacaine 0 25 %; 9 mg betamethasone acetate-betamethasone sodium phosphate 6 (3-3) mg/mL    Patient tolerance: patient tolerated the procedure well with no immediate complications  Dressing:  Sterile dressing applied              Portions of the record may have been created with voice recognition software  Occasional wrong word or \"sound a like\" substitutions may have occurred due to the inherent limitations of voice recognition software  Read the chart carefully and recognize, using context, where substitutions have occurred    "

## 2023-08-05 ENCOUNTER — APPOINTMENT (OUTPATIENT)
Dept: LAB | Facility: CLINIC | Age: 48
End: 2023-08-05
Payer: COMMERCIAL

## 2023-08-05 DIAGNOSIS — Z48.815 ENCOUNTER FOR SURGICAL AFTERCARE FOLLOWING SURGERY OF DIGESTIVE SYSTEM: ICD-10-CM

## 2023-08-05 DIAGNOSIS — Z98.84 BARIATRIC SURGERY STATUS: ICD-10-CM

## 2023-08-05 DIAGNOSIS — K91.2 POSTSURGICAL MALABSORPTION: ICD-10-CM

## 2023-08-05 DIAGNOSIS — E66.9 OBESITY, CLASS II, BMI 35-39.9: ICD-10-CM

## 2023-08-05 DIAGNOSIS — E50.9 VITAMIN A DEFICIENCY: ICD-10-CM

## 2023-08-05 LAB
ALBUMIN SERPL BCP-MCNC: 2.9 G/DL (ref 3.5–5)
ALP SERPL-CCNC: 76 U/L (ref 46–116)
ALT SERPL W P-5'-P-CCNC: 29 U/L (ref 12–78)
ANION GAP SERPL CALCULATED.3IONS-SCNC: 3 MMOL/L
AST SERPL W P-5'-P-CCNC: 18 U/L (ref 5–45)
BILIRUB SERPL-MCNC: 0.47 MG/DL (ref 0.2–1)
BUN SERPL-MCNC: 10 MG/DL (ref 5–25)
CALCIUM ALBUM COR SERPL-MCNC: 10.6 MG/DL (ref 8.3–10.1)
CALCIUM SERPL-MCNC: 9.7 MG/DL (ref 8.3–10.1)
CHLORIDE SERPL-SCNC: 109 MMOL/L (ref 96–108)
CO2 SERPL-SCNC: 30 MMOL/L (ref 21–32)
CREAT SERPL-MCNC: 0.31 MG/DL (ref 0.6–1.3)
GFR SERPL CREATININE-BSD FRML MDRD: 132 ML/MIN/1.73SQ M
GLUCOSE P FAST SERPL-MCNC: 100 MG/DL (ref 65–99)
POTASSIUM SERPL-SCNC: 3.5 MMOL/L (ref 3.5–5.3)
PROT SERPL-MCNC: 7.4 G/DL (ref 6.4–8.4)
SODIUM SERPL-SCNC: 142 MMOL/L (ref 135–147)

## 2023-08-05 PROCEDURE — 84590 ASSAY OF VITAMIN A: CPT

## 2023-08-05 PROCEDURE — 80053 COMPREHEN METABOLIC PANEL: CPT

## 2023-08-05 PROCEDURE — 36415 COLL VENOUS BLD VENIPUNCTURE: CPT

## 2023-08-07 ENCOUNTER — OFFICE VISIT (OUTPATIENT)
Dept: OBGYN CLINIC | Facility: HOSPITAL | Age: 48
End: 2023-08-07
Payer: COMMERCIAL

## 2023-08-07 ENCOUNTER — HOSPITAL ENCOUNTER (OUTPATIENT)
Dept: RADIOLOGY | Facility: HOSPITAL | Age: 48
Discharge: HOME | End: 2023-08-07
Attending: ORTHOPAEDIC SURGERY
Payer: COMMERCIAL

## 2023-08-07 VITALS
WEIGHT: 182.4 LBS | HEART RATE: 101 BPM | HEIGHT: 62 IN | BODY MASS INDEX: 33.57 KG/M2 | DIASTOLIC BLOOD PRESSURE: 81 MMHG | SYSTOLIC BLOOD PRESSURE: 123 MMHG

## 2023-08-07 DIAGNOSIS — M25.561 CHRONIC PAIN OF RIGHT KNEE: Primary | ICD-10-CM

## 2023-08-07 DIAGNOSIS — M54.16 RADICULOPATHY, LUMBAR REGION: ICD-10-CM

## 2023-08-07 DIAGNOSIS — G89.29 CHRONIC PAIN OF RIGHT KNEE: Primary | ICD-10-CM

## 2023-08-07 DIAGNOSIS — G89.29 CHRONIC PAIN OF RIGHT KNEE: ICD-10-CM

## 2023-08-07 DIAGNOSIS — M17.11 PRIMARY OSTEOARTHRITIS OF RIGHT KNEE: ICD-10-CM

## 2023-08-07 DIAGNOSIS — M25.561 CHRONIC PAIN OF RIGHT KNEE: ICD-10-CM

## 2023-08-07 PROCEDURE — 73562 X-RAY EXAM OF KNEE 3: CPT

## 2023-08-07 PROCEDURE — 99213 OFFICE O/P EST LOW 20 MIN: CPT | Performed by: ORTHOPAEDIC SURGERY

## 2023-08-07 NOTE — PROGRESS NOTES
Assessment:  1. Chronic pain of right knee  XR knee 3 vw right non injury    Ambulatory Referral to Physical Therapy    Injection Procedure Prior Authorization      2. Primary osteoarthritis of right knee  Ambulatory Referral to Physical Therapy    Injection Procedure Prior Authorization      3. Radiculopathy, lumbar region  Ambulatory Referral to Physical Therapy          Plan:  · Recommend patient start a course of physical therapy to address the lumbar spine as well as the right knee. · Right knee visco supplementation ordered, we will contact the patient once approved. · Patient has failed conservative therapy in the form of CS injection and Voltaren  · Patient  is symptomatic with pain that interferes with ADL's, sleep, crepitus, or knee stiffness  · Patient score is 8/10    To do next visit:  Return for Visco injections. The above stated was discussed in layman's terms and the patient expressed understanding. All questions were answered to the patient's satisfaction. Subjective: Linda Hayes is a 46 y.o. female who presents today for follow up of right knee OA. Patient was provided with a CS injection at the last visit on 23. Injection helped for about 1 mos. Patient reports on 23 fell causing increased right knee pain as well as right buttock pain with radicular symptoms to the right foot.        Past Medical History:   Diagnosis Date   • CPAP (continuous positive airway pressure) dependence    • Diabetes mellitus (HCC)     Type 2   • GERD (gastroesophageal reflux disease)    • Heart murmur    • Hypertension    • Seasonal allergies    • Sleep apnea        Past Surgical History:   Procedure Laterality Date   •  SECTION  10/04/2005   • [place] LAPS GSTR RSTCV PX W/BYP GRUPO-EN-Y LIMB <150 CM N/A 2022    Procedure: BYPASS GASTRIC  R-N-Y  INTRAOPERATIVE EGD;;  Surgeon: Pierre Whitfield MD;  Location: [facility];  Service: Bariatrics   • TUBAL LIGATION         Family History   Problem Relation Age of Onset   • Hypertension Mother    • Diabetes Mother    • Hypertension Father    • Diabetes Father    • Cancer Maternal Grandmother    • Diabetes Maternal Grandfather        Social History     Occupational History   • Occupation: Behavioral health field   Tobacco Use   • Smoking status: Never   • Smokeless tobacco: Never   Vaping Use   • Vaping Use: Never used   Substance and Sexual Activity   • Alcohol use: Never   • Drug use: Never   • Sexual activity: Not Currently     Birth control/protection: Female Sterilization         Current Outpatient Medications:   •  Diclofenac Sodium (VOLTAREN) 1 %, Apply 2 g topically 4 (four) times a day, Disp: 50 g, Rfl: 1  •  Multiple Vitamins-Minerals (Womens Multi) CAPS, Take by mouth, Disp: , Rfl:   •  omeprazole (PriLOSEC) 20 mg delayed release capsule, TAKE 1 CAPSULE BY MOUTH EVERY DAY, Disp: 90 capsule, Rfl: 2  •  triamcinolone (KENALOG) 0.025 % cream, Apply topically 2 (two) times a day as needed for rash, Disp: 15 g, Rfl: 0  •  polyethylene glycol (GLYCOLAX) 17 GM/SCOOP powder, , Disp: , Rfl:   •  polyethylene glycol (MIRALAX) 17 g packet, Take 17 g by mouth every other day, Disp: 30 each, Rfl: 2    No Known Allergies      Objective:  Vitals:    08/07/23 1550   BP: 123/81   Pulse: 101       Review of Systems   Constitutional: Negative for chills and fever. HENT: Negative for drooling and sneezing. Eyes: Negative for redness. Respiratory: Negative for cough and wheezing. Gastrointestinal: Negative for nausea and vomiting. Musculoskeletal:        Please see ortho exam   Psychiatric/Behavioral: Negative for behavioral problems. The patient is not nervous/anxious. Right Knee Exam     Tenderness   The patient is experiencing tenderness in the medial joint line and pes anserinus.     Range of Motion   Extension: 5   Flexion: 120     Tests   Varus: negative Valgus: negative    Other   Erythema: absent  Sensation: normal  Pulse: present  Swelling: none  Effusion: no effusion present            Physical Exam  Vitals reviewed. Constitutional:       Appearance: She is well-developed. Eyes:      Pupils: Pupils are equal, round, and reactive to light. Pulmonary:      Effort: Pulmonary effort is normal.      Breath sounds: Normal breath sounds. Abdominal:      General: Abdomen is flat. There is no distension. Musculoskeletal:      Right knee: No effusion. Skin:     General: Skin is warm and dry. Neurological:      Mental Status: She is alert and oriented to person, place, and time. Psychiatric:         Behavior: Behavior normal.         Thought Content: Thought content normal.         Judgment: Judgment normal.           Diagnostics, reviewed and taken today if performed as documented: The attending physician has personally reviewed the pertinent films in PACS and interpretation is as follows:  Right knee x-rays demonstrates no fracture or dislocation, moderate tricompartmental degenerative changes. Procedures, if performed today:    Procedures    None performed        Scribe Attestation    I,:  Horace Eliass am acting as a scribe while in the presence of the attending physician.:       I,:  Reva Morrell MD personally performed the services described in this documentation    as scribed in my presence.:               Portions of the record may have been created with voice recognition software. Occasional wrong word or "sound a like" substitutions may have occurred due to the inherent limitations of voice recognition software. Read the chart carefully and recognize, using context, where substitutions have occurred.

## 2023-08-08 LAB — VIT A SERPL-MCNC: 21.2 UG/DL (ref 20.1–62)

## 2023-08-09 ENCOUNTER — EVALUATION (OUTPATIENT)
Dept: PHYSICAL THERAPY | Facility: CLINIC | Age: 48
End: 2023-08-09
Payer: COMMERCIAL

## 2023-08-09 ENCOUNTER — TELEPHONE (OUTPATIENT)
Dept: BARIATRICS | Facility: CLINIC | Age: 48
End: 2023-08-09

## 2023-08-09 DIAGNOSIS — M25.561 CHRONIC PAIN OF RIGHT KNEE: Primary | ICD-10-CM

## 2023-08-09 DIAGNOSIS — E83.52 HIGH CALCIUM LEVELS: ICD-10-CM

## 2023-08-09 DIAGNOSIS — M17.11 PRIMARY OSTEOARTHRITIS OF RIGHT KNEE: ICD-10-CM

## 2023-08-09 DIAGNOSIS — Z98.84 BARIATRIC SURGERY STATUS: Primary | ICD-10-CM

## 2023-08-09 DIAGNOSIS — G89.29 CHRONIC PAIN OF RIGHT KNEE: Primary | ICD-10-CM

## 2023-08-09 DIAGNOSIS — M54.16 RADICULOPATHY, LUMBAR REGION: ICD-10-CM

## 2023-08-09 PROCEDURE — 97110 THERAPEUTIC EXERCISES: CPT

## 2023-08-09 PROCEDURE — 97112 NEUROMUSCULAR REEDUCATION: CPT

## 2023-08-09 PROCEDURE — 97161 PT EVAL LOW COMPLEX 20 MIN: CPT

## 2023-08-09 NOTE — TELEPHONE ENCOUNTER
Called pt review lab results, pt understood what was communicated and advised. Pt stated she is currently not taking any calcium, for her labs last time was high, pt is only drink 1 protein shake a day, spoke with provider she advised her to stop the protein shake for now until she repeats her labs in 3 months.         ----- Message from Shawnee Cartohu sent at 8/9/2023 11:45 AM EDT -----  Can we please call patient to let her know her vitamin A level is normal now. Her calcium is slighly high. Can we ask her how much is she taking and if she is also drinking protein shakes? If she is doing calcium 3 times per day, please decrease to only twice. Protein levels improved very slightly, however I think she runs low at baseline. I would like to monitor It for now. Repeat calcium levels in 3 months to see if If trends down.

## 2023-08-09 NOTE — PROGRESS NOTES
PT Evaluation     Today's date: 2023  Patient name: Smiley Pope  : 1975  MRN: [id number]  Referring provider: Duane Ellen, MD  Dx:   Encounter Diagnosis     ICD-10-CM    1. Chronic pain of right knee  M25.561 Ambulatory Referral to Physical Therapy    G89.29       2. Primary osteoarthritis of right knee  M17.11 Ambulatory Referral to Physical Therapy      3. Radiculopathy, lumbar region  M54.16 Ambulatory Referral to Physical Therapy                     Assessment  Assessment details: Bee Treadwell is a 45 yo female presenting with complaints of R knee pain with mechanical symptoms. Pt demonstrates decreased R knee A/PROM, decreased decreased flex/ext strength and poor body mechanics leading to limitations with standing, walking, sitting, stair climbing and ADLs. Pt's clinical presentation aligns with knee OA and possible medial mensicus pathology. Pt would benefit from skilled physical therapy interventions in order to address the stated impairments, decrease pain with function and increase activity tolerance in order to improve quality of life. No further referral appears necessary at this time based on examination results. Prognosis is good given HEP compliance and PT 1-2/wk.  Positive prognostic indicators include positive attitude toward recovery. Please contact me if you have any questions or recommendations. Thank you for the opportunity to share in Pamella's care.     Impairments: abnormal gait, abnormal muscle firing, abnormal muscle tone, abnormal or restricted ROM, activity intolerance, impaired balance, impaired physical strength, lacks appropriate home exercise program, pain with function, safety issue, weight-bearing intolerance, poor posture  and poor body mechanics    Symptom irritability: moderateBarriers to therapy: None  Understanding of Dx/Px/POC: good   Prognosis: good    Goals  Short Term Goals:  Pt will report decreased levels of pain by at least 2 subjective ratings in 4 weeks  Pt will demonstrate improved ROM by at least 10 degrees in 4 weeks  Pt will demonstrate improved strength by ½ grade in 4 weeks  Pt will be able to walk for 15 minutes without pain in 4 weeks    Long Term Goals:  Pt will be independent with HEP in 8 weeks  Pt will be pain free with ADL's in 8 weeks  Pt will be able to walk for 30 minutes for exercise in 8 weeks      Plan  Patient would benefit from: skilled physical therapy  Referral necessary: No  Planned modality interventions: low level laser therapy  Planned therapy interventions: abdominal trunk stabilization, IASTM, joint mobilization, manual therapy, balance, balance/weight bearing training, nerve gliding, neuromuscular re-education, body mechanics training, patient education, postural training, strengthening, stretching, functional ROM exercises, therapeutic activities, therapeutic exercise, gait training, graded activity, graded exercise and home exercise program  Frequency: 2x week  Duration in weeks: 12  Plan of Care beginning date: 8/9/2023  Plan of Care expiration date: 11/1/2023  Treatment plan discussed with: patient        Subjective Evaluation    History of Present Illness  Mechanism of injury: Pt reports for years she has had R knee pain. She recently had bariatric surgery and began walking to maintain her weight loss. Upon increasing her walking, she had exacerbation of R knee pain. Since January she has had increased R knee pain with clicking and locking. She has a hard time with long duration sitting at work, standing, walking, stair climbing and ADLs. She did previously receive a cortisone injection which helped for about a month and half. Denies 5d's, 3n's. Pt denies night pain, unexplained weight changes, bowel/bladder changes.             Recurrent probem    Quality of life: fair    Patient Goals  Patient goals for therapy: decreased pain, improved balance, increased motion, increased strength, independence with ADLs/IADLs, return to sport/leisure activities and decreased edema    Pain  Current pain ratin  At best pain ratin  At worst pain ratin  Location: Anterior R knee pain  Quality: dull ache, sharp and pressure  Aggravating factors: sitting, standing, stair climbing and walking  Progression: worsening    Social Support  Steps to enter house: yes  Stairs in house: yes   Lives in: multiple-level home  Lives with: adult children    Employment status: working  Hand dominance: right    Treatments  Previous treatment: injection treatment        Objective     Observations   Left Knee   Positive for edema. Right Knee   Positive for edema and effusion. Tenderness     Right Knee   Tenderness in the lateral joint line, medial joint line and patellar tendon. Neurological Testing     Sensation     Knee   Left Knee   Intact: light touch    Right Knee   Intact: light touch     Additional Neurological Details  LE strength and sensation symmetrical throughout    Active Range of Motion   Left Knee   Flexion: 131 degrees   Extension: 0 degrees     Right Knee   Flexion: 123 degrees with pain  Extension: 20 degrees with pain    Passive Range of Motion   Left Knee   Flexion: 131 degrees   Extension: 0 degrees     Right Knee   Flexion: 123 degrees with pain  Extension: 5 degrees with pain    Strength/Myotome Testing     Left Knee   Flexion: 3+  Extension: 4-    Right Knee   Flexion: 3+  Extension: 4-    Tests     Right Knee   Positive medial Joleen and Thessaly's test at 20 degrees. Negative valgus stress test at 30 degrees and varus stress test at 30 degrees. Precautions: Cardiac history, HTN, DM Type II, History of gastric bypass      Manuals             R knee laser?                                                     Neuro Re-Ed             Quad sets 20x5"            SLR             Supine clamshells Red 3x10            Standing hip abduction                                                    Ther Ex             Recumb bike 2 min 7 min           Heel slides 3x10            LAQ 3x10            HS machine                                                                 Ther Activity             STS             Step ups/downs             Gait Training                                       Modalities

## 2023-08-14 ENCOUNTER — OFFICE VISIT (OUTPATIENT)
Dept: PHYSICAL THERAPY | Facility: CLINIC | Age: 48
End: 2023-08-14
Payer: COMMERCIAL

## 2023-08-14 DIAGNOSIS — G89.29 CHRONIC PAIN OF RIGHT KNEE: Primary | ICD-10-CM

## 2023-08-14 DIAGNOSIS — M17.11 PRIMARY OSTEOARTHRITIS OF RIGHT KNEE: ICD-10-CM

## 2023-08-14 DIAGNOSIS — M25.561 CHRONIC PAIN OF RIGHT KNEE: Primary | ICD-10-CM

## 2023-08-14 PROCEDURE — 97110 THERAPEUTIC EXERCISES: CPT

## 2023-08-14 PROCEDURE — 97112 NEUROMUSCULAR REEDUCATION: CPT

## 2023-08-14 PROCEDURE — 97530 THERAPEUTIC ACTIVITIES: CPT

## 2023-08-14 NOTE — PROGRESS NOTES
Daily Note     Today's date: 2023  Patient name: Yvonne Castillo  : 1975  MRN: [id number]  Referring provider: Chris Beaver MD  Dx:   Encounter Diagnosis     ICD-10-CM    1. Chronic pain of right knee  M25.561     G89.29       2. Primary osteoarthritis of right knee  M17.11                      Subjective: Pt reports good compliance with HEP, difficulty straightening her right knee all the way due to pain. Objective: See treatment diary below      Assessment: Pt demonstrated difficulty with R SLS and step downs secondary to knee pain. Pt demonstrated moderate knee valgus with STS. Plan: Continue poc as per PT. Continue to progress quad strength nv. Precautions: Cardiac history, HTN, DM Type II, History of gastric bypass      Manuals            R knee laser?                                                     Neuro Re-Ed             Quad sets 20x5" 20x5"           SLR  nv           Supine clamshells Red 3x10 Red   3x10           Standing hip abduction  3x10                                                  Ther Ex             Recumb bike 2 min 7 min           Heel slides 3x10 3x10           LAQ 3x10 3x10           HS machine  nv                                                               Ther Activity             STS  1x10           Step ups/downs  4"/ 2x10             Gait Training                                       Modalities

## 2023-08-17 ENCOUNTER — OFFICE VISIT (OUTPATIENT)
Dept: PHYSICAL THERAPY | Facility: CLINIC | Age: 48
End: 2023-08-17
Payer: COMMERCIAL

## 2023-08-17 DIAGNOSIS — G89.29 CHRONIC PAIN OF RIGHT KNEE: Primary | ICD-10-CM

## 2023-08-17 DIAGNOSIS — M25.561 CHRONIC PAIN OF RIGHT KNEE: Primary | ICD-10-CM

## 2023-08-17 DIAGNOSIS — M17.11 PRIMARY OSTEOARTHRITIS OF RIGHT KNEE: ICD-10-CM

## 2023-08-17 PROCEDURE — 97112 NEUROMUSCULAR REEDUCATION: CPT

## 2023-08-17 PROCEDURE — 97530 THERAPEUTIC ACTIVITIES: CPT

## 2023-08-17 PROCEDURE — 97110 THERAPEUTIC EXERCISES: CPT

## 2023-08-17 NOTE — PROGRESS NOTES
Daily Note     Today's date: 2023  Patient name: Gabriel Ventura  : 1975  MRN: [id number]  Referring provider: Silke Montoya MD  Dx:   Encounter Diagnosis     ICD-10-CM    1. Chronic pain of right knee  M25.561     G89.29       2. Primary osteoarthritis of right knee  M17.11                      Subjective: Pt reports she is still having pain but it is slightly less pain than before. She also reports continued mechanical symptoms. Pt reports she is noticing possible leg length discrepancy when doing standing hip abduction. Objective: See treatment diary below      Assessment: Knee valgus significantly improved with cueing with foam. Some irritation with standing hip abduction indicating poor stability. Continue to progress hip flexion and quad strengthening as able and patient has significant weakness. Plan: Continue per POC. Progress as able. Precautions: Cardiac history, HTN, DM Type II, History of gastric bypass      Manuals           R knee laser?                                                     Neuro Re-Ed             Quad sets 20x5" 20x5" 20x5"          SLR  nv 1x10 PT (A)          Supine marching   3x10          Supine clamshells Red 3x10 Red   3x10 Red   3x10          Standing hip abduction  3x10 3x10 on step                                                 Ther Ex             Recumb bike 2 min 7 min 5 min          Heel slides 3x10 3x10 3x10          LAQ 3x10 3x10 3x10          HS machine  nv 3x10                                                              Ther Activity             STS  1x10 1x10 with foam on chair and foam to prevent balance          Step ups/downs  4"/ 2x10   4" 2x10          Gait Training                                       Modalities

## 2023-08-21 ENCOUNTER — PROCEDURE VISIT (OUTPATIENT)
Dept: OBGYN CLINIC | Facility: HOSPITAL | Age: 48
End: 2023-08-21
Payer: COMMERCIAL

## 2023-08-21 VITALS
SYSTOLIC BLOOD PRESSURE: 128 MMHG | WEIGHT: 180.6 LBS | DIASTOLIC BLOOD PRESSURE: 83 MMHG | HEART RATE: 69 BPM | HEIGHT: 62 IN | BODY MASS INDEX: 33.23 KG/M2

## 2023-08-21 DIAGNOSIS — M17.11 PRIMARY OSTEOARTHRITIS OF RIGHT KNEE: ICD-10-CM

## 2023-08-21 DIAGNOSIS — M25.561 CHRONIC PAIN OF RIGHT KNEE: Primary | ICD-10-CM

## 2023-08-21 DIAGNOSIS — G89.29 CHRONIC PAIN OF RIGHT KNEE: Primary | ICD-10-CM

## 2023-08-21 PROCEDURE — 20610 DRAIN/INJ JOINT/BURSA W/O US: CPT | Performed by: ORTHOPAEDIC SURGERY

## 2023-08-21 RX ORDER — HYALURONATE SODIUM 10 MG/ML
20 SYRINGE (ML) INTRAARTICULAR
Status: COMPLETED | OUTPATIENT
Start: 2023-08-21 | End: 2023-08-21

## 2023-08-21 RX ADMIN — Medication 20 MG: at 17:00

## 2023-08-21 NOTE — PROGRESS NOTES
Assessment:  1. Chronic pain of right knee        2. Primary osteoarthritis of right knee            Plan:  1st right knee Euflexxa. The patient was provided with 1st right knee Euflexxa injection. The patient tolerated the procedure well. The patient should follow up in one week. To do next visit:  Return in about 1 week (around 2023) for 2nd right knee Euflexxa. The above stated was discussed in layman's terms and the patient expressed understanding. All questions were answered to the patient's satisfaction. Scribe Attestation    I,:  Petra Simons am acting as a scribe while in the presence of the attending physician.:       I,:  Eugenio Peck MD personally performed the services described in this documentation    as scribed in my presence.:             Subjective: Debby Gallo is a 46 y.o. female who presents for follow up of right knee and 1st Euflexxa. Today she complains of right generalized knee pain. Prolonged weight bearing and repetitive bending aggravates while rest alleviates.         Review of systems negative unless otherwise specified in HPI    Past Medical History:   Diagnosis Date   • CPAP (continuous positive airway pressure) dependence    • Diabetes mellitus (HCC)     Type 2   • GERD (gastroesophageal reflux disease)    • Heart murmur    • Hypertension    • Seasonal allergies    • Sleep apnea        Past Surgical History:   Procedure Laterality Date   •  SECTION  10/04/2005   • [place] LAPS GSTR RSTCV PX W/BYP GRUPO-EN-Y LIMB <150 CM N/A 2022    Procedure: BYPASS GASTRIC  R-N-Y  INTRAOPERATIVE EGD;;  Surgeon: Hoda Dickey MD;  Location: [facility];  Service: Bariatrics   • TUBAL LIGATION         Family History   Problem Relation Age of Onset   • Hypertension Mother    • Diabetes Mother    • Hypertension Father    • Diabetes Father    • Cancer Maternal Grandmother    • Diabetes Maternal Grandfather        Social History     Occupational History   • Occupation: Behavioral health field   Tobacco Use   • Smoking status: Never   • Smokeless tobacco: Never   Vaping Use   • Vaping Use: Never used   Substance and Sexual Activity   • Alcohol use: Never   • Drug use: Never   • Sexual activity: Not Currently     Birth control/protection: Female Sterilization         Current Outpatient Medications:   •  Diclofenac Sodium (VOLTAREN) 1 %, Apply 2 g topically 4 (four) times a day, Disp: 50 g, Rfl: 1  •  Multiple Vitamins-Minerals (Womens Multi) CAPS, Take by mouth, Disp: , Rfl:   •  omeprazole (PriLOSEC) 20 mg delayed release capsule, TAKE 1 CAPSULE BY MOUTH EVERY DAY, Disp: 90 capsule, Rfl: 2  •  triamcinolone (KENALOG) 0.025 % cream, Apply topically 2 (two) times a day as needed for rash, Disp: 15 g, Rfl: 0  •  polyethylene glycol (GLYCOLAX) 17 GM/SCOOP powder, , Disp: , Rfl:   •  polyethylene glycol (MIRALAX) 17 g packet, Take 17 g by mouth every other day, Disp: 30 each, Rfl: 2    No Known Allergies         Vitals:    08/21/23 1705   BP: 128/83   Pulse: 69       Objective:  Physical exam  · General: Awake, Alert, Oriented  · Eyes: Pupils equal, round and reactive to light  · Heart: regular rate and rhythm  · Lungs: No audible wheezing  · Abdomen: soft                    Ortho Exam  Right knee:  No erythema or ecchymosis  No effusion or swelling  Normal strength  Good ROM with crepitus   Calf compartments soft and supple  Sensation intact  Toes are warm sensate and mobile      Diagnostics, reviewed and taken today if performed as documented:    None performed     Procedures, if performed today:    Large joint arthrocentesis: R knee  Universal Protocol:  Consent: Verbal consent obtained. Risks and benefits: risks, benefits and alternatives were discussed  Consent given by: patient  Time out: Immediately prior to procedure a "time out" was called to verify the correct patient, procedure, equipment, support staff and site/side marked as required.   Timeout called at: 8/21/2023 5:18 PM.  Patient understanding: patient states understanding of the procedure being performed  Patient identity confirmed: verbally with patient    Supporting Documentation  Indications: pain   Procedure Details  Location: knee - R knee  Preparation: Patient was prepped and draped in the usual sterile fashion  Needle size: 22 G  Ultrasound guidance: no  Approach: anterolateral  Medications administered: 20 mg Sodium Hyaluronate (Viscosup) 20 MG/2ML    Patient tolerance: patient tolerated the procedure well with no immediate complications  Dressing:  Sterile dressing applied              Portions of the record may have been created with voice recognition software. Occasional wrong word or "sound a like" substitutions may have occurred due to the inherent limitations of voice recognition software. Read the chart carefully and recognize, using context, where substitutions have occurred.

## 2023-08-22 ENCOUNTER — OFFICE VISIT (OUTPATIENT)
Dept: PHYSICAL THERAPY | Facility: CLINIC | Age: 48
End: 2023-08-22
Payer: COMMERCIAL

## 2023-08-22 DIAGNOSIS — M54.16 RADICULOPATHY, LUMBAR REGION: ICD-10-CM

## 2023-08-22 DIAGNOSIS — M17.11 PRIMARY OSTEOARTHRITIS OF RIGHT KNEE: ICD-10-CM

## 2023-08-22 DIAGNOSIS — M25.561 CHRONIC PAIN OF RIGHT KNEE: Primary | ICD-10-CM

## 2023-08-22 DIAGNOSIS — G89.29 CHRONIC PAIN OF RIGHT KNEE: Primary | ICD-10-CM

## 2023-08-22 PROCEDURE — 97110 THERAPEUTIC EXERCISES: CPT

## 2023-08-22 PROCEDURE — 97530 THERAPEUTIC ACTIVITIES: CPT

## 2023-08-22 PROCEDURE — 97112 NEUROMUSCULAR REEDUCATION: CPT

## 2023-08-22 NOTE — PROGRESS NOTES
Daily Note     Today's date: 2023  Patient name: Skip Adair  : 1975  MRN: [id number]  Referring provider: Rosilyn Blizzard, MD  Dx:   Encounter Diagnosis     ICD-10-CM    1. Chronic pain of right knee  M25.561     G89.29       2. Primary osteoarthritis of right knee  M17.11       3. Radiculopathy, lumbar region  M54.16           Start Time: 1150  Stop Time: 1230  Total time in clinic (min): 40 minutes    Subjective: Patient reports soreness prior to therapy secondary to an injection yesterday. Objective: See treatment diary below    Assessment: Knee valgus noted during step ups. SLR still requires therapist assist with a quad lag noted. Continue to progress hip flexion and quad strengthening as able and patient has significant weakness. Plan: Continue per POC. Progress as able. Precautions: Cardiac history, HTN, DM Type II, History of gastric bypass    Manuals          R knee laser?                                                     Neuro Re-Ed             Quad sets 20x5" 20x5" 20x5" 20x5"         SLR  nv 1x10 PT (A) 1x10 PT (A)         Supine marching   3x10 3x10         Supine clamshells Red 3x10 Red   3x10 Red   3x10 Red  3x10         Standing hip abduction  3x10 3x10 on step 3x10  on step                                                Ther Ex             Recumb bike 2 min 7 min 5 min nv         Heel slides 3x10 3x10 3x10 3x10         LAQ 3x10 3x10 3x10 3x10         HS machine  nv 3x10 nv                                                             Ther Activity             STS  1x10 1x10 with foam on chair and foam to prevent balance 1x10  Foam on chair         Step ups/downs  4"/ 2x10   4" 2x10          Step up    4"  2x10                                   Gait Training                                       Modalities

## 2023-08-24 ENCOUNTER — OFFICE VISIT (OUTPATIENT)
Dept: PHYSICAL THERAPY | Facility: CLINIC | Age: 48
End: 2023-08-24
Payer: COMMERCIAL

## 2023-08-24 DIAGNOSIS — G89.29 CHRONIC PAIN OF RIGHT KNEE: Primary | ICD-10-CM

## 2023-08-24 DIAGNOSIS — M54.16 RADICULOPATHY, LUMBAR REGION: ICD-10-CM

## 2023-08-24 DIAGNOSIS — M17.11 PRIMARY OSTEOARTHRITIS OF RIGHT KNEE: ICD-10-CM

## 2023-08-24 DIAGNOSIS — M25.561 CHRONIC PAIN OF RIGHT KNEE: Primary | ICD-10-CM

## 2023-08-24 PROCEDURE — 97110 THERAPEUTIC EXERCISES: CPT

## 2023-08-24 PROCEDURE — 97530 THERAPEUTIC ACTIVITIES: CPT

## 2023-08-24 PROCEDURE — 97112 NEUROMUSCULAR REEDUCATION: CPT

## 2023-08-24 NOTE — PROGRESS NOTES
Daily Note     Today's date: 2023  Patient name: Anabel Baird  : 1975  MRN: [id number]  Referring provider: David Harry MD  Dx:   Encounter Diagnosis     ICD-10-CM    1. Chronic pain of right knee  M25.561     G89.29       2. Primary osteoarthritis of right knee  M17.11       3. Radiculopathy, lumbar region  M54.16           Start Time: 1205  Stop Time: 1245  Total time in clinic (min): 40 minutes    Subjective: Patient reports a little pain and stiffness in her R knee prior to therapy. Notes feeling better following therapy. Objective: See treatment diary below    Assessment: Patient was able to complete a good quad set this visit. She had no quad lag during SLR but still required minimal therapist assist to complete it. Her R knee PROM also looked better this visit compared to last.     Plan: Continue per POC. Progress as able. Precautions: Cardiac history, HTN, DM Type II, History of gastric bypass    Manuals         R knee laser?                                                     Neuro Re-Ed             Quad sets 20x5" 20x5" 20x5" 20x5" 20x5"        SLR  nv 1x10 PT (A) 1x10 PT (A) 1x10   1x5  PT (A)          Supine marching   3x10 3x10 3x10        Supine clamshells Red 3x10 Red   3x10 Red   3x10 Red  3x10 Red  3x12        Standing hip abduction  3x10 3x10 on step 3x10  on step 3x10  on step                                               Ther Ex             Recumb bike 2 min 7 min 5 min nv         Heel slides 3x10 3x10 3x10 3x10 3x10        LAQ 3x10 3x10 3x10 3x10 2#  3x10        HS machine  nv 3x10 nv 30#  3x10                                                            Ther Activity             STS  1x10 1x10 with foam on chair and foam to prevent balance 1x10  Foam on chair 2x10  1x5  Foam on chair        Step ups/downs  4"/ 2x10   4" 2x10  4"  2x10        Step up    4"  2x10                                   Gait Training Modalities

## 2023-08-28 ENCOUNTER — OFFICE VISIT (OUTPATIENT)
Dept: PHYSICAL THERAPY | Facility: CLINIC | Age: 48
End: 2023-08-28
Payer: COMMERCIAL

## 2023-08-28 ENCOUNTER — PROCEDURE VISIT (OUTPATIENT)
Dept: OBGYN CLINIC | Facility: HOSPITAL | Age: 48
End: 2023-08-28
Payer: COMMERCIAL

## 2023-08-28 VITALS
HEART RATE: 76 BPM | WEIGHT: 179.4 LBS | SYSTOLIC BLOOD PRESSURE: 132 MMHG | BODY MASS INDEX: 33.01 KG/M2 | DIASTOLIC BLOOD PRESSURE: 83 MMHG | HEIGHT: 62 IN

## 2023-08-28 DIAGNOSIS — M25.561 CHRONIC PAIN OF RIGHT KNEE: ICD-10-CM

## 2023-08-28 DIAGNOSIS — M25.561 CHRONIC PAIN OF RIGHT KNEE: Primary | ICD-10-CM

## 2023-08-28 DIAGNOSIS — M17.11 PRIMARY OSTEOARTHRITIS OF RIGHT KNEE: ICD-10-CM

## 2023-08-28 DIAGNOSIS — M17.11 PRIMARY OSTEOARTHRITIS OF RIGHT KNEE: Primary | ICD-10-CM

## 2023-08-28 DIAGNOSIS — G89.29 CHRONIC PAIN OF RIGHT KNEE: ICD-10-CM

## 2023-08-28 DIAGNOSIS — M54.16 RADICULOPATHY, LUMBAR REGION: ICD-10-CM

## 2023-08-28 DIAGNOSIS — G89.29 CHRONIC PAIN OF RIGHT KNEE: Primary | ICD-10-CM

## 2023-08-28 PROCEDURE — 97112 NEUROMUSCULAR REEDUCATION: CPT

## 2023-08-28 PROCEDURE — 97110 THERAPEUTIC EXERCISES: CPT

## 2023-08-28 PROCEDURE — 97530 THERAPEUTIC ACTIVITIES: CPT

## 2023-08-28 PROCEDURE — 20610 DRAIN/INJ JOINT/BURSA W/O US: CPT

## 2023-08-28 RX ORDER — HYALURONATE SODIUM 10 MG/ML
20 SYRINGE (ML) INTRAARTICULAR
Status: COMPLETED | OUTPATIENT
Start: 2023-08-28 | End: 2023-08-28

## 2023-08-28 RX ADMIN — Medication 20 MG: at 13:00

## 2023-08-28 NOTE — PROGRESS NOTES
Daily Note     Today's date: 2023  Patient name: Mirna oRmo  : 1975  MRN: [id number]  Referring provider: Kirt Juarez MD  Dx:   Encounter Diagnosis     ICD-10-CM    1. Chronic pain of right knee  M25.561     G89.29       2. Primary osteoarthritis of right knee  M17.11       3. Radiculopathy, lumbar region  M54.16           Start Time: 1000  Stop Time: 1045  Total time in clinic (min): 45 minutes    Subjective: Patient notes minimal discomfort and stiffness prior to therapy. Notes feeling a bit stronger. Objective: See treatment diary below    Assessment: Quad set contraction has been inconsistent secondary to pain. Last visit she completed a SLR with no quad lag, however, this visit she did have a quad lag. Able to progress reps for other exercises with no increased pain. Patient had soreness following therapy. Continue to progress as able. Plan: Continue per POC. Progress as able. Precautions: Cardiac history, HTN, DM Type II, History of gastric bypass    Manuals        R knee laser?                                                     Neuro Re-Ed             Quad sets 20x5" 20x5" 20x5" 20x5" 20x5" 20x5"       SLR  nv 1x10 PT (A) 1x10 PT (A) 1x10   1x5  PT (A)   3x5  PT (A)       Supine marching   3x10 3x10 3x10 3x10 1#      Supine clamshells Red 3x10 Red   3x10 Red   3x10 Red  3x10 Red  3x12 Red  3x12       Standing hip abduction  3x10 3x10 on step 3x10  on step 3x10  on step 3x12  on step       Standing hip flexion      2x10                                 Ther Ex             Recumb bike 2 min 7 min 5 min nv         Heel slides 3x10 3x10 3x10 3x10 3x10 3x10       LAQ 3x10 3x10 3x10 3x10 2#  3x10 3#  3x10       HS machine  nv 3x10 nv 30#  3x10 40#  3x10                                                           Ther Activity             STS  1x10 1x10 with foam on chair and foam to prevent balance 1x10  Foam on chair 2x10  1x5  Foam on chair 3x10  Foam on chair       Step ups/downs  4"/ 2x10   4" 2x10  4"  2x10 4"  1x10    6"  1x10       Step up    4"  2x10                                   Gait Training                                       Modalities

## 2023-08-28 NOTE — PROGRESS NOTES
Assessment:   Diagnosis ICD-10-CM Associated Orders   1. Primary osteoarthritis of right knee  M17.11 Large joint arthrocentesis: R knee      2. Chronic pain of right knee  M25.561 Large joint arthrocentesis: R knee    G89.29           Plan:  · The patient underwent her second in a 3-shot series of right knee Euflexxa injections at today's visit. The procedure was tolerated well without any immediate complications. · I advised rest, ice, compression, and elevation for the next 24 hours to control pain and swelling. · Follow-up in 1 week for next injection. To do next visit:  Follow-up in 1 week for third and final right knee Euflexxa injection. The above stated was discussed in layman's terms and the patient expressed understanding. All questions were answered to the patient's satisfaction. Subjective: Aurea Finley is a 46 y.o. female who presents to the office today for her second in a 3-shot series of right knee Euflexxa injections. She states that her pain today is a 4/10 and increases with certain movements. She has no issues with the injection last week aside from some pain and fullness in the knee. She is ready for further injection today.       Review of systems negative unless otherwise specified in HPI    Past Medical History:   Diagnosis Date   • CPAP (continuous positive airway pressure) dependence    • Diabetes mellitus (HCC)     Type 2   • GERD (gastroesophageal reflux disease)    • Heart murmur    • Hypertension    • Seasonal allergies    • Sleep apnea        Past Surgical History:   Procedure Laterality Date   •  SECTION  10/04/2005   • [place] LAPS GSTR RSTCV PX W/BYP GRUPO-EN-Y LIMB <150 CM N/A 2022    Procedure: BYPASS GASTRIC  R-N-Y  INTRAOPERATIVE EGD;;  Surgeon: Devon Houston MD;  Location: [facility];  Service: Bariatrics   • TUBAL LIGATION         Family History   Problem Relation Age of Onset   • Hypertension Mother    • Diabetes Mother    • Hypertension Father    • Diabetes Father    • Cancer Maternal Grandmother    • Diabetes Maternal Grandfather        Social History     Occupational History   • Occupation: Behavioral health field   Tobacco Use   • Smoking status: Never   • Smokeless tobacco: Never   Vaping Use   • Vaping Use: Never used   Substance and Sexual Activity   • Alcohol use: Never   • Drug use: Never   • Sexual activity: Not Currently     Birth control/protection: Female Sterilization         Current Outpatient Medications:   •  Diclofenac Sodium (VOLTAREN) 1 %, Apply 2 g topically 4 (four) times a day, Disp: 50 g, Rfl: 1  •  Multiple Vitamins-Minerals (Womens Multi) CAPS, Take by mouth, Disp: , Rfl:   •  omeprazole (PriLOSEC) 20 mg delayed release capsule, TAKE 1 CAPSULE BY MOUTH EVERY DAY, Disp: 90 capsule, Rfl: 2  •  triamcinolone (KENALOG) 0.025 % cream, Apply topically 2 (two) times a day as needed for rash, Disp: 15 g, Rfl: 0  •  polyethylene glycol (GLYCOLAX) 17 GM/SCOOP powder, , Disp: , Rfl:   •  polyethylene glycol (MIRALAX) 17 g packet, Take 17 g by mouth every other day, Disp: 30 each, Rfl: 2    No Known Allergies         Vitals:    08/28/23 1305   BP: 132/83   Pulse: 76       Objective:    General:  Patient is WDWN, alert and oriented, appears stated age, and is in no acute distress. Musculoskeletal:    Right Knee: Inspection:  The knee is without erythema or purulence indicative of infection which would preclude the patient from receiving a joint injection at today's visit. Diagnostics, reviewed and taken today if performed as documented:    None performed        Procedures, if performed today:    Large joint arthrocentesis: R knee  Universal Protocol:  Consent: Verbal consent obtained. Consent given by: patient  Time out: Immediately prior to procedure a "time out" was called to verify the correct patient, procedure, equipment, support staff and site/side marked as required.   Patient understanding: patient states understanding of the procedure being performed  Patient consent: the patient's understanding of the procedure matches consent given  Procedure consent: procedure consent matches procedure scheduled  Site marked: the operative site was marked  Supporting Documentation  Indications: pain   Procedure Details  Location: knee - R knee  Preparation: Patient was prepped and draped in the usual sterile fashion  Needle size: 22 G  Ultrasound guidance: no  Approach: anterolateral  Medications administered: 20 mg Sodium Hyaluronate (Viscosup) 20 MG/2ML    Patient tolerance: patient tolerated the procedure well with no immediate complications  Dressing:  Sterile dressing applied        Portions of the record may have been created with voice recognition software. Occasional wrong word or "sound a like" substitutions may have occurred due to the inherent limitations of voice recognition software. Read the chart carefully and recognize, using context, where substitutions have occurred.

## 2023-08-30 ENCOUNTER — OFFICE VISIT (OUTPATIENT)
Dept: PHYSICAL THERAPY | Facility: CLINIC | Age: 48
End: 2023-08-30
Payer: COMMERCIAL

## 2023-08-30 DIAGNOSIS — M17.11 PRIMARY OSTEOARTHRITIS OF RIGHT KNEE: ICD-10-CM

## 2023-08-30 DIAGNOSIS — M25.561 CHRONIC PAIN OF RIGHT KNEE: Primary | ICD-10-CM

## 2023-08-30 DIAGNOSIS — M54.16 RADICULOPATHY, LUMBAR REGION: ICD-10-CM

## 2023-08-30 DIAGNOSIS — G89.29 CHRONIC PAIN OF RIGHT KNEE: Primary | ICD-10-CM

## 2023-08-30 PROCEDURE — 97530 THERAPEUTIC ACTIVITIES: CPT

## 2023-08-30 PROCEDURE — 97110 THERAPEUTIC EXERCISES: CPT

## 2023-08-30 PROCEDURE — 97112 NEUROMUSCULAR REEDUCATION: CPT

## 2023-08-30 NOTE — PROGRESS NOTES
Daily Note     Today's date: 2023  Patient name: Aurea Finley  : 1975  MRN: [id number]  Referring provider: Aura Hutton MD  Dx:   Encounter Diagnosis     ICD-10-CM    1. Chronic pain of right knee  M25.561     G89.29       2. Primary osteoarthritis of right knee  M17.11       3. Radiculopathy, lumbar region  M54.16           Start Time: 1130  Stop Time: 1210  Total time in clinic (min): 40 minutes    Subjective: Patient reports receiving her second injection on Monday. She notes the sight feels really sore. Otherwise, she has no complaints. Objective: See treatment diary below    Assessment: Patient did have a quad lag during SLR this visit secondary to soreness. Showing progress with strength for other exercises. Patient had soreness following therapy. Continue to progress as able. Plan: Continue per POC. Progress as able. Precautions: Cardiac history, HTN, DM Type II, History of gastric bypass    Manuals       R knee laser?                                                     Neuro Re-Ed             Quad sets 20x5" 20x5" 20x5" 20x5" 20x5" 20x5" 20x5"      SLR  nv 1x10 PT (A) 1x10 PT (A) 1x10   1x5  PT (A)   3x5  PT (A) 3x5  PT (A)      Supine marching   3x10 3x10 3x10 3x10 1#  3x10      Supine clamshells Red 3x10 Red   3x10 Red   3x10 Red  3x10 Red  3x12 Red  3x12 Red  3x15      Standing hip abduction  3x10 3x10 on step 3x10  on step 3x10  on step 3x12  on step 3x12  on step      Standing hip flexion      2x10 3x10                                Ther Ex             Recumb bike 2 min 7 min 5 min nv         Heel slides 3x10 3x10 3x10 3x10 3x10 3x10       LAQ 3x10 3x10 3x10 3x10 2#  3x10 3#  3x10 4#  3x10      HS machine  nv 3x10 nv 30#  3x10 40#  3x10 50#  3x10                                                          Ther Activity             STS  1x10 1x10 with foam on chair and foam to prevent balance 1x10  Foam on chair 2x10  1x5  Foam on chair 3x10  Foam on chair 3x10  Foam on chair      Step ups/downs  4"/ 2x10   4" 2x10  4"  2x10 4"  1x10    6"  1x10 6"  2x10      Step up    4"  2x10                                   Gait Training                                       Modalities

## 2023-09-05 ENCOUNTER — PROCEDURE VISIT (OUTPATIENT)
Dept: OBGYN CLINIC | Facility: HOSPITAL | Age: 48
End: 2023-09-05
Payer: COMMERCIAL

## 2023-09-05 VITALS
BODY MASS INDEX: 32.76 KG/M2 | HEART RATE: 89 BPM | WEIGHT: 178 LBS | HEIGHT: 62 IN | SYSTOLIC BLOOD PRESSURE: 133 MMHG | DIASTOLIC BLOOD PRESSURE: 84 MMHG

## 2023-09-05 DIAGNOSIS — G89.29 CHRONIC PAIN OF RIGHT KNEE: ICD-10-CM

## 2023-09-05 DIAGNOSIS — M25.561 CHRONIC PAIN OF RIGHT KNEE: ICD-10-CM

## 2023-09-05 DIAGNOSIS — M17.11 PRIMARY OSTEOARTHRITIS OF RIGHT KNEE: Primary | ICD-10-CM

## 2023-09-05 PROCEDURE — 20610 DRAIN/INJ JOINT/BURSA W/O US: CPT

## 2023-09-05 RX ORDER — HYALURONATE SODIUM 10 MG/ML
20 SYRINGE (ML) INTRAARTICULAR
Status: COMPLETED | OUTPATIENT
Start: 2023-09-05 | End: 2023-09-05

## 2023-09-05 RX ADMIN — Medication 20 MG: at 07:15

## 2023-09-05 NOTE — PROGRESS NOTES
Assessment:   Diagnosis ICD-10-CM Associated Orders   1. Primary osteoarthritis of right knee  M17.11 Large joint arthrocentesis: R knee      2. Chronic pain of right knee  M25.561 Large joint arthrocentesis: R knee    G89.29           Plan:  • The patient underwent her third and final in a 3-shot series of right knee Euflexxa injections at today's visit. The procedure was tolerated well without any immediate complications. • I advised rest, ice, compression, and elevation for the next 24 hours to control pain and swelling. To do next visit:  Follow-up in 3 months to discuss further injections. The above stated was discussed in layman's terms and the patient expressed understanding. All questions were answered to the patient's satisfaction. Subjective: Nick May is a 46 y.o. female who presents to the office today for her third and final in a 3-shot series of right knee Euflexxa injections. She states that her pain today is an 8/10 and increases with certain movements. She has no issues with the injection last week aside from some pain and fullness in the knee. She is ready for further injection today.       Review of systems negative unless otherwise specified in HPI    Past Medical History:   Diagnosis Date   • CPAP (continuous positive airway pressure) dependence    • Diabetes mellitus (HCC)     Type 2   • GERD (gastroesophageal reflux disease)    • Heart murmur    • Hypertension    • Seasonal allergies    • Sleep apnea        Past Surgical History:   Procedure Laterality Date   •  SECTION  10/04/2005   • [place] LAPS GSTR RSTCV PX W/BYP GRUPO-EN-Y LIMB <150 CM N/A 2022    Procedure: BYPASS GASTRIC  R-N-Y  INTRAOPERATIVE EGD;;  Surgeon: Claudell Binder, MD;  Location: [facility] OR;  Service: Bariatrics   • TUBAL LIGATION         Family History   Problem Relation Age of Onset   • Hypertension Mother    • Diabetes Mother    • Hypertension Father    • Diabetes Father    • Cancer Maternal Grandmother    • Diabetes Maternal Grandfather        Social History     Occupational History   • Occupation: Behavioral health field   Tobacco Use   • Smoking status: Never   • Smokeless tobacco: Never   Vaping Use   • Vaping Use: Never used   Substance and Sexual Activity   • Alcohol use: Never   • Drug use: Never   • Sexual activity: Not Currently     Birth control/protection: Female Sterilization         Current Outpatient Medications:   •  Diclofenac Sodium (VOLTAREN) 1 %, Apply 2 g topically 4 (four) times a day, Disp: 50 g, Rfl: 1  •  Multiple Vitamins-Minerals (Womens Multi) CAPS, Take by mouth, Disp: , Rfl:   •  omeprazole (PriLOSEC) 20 mg delayed release capsule, TAKE 1 CAPSULE BY MOUTH EVERY DAY, Disp: 90 capsule, Rfl: 2  •  triamcinolone (KENALOG) 0.025 % cream, Apply topically 2 (two) times a day as needed for rash, Disp: 15 g, Rfl: 0  •  polyethylene glycol (GLYCOLAX) 17 GM/SCOOP powder, , Disp: , Rfl:   •  polyethylene glycol (MIRALAX) 17 g packet, Take 17 g by mouth every other day, Disp: 30 each, Rfl: 2    No Known Allergies         Vitals:    09/05/23 0715   BP: 133/84   Pulse: 89       Objective:    General:  Patient is WDWN, alert and oriented, appears stated age, and is in no acute distress.     Musculoskeletal:     Right Knee:     Inspection:  The knee is without erythema or purulence indicative of infection which would preclude the patient from receiving a joint injection at today's visit. Diagnostics, reviewed and taken today if performed as documented:    None performed        Procedures, if performed today:    Large joint arthrocentesis: R knee  Universal Protocol:  Consent: Verbal consent obtained. Consent given by: patient  Time out: Immediately prior to procedure a "time out" was called to verify the correct patient, procedure, equipment, support staff and site/side marked as required.   Patient understanding: patient states understanding of the procedure being performed  Patient consent: the patient's understanding of the procedure matches consent given  Procedure consent: procedure consent matches procedure scheduled  Site marked: the operative site was marked  Patient identity confirmed: verbally with patient    Supporting Documentation  Indications: pain   Procedure Details  Location: knee - R knee  Preparation: Patient was prepped and draped in the usual sterile fashion  Needle size: 22 G  Ultrasound guidance: no  Approach: anterolateral  Medications administered: 20 mg Sodium Hyaluronate (Viscosup) 20 MG/2ML    Patient tolerance: patient tolerated the procedure well with no immediate complications  Dressing:  Sterile dressing applied        Portions of the record may have been created with voice recognition software. Occasional wrong word or "sound a like" substitutions may have occurred due to the inherent limitations of voice recognition software. Read the chart carefully and recognize, using context, where substitutions have occurred.

## 2023-09-07 ENCOUNTER — OFFICE VISIT (OUTPATIENT)
Dept: PHYSICAL THERAPY | Facility: CLINIC | Age: 48
End: 2023-09-07
Payer: COMMERCIAL

## 2023-09-07 DIAGNOSIS — M25.561 CHRONIC PAIN OF RIGHT KNEE: Primary | ICD-10-CM

## 2023-09-07 DIAGNOSIS — M54.16 RADICULOPATHY, LUMBAR REGION: ICD-10-CM

## 2023-09-07 DIAGNOSIS — M17.11 PRIMARY OSTEOARTHRITIS OF RIGHT KNEE: ICD-10-CM

## 2023-09-07 DIAGNOSIS — G89.29 CHRONIC PAIN OF RIGHT KNEE: Primary | ICD-10-CM

## 2023-09-07 PROCEDURE — 97530 THERAPEUTIC ACTIVITIES: CPT

## 2023-09-07 PROCEDURE — 97110 THERAPEUTIC EXERCISES: CPT

## 2023-09-07 PROCEDURE — 97112 NEUROMUSCULAR REEDUCATION: CPT

## 2023-09-07 NOTE — PROGRESS NOTES
Daily Note     Today's date: 2023  Patient name: Mirna Romo  : 1975  MRN: [id number]  Referring provider: Kirt Juarez MD  Dx:   Encounter Diagnosis     ICD-10-CM    1. Chronic pain of right knee  M25.561     G89.29       2. Primary osteoarthritis of right knee  M17.11       3. Radiculopathy, lumbar region  M54.16           Start Time: 1030  Stop Time: 1110  Total time in clinic (min): 40 minutes    Subjective: Patient reports that her knee has been annoying lately. Notes pain with weight bearing. Notes no pain at rest and "7/10" pain with activity. Objective: See treatment diary below    Assessment: Patient had improved form and pain with RTB around her knees during STS. Challenged with listed TE below noting fatigue. Continue to progress as able. Plan: Continue per POC. Progress as able. Precautions: Cardiac history, HTN, DM Type II, History of gastric bypass    Manuals      R knee laser?                                                     Neuro Re-Ed             Quad sets 20x5" 20x5" 20x5" 20x5" 20x5" 20x5" 20x5" 20x5"     SLR  nv 1x10 PT (A) 1x10 PT (A) 1x10   1x5  PT (A)   3x5  PT (A) 3x5  PT (A) 3x5  PT (A)     Supine marching   3x10 3x10 3x10 3x10 1#  3x10 2#  3x10     Supine clamshells Red 3x10 Red   3x10 Red   3x10 Red  3x10 Red  3x12 Red  3x12 Red  3x15 GTB  3x10     Standing hip abduction  3x10 3x10 on step 3x10  on step 3x10  on step 3x12  on step 3x12  on step 3x15 on step     Standing hip flexion      2x10 3x10 3x10                               Ther Ex             Recumb bike 2 min 7 min 5 min nv    5 min     Heel slides 3x10 3x10 3x10 3x10 3x10 3x10       LAQ 3x10 3x10 3x10 3x10 2#  3x10 3#  3x10 4#  3x10 5#  3x10     HS machine  nv 3x10 nv 30#  3x10 40#  3x10 50#  3x10 50#  3x15                                                         Ther Activity             STS  1x10 1x10 with foam on chair and foam to prevent balance 1x10  Foam on chair 2x10  1x5  Foam on chair 3x10  Foam on chair 3x10  Foam on chair 3x10  Foam on chair  RTB     Step ups/downs  4"/ 2x10   4" 2x10  4"  2x10 4"  1x10    6"  1x10 6"  2x10 6"  2x10     Step up    4"  2x10                                   Gait Training                                       Modalities

## 2023-09-11 ENCOUNTER — EVALUATION (OUTPATIENT)
Dept: PHYSICAL THERAPY | Facility: CLINIC | Age: 48
End: 2023-09-11
Payer: COMMERCIAL

## 2023-09-11 DIAGNOSIS — M17.11 PRIMARY OSTEOARTHRITIS OF RIGHT KNEE: ICD-10-CM

## 2023-09-11 DIAGNOSIS — M25.561 CHRONIC PAIN OF RIGHT KNEE: Primary | ICD-10-CM

## 2023-09-11 DIAGNOSIS — G89.29 CHRONIC PAIN OF RIGHT KNEE: Primary | ICD-10-CM

## 2023-09-11 PROCEDURE — 97110 THERAPEUTIC EXERCISES: CPT

## 2023-09-11 PROCEDURE — 97530 THERAPEUTIC ACTIVITIES: CPT

## 2023-09-11 PROCEDURE — 97112 NEUROMUSCULAR REEDUCATION: CPT

## 2023-09-11 NOTE — PROGRESS NOTES
Daily Note     Today's date: 2023  Patient name: Mirna Romo  : 1975  MRN: [id number]  Referring provider: Kirt Juarez MD  Dx:   Encounter Diagnosis     ICD-10-CM    1. Chronic pain of right knee  M25.561     G89.29       2. Primary osteoarthritis of right knee  M17.11                      Subjective: Patient reports improvement in both pain intensity and ability to perform ADLs. Objective: See treatment diary below    Assessment: Pt continues to have mild valgus deformity at baseline but has much better neuromuscular control of dynamic valgus with STS and functional mobility. Pt continues to have weakness and would benefit from continued strengthening in order to improve ability to navigate stairs    Plan: Continue per POC. Progress as able. Precautions: Cardiac history, HTN, DM Type II, History of gastric bypass    Manuals     R knee laser?                                                     Neuro Re-Ed             Quad sets 20x5" 20x5" 20x5" 20x5" 20x5" 20x5" 20x5" 20x5" 20x5"    SLR  nv 1x10 PT (A) 1x10 PT (A) 1x10   1x5  PT (A)   3x5  PT (A) 3x5  PT (A) 3x5  PT (A) 3x5    Supine marching   3x10 3x10 3x10 3x10 1#  3x10 2#  3x10 2#  3x10    Supine clamshells Red 3x10 Red   3x10 Red   3x10 Red  3x10 Red  3x12 Red  3x12 Red  3x15 GTB  3x10 GTB  3x10    Standing hip abduction  3x10 3x10 on step 3x10  on step 3x10  on step 3x12  on step 3x12  on step 3x15 on step 3x10 LLE, 2x10 RLE    Standing hip flexion      2x10 3x10 3x10 3x10                              Ther Ex             Recumb bike 2 min 7 min 5 min nv    5 min 5 min    Heel slides 3x10 3x10 3x10 3x10 3x10 3x10       LAQ 3x10 3x10 3x10 3x10 2#  3x10 3#  3x10 4#  3x10 5#  3x10 5#  3x10    HS machine  nv 3x10 nv 30#  3x10 40#  3x10 50#  3x10 50#  3x15 50#  3x15                                                        Ther Activity             STS  1x10 1x10 with foam on chair and foam to prevent balance 1x10  Foam on chair 2x10  1x5  Foam on chair 3x10  Foam on chair 3x10  Foam on chair 3x10  Foam on chair  RTB 3x10  Foam on chair  RTB    Step ups/downs  4" 2x10   4" 2x10  4"  2x10 4"  1x10    6"  1x10 6"  2x10 6"  2x10 6"  2x10                              Gait Training                                       Modalities

## 2023-09-13 ENCOUNTER — OFFICE VISIT (OUTPATIENT)
Dept: PHYSICAL THERAPY | Facility: CLINIC | Age: 48
End: 2023-09-13
Payer: COMMERCIAL

## 2023-09-13 DIAGNOSIS — G89.29 CHRONIC PAIN OF RIGHT KNEE: Primary | ICD-10-CM

## 2023-09-13 DIAGNOSIS — M17.11 PRIMARY OSTEOARTHRITIS OF RIGHT KNEE: ICD-10-CM

## 2023-09-13 DIAGNOSIS — M25.561 CHRONIC PAIN OF RIGHT KNEE: Primary | ICD-10-CM

## 2023-09-13 DIAGNOSIS — M54.16 RADICULOPATHY, LUMBAR REGION: ICD-10-CM

## 2023-09-13 PROCEDURE — 97530 THERAPEUTIC ACTIVITIES: CPT

## 2023-09-13 PROCEDURE — 97110 THERAPEUTIC EXERCISES: CPT

## 2023-09-13 PROCEDURE — 97112 NEUROMUSCULAR REEDUCATION: CPT

## 2023-09-13 NOTE — PROGRESS NOTES
Daily Note     Today's date: 2023  Patient name: Basia Aguirre  : 1975  MRN: [id number]  Referring provider: Alireza Garza MD  Dx:   Encounter Diagnosis     ICD-10-CM    1. Chronic pain of right knee  M25.561     G89.29       2. Primary osteoarthritis of right knee  M17.11       3. Radiculopathy, lumbar region  M54.16           Start Time: 1030  Stop Time: 1115  Total time in clinic (min): 45 minutes    Subjective: Patient states "My R knee felt stuck earlier today and then it cracked. It's a bit sore from that". Objective: See treatment diary below    Assessment: Pt continues to have mild valgus deformity at baseline but has much better neuromuscular control of dynamic valgus with STS and functional mobility. Pt continues to have weakness and would benefit from continued strengthening in order to improve ability to navigate stairs. Added IASTM to address continued discomfort. Assess response NV. Plan: Continue per POC. Progress as able.      Precautions: Cardiac history, HTN, DM Type II, History of gastric bypass    Manuals    R knee laser             IASTM R patellar tendon/MCL          5'                             Neuro Re-Ed             Quad sets 20x5" 20x5" 20x5" 20x5" 20x5" 20x5" 20x5" 20x5" 20x5" 20x5"   SLR  nv 1x10 PT (A) 1x10 PT (A) 1x10   1x5  PT (A)   3x5  PT (A) 3x5  PT (A) 3x5  PT (A) 3x5 3x5   Supine marching   3x10 3x10 3x10 3x10 1#  3x10 2#  3x10 2#  3x10 3#  3x10   Supine clamshells Red 3x10 Red   3x10 Red   3x10 Red  3x10 Red  3x12 Red  3x12 Red  3x15 GTB  3x10 GTB  3x10 GTB  3x10   Standing hip abduction  3x10 3x10 on step 3x10  on step 3x10  on step 3x12  on step 3x12  on step 3x15 on step 3x10 LLE, 2x10 RLE 2x10 LLE, 3x10 RLE   Standing hip flexion      2x10 3x10 3x10 3x10 3x10                             Ther Ex             Recumb bike 2 min 7 min 5 min nv    5 min 5 min 5 min   Heel slides 3x10 3x10 3x10 3x10 3x10 3x10 LAQ 3x10 3x10 3x10 3x10 2#  3x10 3#  3x10 4#  3x10 5#  3x10 5#  3x10    HS machine  nv 3x10 nv 30#  3x10 40#  3x10 50#  3x10 50#  3x15 50#  3x15 60#  3x10   Quad ext machine          10#  3x10                                          Ther Activity             STS  1x10 1x10 with foam on chair and foam to prevent balance 1x10  Foam on chair 2x10  1x5  Foam on chair 3x10  Foam on chair 3x10  Foam on chair 3x10  Foam on chair  RTB 3x10  Foam on chair  RTB 3x10  Foam on chair  RTB   Step ups/downs  4" 2x10   4" 2x10  4"  2x10 4"  1x10    6"  1x10 6"  2x10 6"  2x10 6"  2x10 6"  2x10                             Gait Training                                       Modalities

## 2023-09-18 ENCOUNTER — OFFICE VISIT (OUTPATIENT)
Dept: PHYSICAL THERAPY | Facility: CLINIC | Age: 48
End: 2023-09-18
Payer: COMMERCIAL

## 2023-09-18 DIAGNOSIS — G89.29 CHRONIC PAIN OF RIGHT KNEE: Primary | ICD-10-CM

## 2023-09-18 DIAGNOSIS — M54.16 RADICULOPATHY, LUMBAR REGION: ICD-10-CM

## 2023-09-18 DIAGNOSIS — M17.11 PRIMARY OSTEOARTHRITIS OF RIGHT KNEE: ICD-10-CM

## 2023-09-18 DIAGNOSIS — M25.561 CHRONIC PAIN OF RIGHT KNEE: Primary | ICD-10-CM

## 2023-09-18 PROCEDURE — 97112 NEUROMUSCULAR REEDUCATION: CPT

## 2023-09-18 PROCEDURE — 97110 THERAPEUTIC EXERCISES: CPT

## 2023-09-18 PROCEDURE — 97530 THERAPEUTIC ACTIVITIES: CPT

## 2023-09-18 NOTE — PROGRESS NOTES
Daily Note     Today's date: 2023  Patient name: Genesis Qureshi  : 1975  MRN: [id number]  Referring provider: Belkis Vega MD  Dx:   Encounter Diagnosis     ICD-10-CM    1. Chronic pain of right knee  M25.561     G89.29       2. Primary osteoarthritis of right knee  M17.11       3. Radiculopathy, lumbar region  M54.16                      Subjective: Patient reports continued difficulty descending stairs with her right leg secondary to right knee pain. Pt reports decreased pain following IASTM. Objective: See treatment diary below    Assessment: Pt demonstrates continued quad weakness with SLRs but was able to progress glute strengthening today. Mild knee valgus noted with stair training. Plan: Continue per POC. Progress as able.      Precautions: Cardiac history, HTN, DM Type II, History of gastric bypass    Manuals    R knee laser             IASTM R patellar tendon/MCL 5'         5'                             Neuro Re-Ed             Quad sets 20x5"    20x5" 20x5" 20x5" 20x5" 20x5" 20x5"   SLR 3x5    1x10   1x5  PT (A)   3x5  PT (A) 3x5  PT (A) 3x5  PT (A) 3x5 3x5   Supine marching 3#  3x12    3x10 3x10 1#  3x10 2#  3x10 2#  3x10 3#  3x10   Supine clamshells GTB  3x15    Red  3x12 Red  3x12 Red  3x15 GTB  3x10 GTB  3x10 GTB  3x10   Standing hip abduction 2x10 LLE, 3x10 RLE    3x10  on step 3x12  on step 3x12  on step 3x15 on step 3x10 LLE, 2x10 RLE 2x10 LLE, 3x10 RLE   Standing hip flexion 3x10     2x10 3x10 3x10 3x10 3x10                             Ther Ex             Recumb bike        5 min 5 min 5 min   Heel slides     3x10 3x10       LAQ     2#  3x10 3#  3x10 4#  3x10 5#  3x10 5#  3x10    HS machine 60#  3x10    30#  3x10 40#  3x10 50#  3x10 50#  3x15 50#  3x15 60#  3x10   Quad ext machine 10#  3x10         10#  3x10                                          Ther Activity             STS 3x10 foam on chair    2x10  1x5  Foam on chair 3x10  Foam on chair 3x10  Foam on chair 3x10  Foam on chair  RTB 3x10  Foam on chair  RTB 3x10  Foam on chair  RTB   Step ups/downs 6" 2x10    4"  2x10 4"  1x10    6"  1x10 6"  2x10 6"  2x10 6"  2x10 6"  2x10                             Gait Training                                       Modalities

## 2023-09-19 DIAGNOSIS — E66.01 MORBID OBESITY (HCC): ICD-10-CM

## 2023-09-19 RX ORDER — PANTOPRAZOLE SODIUM 40 MG/1
40 TABLET, DELAYED RELEASE ORAL DAILY
Qty: 30 TABLET | Refills: 0 | Status: SHIPPED | OUTPATIENT
Start: 2023-09-19

## 2023-09-19 RX ORDER — OMEPRAZOLE 20 MG/1
20 CAPSULE, DELAYED RELEASE ORAL DAILY
Qty: 90 CAPSULE | Refills: 0 | Status: SHIPPED | OUTPATIENT
Start: 2023-09-19 | End: 2023-09-19

## 2023-09-20 ENCOUNTER — OFFICE VISIT (OUTPATIENT)
Dept: PHYSICAL THERAPY | Facility: CLINIC | Age: 48
End: 2023-09-20
Payer: COMMERCIAL

## 2023-09-20 DIAGNOSIS — M17.11 PRIMARY OSTEOARTHRITIS OF RIGHT KNEE: ICD-10-CM

## 2023-09-20 DIAGNOSIS — M25.561 CHRONIC PAIN OF RIGHT KNEE: Primary | ICD-10-CM

## 2023-09-20 DIAGNOSIS — M54.16 RADICULOPATHY, LUMBAR REGION: ICD-10-CM

## 2023-09-20 DIAGNOSIS — G89.29 CHRONIC PAIN OF RIGHT KNEE: Primary | ICD-10-CM

## 2023-09-20 PROCEDURE — 97110 THERAPEUTIC EXERCISES: CPT

## 2023-09-20 PROCEDURE — 97112 NEUROMUSCULAR REEDUCATION: CPT

## 2023-09-20 NOTE — PROGRESS NOTES
Daily Note     Today's date: 2023  Patient name: Alex Andrea  : 1975  MRN: [id number]  Referring provider: Dora Guevara MD  Dx:   Encounter Diagnosis     ICD-10-CM    1. Chronic pain of right knee  M25.561     G89.29       2. Primary osteoarthritis of right knee  M17.11       3. Radiculopathy, lumbar region  M54.16           Start Time: 1030  Stop Time: 1110  Total time in clinic (min): 40 minutes    Subjective: Patient reports minimal pain prior to therapy. Objective: See treatment diary below    Assessment: Pt demonstrates continued quad weakness with SLRs but was able to progress reps today. Better knee control noted during sit to stands even without the band. Patient still has very slight knee pain with weight bearing, however, it is improving. Plan: Continue per POC. Progress as able.      Precautions: Cardiac history, HTN, DM Type II, History of gastric bypass    Manuals    R knee laser             IASTM R patellar tendon/MCL 5' 5 min        5'                             Neuro Re-Ed             Quad sets 20x5"    20x5" 20x5" 20x5" 20x5" 20x5" 20x5"   SLR 3x5 2x8   1x10   1x5  PT (A)   3x5  PT (A) 3x5  PT (A) 3x5  PT (A) 3x5 3x5   Supine marching 3#  3x12 4#  3x10   3x10 3x10 1#  3x10 2#  3x10 2#  3x10 3#  3x10   Supine clamshells GTB  3x15 GTB  3x15   Red  3x12 Red  3x12 Red  3x15 GTB  3x10 GTB  3x10 GTB  3x10   Standing hip abduction 2x10 LLE, 3x10 RLE 3x10 ea   3x10  on step 3x12  on step 3x12  on step 3x15 on step 3x10 LLE, 2x10 RLE 2x10 LLE, 3x10 RLE   Standing hip flexion 3x10 3x10    2x10 3x10 3x10 3x10 3x10                             Ther Ex             Recumb bike        5 min 5 min 5 min   Heel slides     3x10 3x10       LAQ     2#  3x10 3#  3x10 4#  3x10 5#  3x10 5#  3x10    HS machine 60#  3x10 60#  3x10   30#  3x10 40#  3x10 50#  3x10 50#  3x15 50#  3x15 60#  3x10   Quad ext machine 10#  3x10 10#  3x10        10#  3x10 Ther Activity             STS 3x10 foam on chair 3x10 foam on chair   2x10  1x5  Foam on chair 3x10  Foam on chair 3x10  Foam on chair 3x10  Foam on chair  RTB 3x10  Foam on chair  RTB 3x10  Foam on chair  RTB   Step ups/downs 6" 2x10 6" 3x10   4"  2x10 4"  1x10    6"  1x10 6"  2x10 6"  2x10 6"  2x10 6"  2x10                             Gait Training                                       Modalities

## 2023-09-25 ENCOUNTER — OFFICE VISIT (OUTPATIENT)
Dept: PHYSICAL THERAPY | Facility: CLINIC | Age: 48
End: 2023-09-25
Payer: COMMERCIAL

## 2023-09-25 DIAGNOSIS — G89.29 CHRONIC PAIN OF RIGHT KNEE: Primary | ICD-10-CM

## 2023-09-25 DIAGNOSIS — M17.11 PRIMARY OSTEOARTHRITIS OF RIGHT KNEE: ICD-10-CM

## 2023-09-25 DIAGNOSIS — M25.561 CHRONIC PAIN OF RIGHT KNEE: Primary | ICD-10-CM

## 2023-09-25 DIAGNOSIS — M54.16 RADICULOPATHY, LUMBAR REGION: ICD-10-CM

## 2023-09-25 PROCEDURE — 97530 THERAPEUTIC ACTIVITIES: CPT

## 2023-09-25 PROCEDURE — 97112 NEUROMUSCULAR REEDUCATION: CPT

## 2023-09-25 PROCEDURE — 97110 THERAPEUTIC EXERCISES: CPT

## 2023-09-25 NOTE — PROGRESS NOTES
Daily Note     Today's date: 2023  Patient name: Tosin Taveras  : 1975  MRN: [id number]  Referring provider: Zaki Patel MD  Dx:   Encounter Diagnosis     ICD-10-CM    1. Chronic pain of right knee  M25.561     G89.29       2. Primary osteoarthritis of right knee  M17.11       3. Radiculopathy, lumbar region  M54.16           Start Time: 1100  Stop Time: 1144  Total time in clinic (min): 44 minutes    Subjective: Patient notes improvement with strength and pain. She states, "I have slight pain with steps and sit to stands if I'm not careful". Objective: See treatment diary below    Assessment: Patient continues to progress well with her strengthening. Great response to IASTM noting very little to no pain at the patellar tendon. Eccentric quad strength and control still needs work to regain full function without pain. Plan: Continue per POC. Progress as able.      Precautions: Cardiac history, HTN, DM Type II, History of gastric bypass    Manuals    R knee laser             IASTM R patellar tendon/MCL 5' 5 min 5 min       5'                             Neuro Re-Ed             [employer] 20x5"    20x5" 20x5" 20x5" 20x5" 20x5" 20x5"   SLR 3x5 2x8 2x10  1x10   1x5  PT (A)   3x5  PT (A) 3x5  PT (A) 3x5  PT (A) 3x5 3x5   Supine marching 3#  3x12 4#  3x10 4#  3x12  3x10 3x10 1#  3x10 2#  3x10 2#  3x10 3#  3x10   Supine clamshells GTB  3x15 GTB  3x15 Blue  3x15  Red  3x12 Red  3x12 Red  3x15 GTB  3x10 GTB  3x10 GTB  3x10   Standing hip abduction 2x10 LLE, 3x10 RLE 3x10 ea 3x10   ea  3x10  on step 3x12  on step 3x12  on step 3x15 on step 3x10 LLE, 2x10 RLE 2x10 LLE, 3x10 RLE   Standing hip flexion 3x10 3x10 3x10   2x10 3x10 3x10 3x10 3x10                             Ther Ex             Recumb bike        5 min 5 min 5 min   Heel slides     3x10 3x10       LAQ     2#  3x10 3#  3x10 4#  3x10 5#  3x10 5#  3x10    HS machine 60#  3x10 60#  3x10 60#  3x10 30#  3x10 40#  3x10 50#  3x10 50#  3x15 50#  3x15 60#  3x10   Quad ext machine 10#  3x10 10#  3x10 15#  3x10       10#  3x10                                          Ther Activity             STS 3x10 foam on chair 3x10 foam on chair 3x10 foam on chair  2x10  1x5  Foam on chair 3x10  Foam on chair 3x10  Foam on chair 3x10  Foam on chair  RTB 3x10  Foam on chair  RTB 3x10  Foam on chair  RTB   Step ups/downs 6" 2x10 6" 3x10 6"   3x10  4"  2x10 4"  1x10    6"  1x10 6"  2x10 6"  2x10 6"  2x10 6"  2x10                             Gait Training                                       Modalities

## 2023-09-27 ENCOUNTER — OFFICE VISIT (OUTPATIENT)
Dept: PHYSICAL THERAPY | Facility: CLINIC | Age: 48
End: 2023-09-27
Payer: COMMERCIAL

## 2023-09-27 DIAGNOSIS — M17.11 PRIMARY OSTEOARTHRITIS OF RIGHT KNEE: ICD-10-CM

## 2023-09-27 DIAGNOSIS — G89.29 CHRONIC PAIN OF RIGHT KNEE: Primary | ICD-10-CM

## 2023-09-27 DIAGNOSIS — M25.561 CHRONIC PAIN OF RIGHT KNEE: Primary | ICD-10-CM

## 2023-09-27 PROCEDURE — 97112 NEUROMUSCULAR REEDUCATION: CPT

## 2023-09-27 PROCEDURE — 97530 THERAPEUTIC ACTIVITIES: CPT

## 2023-09-27 PROCEDURE — 97110 THERAPEUTIC EXERCISES: CPT

## 2023-09-27 NOTE — PROGRESS NOTES
Daily Note     Today's date: 2023  Patient name: Latrice Sheets  : 1975  MRN: [id number]  Referring provider: Claudene Madeira, MD  Dx:   Encounter Diagnosis     ICD-10-CM    1. Chronic pain of right knee  M25.561     G89.29       2. Primary osteoarthritis of right knee  M17.11                      Subjective: Patient reports her knee is feeling much better. She does still get pain with putting all of her weight on her R leg for a long period of time, and states she has to be careful with how she steps when descending stairs because that can cause pain at times as well. Objective: See treatment diary below    Assessment: Added eccentric strengthening on quad ext machine in order to try to improve descending stairs. Pt tolerated fair. Continue to progress strengthening as able. Plan: Continue per POC. Progress as able.      Precautions: Cardiac history, HTN, DM Type II, History of gastric bypass    Manuals    R knee laser             IASTM R patellar tendon/MCL 5' 5 min 5 min       5'                             Neuro Re-Ed             [employer] 20x5"       20x5" 20x5" 20x5"   SLR 3x5 2x8 2x10 2x10    3x5  PT (A) 3x5 3x5   Supine marching 3#  3x12 4#  3x10 4#  3x12 4#  3x10    2#  3x10 2#  3x10 3#  3x10   Supine clamshells GTB  3x15 GTB  3x15 Blue  3x15 Blue  3x15    GTB  3x10 GTB  3x10 GTB  3x10   Standing hip abduction 2x10 LLE, 3x10 RLE 3x10 ea 3x10   ea 3x10   ea    3x15 on step 3x10 LLE, 2x10 RLE 2x10 LLE, 3x10 RLE   Standing hip flexion 3x10 3x10 3x10 3x10 ea    3x10 3x10 3x10                             Ther Ex             Recumb bike        5 min 5 min 5 min   Heel slides             LAQ        5#  3x10 5#  3x10    HS machine 60#  3x10 60#  3x10 60#  3x10 60#  3x10    50#  3x15 50#  3x15 60#  3x10   Quad ext machine 10#  3x10 10#  3x10 15#  3x10 15#  2x10      10#  3x10   DL SL     10#  1x10, 1x5                                   Ther Activity STS 3x10 foam on chair 3x10 foam on chair 3x10 foam on chair 3x10 foam on chair    3x10  Foam on chair  RTB 3x10  Foam on chair  RTB 3x10  Foam on chair  RTB   Step ups/downs 6" 2x10 6" 3x10 6"   3x10 6"   3x10    6"  2x10 6"  2x10 6"  2x10                             Gait Training                                       Modalities

## 2023-10-03 ENCOUNTER — OFFICE VISIT (OUTPATIENT)
Dept: PHYSICAL THERAPY | Facility: CLINIC | Age: 48
End: 2023-10-03
Payer: COMMERCIAL

## 2023-10-03 DIAGNOSIS — M25.561 CHRONIC PAIN OF RIGHT KNEE: Primary | ICD-10-CM

## 2023-10-03 DIAGNOSIS — M17.11 PRIMARY OSTEOARTHRITIS OF RIGHT KNEE: ICD-10-CM

## 2023-10-03 DIAGNOSIS — G89.29 CHRONIC PAIN OF RIGHT KNEE: Primary | ICD-10-CM

## 2023-10-03 PROCEDURE — 97530 THERAPEUTIC ACTIVITIES: CPT

## 2023-10-03 PROCEDURE — 97110 THERAPEUTIC EXERCISES: CPT

## 2023-10-03 PROCEDURE — 97112 NEUROMUSCULAR REEDUCATION: CPT

## 2023-10-03 NOTE — PROGRESS NOTES
Daily Note     Today's date: 10/3/2023  Patient name: Michoacano Gray  : 1975  MRN: [id number]  Referring provider: Arlena Alpers, MD  Dx:   Encounter Diagnosis     ICD-10-CM    1. Chronic pain of right knee  M25.561     G89.29       2. Primary osteoarthritis of right knee  M17.11                      Subjective: Patient reports knee is feeling better. Not having as much pain as initially. Continue difficulty with prolonged standing activities. Objective: See treatment diary below    Assessment: Patient progressing with strengthening to good tolerance. Decreased eccentric control with forward step downs. Appropriate fatigue noted with exercises. Progress with strengthening as able. Plan: Continue per POC. Progress as able.      Precautions: Cardiac history, HTN, DM Type II, History of gastric bypass    Manuals 9/18 9/20 9/25 9/27 10/3   9/7 9/11 9/13   R knee laser             IASTM R patellar tendon/MCL 5' 5 min 5 min       5'                             Neuro Re-Ed             [employer] 20x5"       20x5" 20x5" 20x5"   SLR 3x5 2x8 2x10 2x10 2x10   3x5  PT (A) 3x5 3x5   Supine marching 3#  3x12 4#  3x10 4#  3x12 4#  3x10 4# 3x10   2#  3x10 2#  3x10 3#  3x10   Supine clamshells GTB  3x15 GTB  3x15 Blue  3x15 Blue  3x15 Blue  3x15   GTB  3x10 GTB  3x10 GTB  3x10   Standing hip abduction 2x10 LLE, 3x10 RLE 3x10 ea 3x10   ea 3x10   ea 3x10 ea   3x15 on step 3x10 LLE, 2x10 RLE 2x10 LLE, 3x10 RLE   Standing hip flexion 3x10 3x10 3x10 3x10 ea 3x10 ea   3x10 3x10 3x10                             Ther Ex             Recumb bike        5 min 5 min 5 min   Heel slides             LAQ        5#  3x10 5#  3x10    HS machine 60#  3x10 60#  3x10 60#  3x10 60#  3x10 60#  3x10   50#  3x15 50#  3x15 60#  3x10   Quad ext machine 10#  3x10 10#  3x10 15#  3x10 15#  2x10 15#  2x10     10#  3x10   DL SL     10#  1x10, 1x5 10# 1x10  1x5                                  Ther Activity             STS 3x10 foam on chair 3x10 foam on chair 3x10 foam on chair 3x10 foam on chair 3x10 foam on chair   3x10  Foam on chair  RTB 3x10  Foam on chair  RTB 3x10  Foam on chair  RTB   Step ups/downs 6" 2x10 6" 3x10 6"   3x10 6"   3x10 6" 3x10   6"  2x10 6"  2x10 6"  2x10                             Gait Training                                       Modalities

## 2023-10-10 ENCOUNTER — OFFICE VISIT (OUTPATIENT)
Dept: PHYSICAL THERAPY | Facility: CLINIC | Age: 48
End: 2023-10-10
Payer: COMMERCIAL

## 2023-10-10 DIAGNOSIS — M25.561 CHRONIC PAIN OF RIGHT KNEE: Primary | ICD-10-CM

## 2023-10-10 DIAGNOSIS — M17.11 PRIMARY OSTEOARTHRITIS OF RIGHT KNEE: ICD-10-CM

## 2023-10-10 DIAGNOSIS — G89.29 CHRONIC PAIN OF RIGHT KNEE: Primary | ICD-10-CM

## 2023-10-10 PROCEDURE — 97112 NEUROMUSCULAR REEDUCATION: CPT

## 2023-10-10 PROCEDURE — 97110 THERAPEUTIC EXERCISES: CPT

## 2023-10-10 PROCEDURE — 97530 THERAPEUTIC ACTIVITIES: CPT

## 2023-10-10 NOTE — PROGRESS NOTES
Daily Note     Today's date: 10/10/2023  Patient name: Melissa Campa  : 1975  MRN: [id number]  Referring provider: Lizette Romero MD  Dx:   Encounter Diagnosis     ICD-10-CM    1. Chronic pain of right knee  M25.561     G89.29       2. Primary osteoarthritis of right knee  M17.11                      Subjective: Patient reports no complaints functionally and that her pain is nearly gone. Objective: See treatment diary below    Assessment: Pt has achieved all functional goals set at [facility]. Pt presents no complaints, is independent with [facility] and appropriate for d/c at this time.      Plan: D/c to [facility]     Precautions: Cardiac history, HTN, DM Type II, History of gastric bypass    Manuals 9/18 9/20 9/25 9/27 10/3 10/10  9/7 9/11 9/13   R knee laser             IASTM R patellar tendon/MCL 5' 5 min 5 min       5'                             Neuro Re-Ed             [employer] 20x5"       20x5" 20x5" 20x5"   SLR 3x5 2x8 2x10 2x10 2x10 2x10  3x5  PT (A) 3x5 3x5   Supine clamshells GTB  3x15 GTB  3x15 Blue  3x15 Blue  3x15 Blue  3x15 Blue  3x15  GTB  3x10 GTB  3x10 GTB  3x10   Standing hip abduction 2x10  3x10 ea 3x10   ea 3x10   ea 3x10 ea 3x10 ea       Standing hip flexion marching      3x10 ea  3x10 3x10 3x10                             Ther Ex             Recumb bike      5 min  5 min 5 min 5 min   Heel slides             LAQ        5#  3x10 5#  3x10    HS machine 60#  3x10 60#  3x10 60#  3x10 60#  3x10 60#  3x10 60#  3x10  50#  3x15 50#  3x15 60#  3x10   Quad ext machine DL/SL ecc lowering      10# 3x10    10#  3x10                                          Ther Activity             STS 3x10 foam on chair 3x10 foam on chair 3x10 foam on chair 3x10 foam on chair 3x10 foam on chair 3x10 foam on chair  3x10  Foam on chair  RTB 3x10  Foam on chair  RTB 3x10  Foam on chair  RTB   Step ups/downs 6" 2x10 6" 3x10 6"   3x10 6"   3x10 6" 3x10 6" 3x10  6"  2x10 6"  2x10 6"  2x10                             Gait Training Modalities

## 2023-12-11 ENCOUNTER — OFFICE VISIT (OUTPATIENT)
Dept: OBGYN CLINIC | Facility: HOSPITAL | Age: 48
End: 2023-12-11
Payer: COMMERCIAL

## 2023-12-11 VITALS
SYSTOLIC BLOOD PRESSURE: 135 MMHG | DIASTOLIC BLOOD PRESSURE: 82 MMHG | WEIGHT: 158 LBS | HEART RATE: 75 BPM | HEIGHT: 62 IN | BODY MASS INDEX: 29.08 KG/M2

## 2023-12-11 DIAGNOSIS — M17.11 PRIMARY OSTEOARTHRITIS OF RIGHT KNEE: Primary | ICD-10-CM

## 2023-12-11 PROCEDURE — 99213 OFFICE O/P EST LOW 20 MIN: CPT | Performed by: ORTHOPAEDIC SURGERY

## 2023-12-11 NOTE — PROGRESS NOTES
Assessment:   Diagnosis ICD-10-CM Associated Orders   1. Primary osteoarthritis of right knee  M17.11             Plan:  Patient states that she is doing well and declines any injection at this time  Patient will follow-up in 2 months for re-check and re-order of visco supplementation if necessary  Discussed with the patient that she should consider the   Discussed with patient that she will most likely need a total knee arthroplasty at some point in time    To do next visit:  Follow-up in 2 months to discuss further injections and submit prior authorization for visco supplementation if patient elects. The above stated was discussed in layman's terms and the patient expressed understanding. All questions were answered to the patient's satisfaction. Subjective: Latrice Sheets is a 46 y.o. female who presents to the office today for her 3 month follow-up after 3rd and final Euflexxa injection. She states that her pain today is an 5/10 and increases with certain movements. She has no issues with the last injection aside from some pain and fullness in the knee. She states that she has recently started drinking a collagen supplement and she feels that she is doing better. She states that she does not feel that she needs a CS injection at this time.         Review of systems negative unless otherwise specified in HPI    Past Medical History:   Diagnosis Date   • CPAP (continuous positive airway pressure) dependence    • Diabetes mellitus (HCC)     Type 2   • GERD (gastroesophageal reflux disease)    • Heart murmur    • Hypertension    • Seasonal allergies    • Sleep apnea        Past Surgical History:   Procedure Laterality Date   •  SECTION  10/04/2005   • [facility] LAPS GSTR RSTCV PX W/BYP GRUPO-EN-Y LIMB <150 CM N/A 2022    Procedure: BYPASS GASTRIC  R-N-Y  INTRAOPERATIVE EGD;;  Surgeon: Sophia Santoyo MD;  Location: [facility];  Service: Bariatrics   • TUBAL LIGATION         Family History Problem Relation Age of Onset   • Hypertension Mother    • Diabetes Mother    • Hypertension Father    • Diabetes Father    • Cancer Maternal Grandmother    • Diabetes Maternal Grandfather        Social History     Occupational History   • Occupation: Behavioral health field   Tobacco Use   • Smoking status: Never   • Smokeless tobacco: Never   Vaping Use   • Vaping Use: Never used   Substance and Sexual Activity   • Alcohol use: Never   • Drug use: Never   • Sexual activity: Not Currently     Birth control/protection: Female Sterilization         Current Outpatient Medications:   •  Multiple Vitamins-Minerals (Womens Multi) CAPS, Take by mouth, Disp: , Rfl:   •  Diclofenac Sodium (VOLTAREN) 1 %, Apply 2 g topically 4 (four) times a day, Disp: 50 g, Rfl: 1  •  pantoprazole (PROTONIX) 40 mg tablet, Take 1 tablet (40 mg total) by mouth daily, Disp: 30 tablet, Rfl: 0  •  polyethylene glycol (GLYCOLAX) 17 GM/SCOOP powder, , Disp: , Rfl:   •  polyethylene glycol (MIRALAX) 17 g packet, Take 17 g by mouth every other day, Disp: 30 each, Rfl: 2  •  triamcinolone (KENALOG) 0.025 % cream, Apply topically 2 (two) times a day as needed for rash, Disp: 15 g, Rfl: 0    No Known Allergies         Vitals:    12/11/23 1648   BP: 135/82   Pulse: 75       Objective:    General:  Patient is WDWN, alert and oriented, appears stated age, and is in no acute distress.      Musculoskeletal:     Right Knee:  right knee(s) -   Patient ambulates with steady gait pattern  Uses No assistive device  No anatomical deformity  Skin is warm and dry to touch with no signs of erythema, ecchymosis, or infection   No soft tissue swelling or effusion noted  ROM (0° - 110°)   MMT: 5/5 throughout  No tenderness to palpation on exam  Flexor and extensor mechanisms are intact   Knee is stable to varus and valgus stress  Patella tracks centrally without palpable crepitus  Calf compartments are soft and supple  2+ DP and PT pulses with brisk capillary refill to the toes  Sural, saphenous, tibial, superficial, and deep peroneal motor and sensory distributions intact  Sensation light touch intact distally          Diagnostics, reviewed and taken today if performed as documented:    None performed       Procedures, if performed today:    Procedures    Scribe Attestation    I,:  Yash Plaza am acting as a scribe while in the presence of the attending physician.:       I,:  Darcy Nolan MD personally performed the services described in this documentation    as scribed in my presence.:       '    Portions of the record may have been created with voice recognition software. Occasional wrong word or "sound a like" substitutions may have occurred due to the inherent limitations of voice recognition software. Read the chart carefully and recognize, using context, where substitutions have occurred.

## 2023-12-18 ENCOUNTER — OFFICE VISIT (OUTPATIENT)
Dept: BARIATRICS | Facility: CLINIC | Age: 48
End: 2023-12-18
Payer: COMMERCIAL

## 2023-12-18 VITALS
TEMPERATURE: 97 F | BODY MASS INDEX: 28.71 KG/M2 | HEART RATE: 107 BPM | HEIGHT: 62 IN | SYSTOLIC BLOOD PRESSURE: 130 MMHG | DIASTOLIC BLOOD PRESSURE: 70 MMHG | WEIGHT: 156 LBS

## 2023-12-18 DIAGNOSIS — Z98.84 BARIATRIC SURGERY STATUS: ICD-10-CM

## 2023-12-18 DIAGNOSIS — E83.52 SERUM CALCIUM ELEVATED: ICD-10-CM

## 2023-12-18 DIAGNOSIS — Z48.815 ENCOUNTER FOR SURGICAL AFTERCARE FOLLOWING SURGERY OF DIGESTIVE SYSTEM: Primary | ICD-10-CM

## 2023-12-18 DIAGNOSIS — K91.2 POSTSURGICAL MALABSORPTION: ICD-10-CM

## 2023-12-18 DIAGNOSIS — L98.7 EXCESS SKIN: ICD-10-CM

## 2023-12-18 PROCEDURE — 99214 OFFICE O/P EST MOD 30 MIN: CPT | Performed by: NURSE PRACTITIONER

## 2023-12-18 NOTE — PROGRESS NOTES
Assessment/Plan:     Patient ID: Pamella Rm is a 48 y.o. female.     Bariatric Surgery Status    -s/p Merry-En-Y Gastric Bypass with Dr. Roberts on 11/28/2022. Presents to the office today for annual visit. Overall doing well, tolerating a regular diet. Denies having any abdominal pain, N/V/D/C, regurgitation, reflux or dysphagia. Taking her MVI daily. Lost an additional 33.5 lbs since last office visit. Still having knee pain - has been inhibiting her from exercise. Unable to get a knee replacement; as of now, she is doing PT.     PLAN:     - Routine follow up in 6 months for 18 month PO visit.   - Continue with healthy lifestyle, adequate protein intake of 60 gm, fluid intake of at least 64 oz.   - Continue with MVI daily.   - Activity as tolerated.   - Labs ordered and will adjust accordingly if any deficiency.   - Follow up with RD and SW as needed.     Elevated Calcium - per last labs, calcium was elevated, hence she stopped her protein shakes and her calcium supplements. She wishes to go back on her protein shakes. Will have repeat levels and will advise to add her protein shakes for now if levels have normalized.     Excess skin - of abdominal region, bilateral arms and and inner thighs. Since bariatric surgery, has lost almost 150 lbs. Believes excess skin causes her legs to feel heavier hence worsening knee pain.   - Discuss best to wait until 18 months post op to consider skin removal surgery.   - compression leggings recommended.   - Plan to refer to plastics at next visit.     Continued/Maintain healthy weight loss with good nutrition intakes.  Adequate hydration with at least 64oz. fluid intake.  Follow diet as discussed.  Follow vitamin and mineral recommendations as reviewed with you.  Exercise as tolerated.    Colonoscopy referral made: Will discuss with PCP  Mammogram - Due - encouraged to get done.     Follow-up in 6 months for 18 month post op visit. We kindly ask that your arrive 15 minutes  before your scheduled appointment time with your provider to allow our staff to room you, get your vital signs and update your chart.    Get lab work done prior to visit. Please call the office if you need a script.  It is recommended to check with your insurance BEFORE getting labs done to make sure they are covered by your policy.      Call our office if you have any problems with abdominal pain especially associated with fever, chills, nausea, vomiting or any other concerns.    All  Post-bariatric surgery patients should be aware that very small quantities of any alcohol can cause impairment and it is very possible not to feel the effect. The effect can be in the system for several hours.  It is also a stomach irritant.     It is advised to AVOID alcohol, Nonsteroidal antiinflammatory drugs (NSAIDS) and nicotine of all forms . Any of these can cause stomach irritation/pain.    Discussed the effects of alcohol on a bariatric patient and the increased impairment risk.     Keep up the good work!     Postsurgical Malabsorption   -At risk for malabsorption of vitamins/minerals secondary to malabsorption and restriction of intake from bariatric surgery  -Currently taking adequate postop bariatric surgery vitamin supplementation  -Next set of bariatric labs ordered for approximately 2 weeks  -Patient received education about the importance of adhering to a lifelong supplementation regimen to avoid vitamin/mineral deficiencies      Diagnoses and all orders for this visit:    Encounter for surgical aftercare following surgery of digestive system  -     CBC and differential; Future  -     Comprehensive metabolic panel; Future  -     Folate; Future  -     Ferritin; Future  -     PTH, intact; Future  -     TIBC Panel (incl. Iron, TIBC, % Iron Saturation); Future  -     Vitamin B1, whole blood; Future  -     Vitamin A; Future  -     Vitamin B12; Future  -     Vitamin D 25 hydroxy; Future  -     Zinc; Future    Bariatric  surgery status  -     CBC and differential; Future  -     Comprehensive metabolic panel; Future  -     Folate; Future  -     Ferritin; Future  -     PTH, intact; Future  -     TIBC Panel (incl. Iron, TIBC, % Iron Saturation); Future  -     Vitamin B1, whole blood; Future  -     Vitamin A; Future  -     Vitamin B12; Future  -     Vitamin D 25 hydroxy; Future  -     Zinc; Future    Postsurgical malabsorption  -     CBC and differential; Future  -     Comprehensive metabolic panel; Future  -     Folate; Future  -     Ferritin; Future  -     PTH, intact; Future  -     TIBC Panel (incl. Iron, TIBC, % Iron Saturation); Future  -     Vitamin B1, whole blood; Future  -     Vitamin A; Future  -     Vitamin B12; Future  -     Vitamin D 25 hydroxy; Future  -     Zinc; Future    BMI 29.0-29.9,adult  -     CBC and differential; Future  -     Comprehensive metabolic panel; Future  -     Folate; Future  -     Ferritin; Future  -     PTH, intact; Future  -     TIBC Panel (incl. Iron, TIBC, % Iron Saturation); Future  -     Vitamin B1, whole blood; Future  -     Vitamin A; Future  -     Vitamin B12; Future  -     Vitamin D 25 hydroxy; Future  -     Zinc; Future    Serum calcium elevated    Excess skin         Subjective:      Patient ID: Paemlla Rm is a 48 y.o. female.    -s/p Merry-En-Y Gastric Bypass with Dr. Roberts on 11/28/2022. Presents to the office today for annual visit. Overall doing well, tolerating a regular diet. Denies having any abdominal pain, N/V/D/C, regurgitation, reflux or dysphagia. Taking her MVI daily. Lost an additional 33.5 lbs since last office visit. Still having knee pain - has been inhibiting her from exercise. Unable to get a knee replacement; as of now, she is doing PT.     Initial: 299 lbs   Current: 156 lbs   EWL: (Weight loss is ahead of schedule at this post surgical period.)  Vineet: current   Current BMI is Body mass index is 29 kg/m².    Tolerating a regular diet-yes  Eating at least 60 grams of  "protein per day-yes  Following 30/60 minute rule with liquids-yes  Drinking at least 64 ounces of fluid per day-yes  Drinking carbonated beverages-no  Sufficient exercise-no - limited due to her knee pain ; she is doing PT.   Using NSAIDs regularly-no  Using nicotine-no  Using alcohol-no  Supplements: Multivitamins and Iron - fusion; collagen.     EWL is 86%, which places the patient ahead of schedule for expected post surgical weight loss at this time.     The following portions of the patient's history were reviewed and updated as appropriate: allergies, current medications, past family history, past medical history, past social history, past surgical history and problem list.    Review of Systems   Constitutional: Negative.    Respiratory: Negative.     Cardiovascular: Negative.    Gastrointestinal: Negative.    Musculoskeletal:  Positive for arthralgias and joint swelling.   Neurological: Negative.    Psychiatric/Behavioral: Negative.           Objective:    /70   Pulse (!) 107   Temp (!) 97 °F (36.1 °C) (Tympanic)   Ht 5' 1.5\" (1.562 m)   Wt 70.8 kg (156 lb)   BMI 29.00 kg/m²      Physical Exam  Vitals and nursing note reviewed.   Constitutional:       Appearance: Normal appearance.   Cardiovascular:      Rate and Rhythm: Normal rate and regular rhythm.      Pulses: Normal pulses.      Heart sounds: Normal heart sounds.   Pulmonary:      Effort: Pulmonary effort is normal.      Breath sounds: Normal breath sounds.   Abdominal:      General: Bowel sounds are normal.      Palpations: Abdomen is soft.      Tenderness: There is no abdominal tenderness.   Musculoskeletal:         General: Normal range of motion.   Skin:     General: Skin is warm and dry.   Neurological:      General: No focal deficit present.      Mental Status: She is alert and oriented to person, place, and time.   Psychiatric:         Mood and Affect: Mood normal.         Behavior: Behavior normal.         Thought Content: Thought " content normal.         Judgment: Judgment normal.

## 2023-12-19 ENCOUNTER — OFFICE VISIT (OUTPATIENT)
Dept: FAMILY MEDICINE CLINIC | Facility: CLINIC | Age: 48
End: 2023-12-19

## 2023-12-19 VITALS
HEIGHT: 61 IN | SYSTOLIC BLOOD PRESSURE: 124 MMHG | BODY MASS INDEX: 29.27 KG/M2 | HEART RATE: 85 BPM | TEMPERATURE: 98 F | OXYGEN SATURATION: 100 % | DIASTOLIC BLOOD PRESSURE: 80 MMHG | WEIGHT: 155 LBS

## 2023-12-19 DIAGNOSIS — I10 ESSENTIAL HYPERTENSION: Primary | ICD-10-CM

## 2023-12-19 DIAGNOSIS — M17.11 PRIMARY OSTEOARTHRITIS OF RIGHT KNEE: ICD-10-CM

## 2023-12-19 DIAGNOSIS — M25.512 ACUTE PAIN OF LEFT SHOULDER: ICD-10-CM

## 2023-12-19 DIAGNOSIS — Z12.11 COLON CANCER SCREENING: ICD-10-CM

## 2023-12-19 DIAGNOSIS — E11.9 TYPE 2 DIABETES MELLITUS WITHOUT COMPLICATION, WITHOUT LONG-TERM CURRENT USE OF INSULIN (HCC): ICD-10-CM

## 2023-12-19 DIAGNOSIS — Z13.6 SCREENING FOR CARDIOVASCULAR CONDITION: ICD-10-CM

## 2023-12-19 DIAGNOSIS — E05.90 SUBCLINICAL HYPERTHYROIDISM: ICD-10-CM

## 2023-12-19 PROBLEM — M25.511 ACUTE PAIN OF RIGHT SHOULDER: Status: ACTIVE | Noted: 2023-12-19

## 2023-12-19 PROCEDURE — 99214 OFFICE O/P EST MOD 30 MIN: CPT | Performed by: FAMILY MEDICINE

## 2023-12-19 NOTE — PROGRESS NOTES
Name: Pamella Rm      : 1975      MRN: [id number]  Encounter Provider: Pita Mora DO  Encounter Date: 2023   Encounter department: [facility]    Assessment & Plan     1. Essential hypertension  Assessment & Plan:  /80.  Pt stopped HTN meds (lisinopril and HCTZ) around time of bariatric surgery.   BP at goal of <130/90 off of anti-HTN meds.     - continue life style modifications and off of anti HTN medications.   - f/u in 6 months.     Orders:  -     Albumin / creatinine urine ratio; Future    2. Colon cancer screening  -     Ambulatory Referral to Gastroenterology; Future    3. Subclinical hyperthyroidism  Assessment & Plan:  Will reorder TSH and T4 with annual labs. No hypothyroid symptoms.     Orders:  -     TSH, 3rd generation with Free T4 reflex; Future    4. Type 2 diabetes mellitus without complication, without long-term current use of insulin (HCC)  -     Hemoglobin A1C; Future    5. Screening for cardiovascular condition  -     Lipid panel; Future    6. Acute pain of left shoulder  Assessment & Plan:  Right shoulder pain at anterior shoulder and 3-4 inches distal for 1 week. No inciting injury. Yergason's test positive. Pt has had similar symptoms in the past.     Likely biceps tendinitis.     Plan  - educated on this overuse injury.   - rest, ice, compression discussed.  - Tylenol max 3 g for pain  - F/u in 2 weeks for annual physical and left shoulder pain      7. Primary osteoarthritis of right knee  Assessment & Plan:  Right knee pain .   Physical exam unremarkable except for anterio-lateral patellar tenderness. Xray shows OA.   S/p 3 visco injections and PT    Plan  - continue to follow with ortho.   - Encouraged and demonstrated home quadriceps exercises  - lidocaine gel  - tylenol 3g max. Avoid NSAID 2/2 hx of Bariatric surgery          Depression Screening and Follow-up Plan: Patient was screened for depression during today's  encounter. They screened negative with a PHQ-2 score of 0.    Return in about 2 weeks (around 2024) for Annual physical 2.      Subjective     HPI  Pamella Rm is 48 y.o. here for 6 month follow up of chronic issues. Pt has PMH of Bariatric surgery on , HTN, T2DM, right knee OA. Pt feels overall well and is happy with her weight loss journey. She met with Bariatric surgery for one year follow up yesterday. Pt has complaint of Right knee pain and left shoulder pain.     Pt has chronic right knee pain due to right knee OA. Pain improved with PT but she has completed PT at this time and sufferers with pain with prolonged standing or walking, quick movements, and stairs.    Pt has left shoulder pain for 1 week. No injury or inciting event.     Past Medical History:   Diagnosis Date    CPAP (continuous positive airway pressure) dependence     Diabetes mellitus (HCC)     Type 2    GERD (gastroesophageal reflux disease)     Heart murmur     Hypertension     Seasonal allergies     Sleep apnea      Past Surgical History:   Procedure Laterality Date     SECTION  10/04/2005    MA LAPS GSTR RSTCV PX W/BYP GRUPO-EN-Y LIMB <150 CM N/A 2022    Procedure: BYPASS GASTRIC  R-N-Y  INTRAOPERATIVE EGD;;  Surgeon: Harrison Roberts MD;  Location: [facility];  Service: Bariatrics    TUBAL LIGATION       Family History   Problem Relation Age of Onset    Hypertension Mother     Diabetes Mother     Hypertension Father     Diabetes Father     Cancer Maternal Grandmother     Diabetes Maternal Grandfather      Social History     Socioeconomic History    Marital status: Single     Spouse name: None    Number of children: 3    Years of education: None    Highest education level: None   Occupational History    Occupation: Behavioral health field   Tobacco Use    Smoking status: Never    Smokeless tobacco: Never   Vaping Use    Vaping status: Never Used   Substance and Sexual Activity    Alcohol use: Never    Drug  use: Never    Sexual activity: Not Currently     Birth control/protection: Female Sterilization   Other Topics Concern    None   Social History Narrative    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     Social Determinants of Health     Financial Resource Strain: Low Risk  (12/19/2023)    Overall Financial Resource Strain (CARDIA)     Difficulty of Paying Living Expenses: Not hard at all   Food Insecurity: No Food Insecurity (12/19/2023)    Hunger Vital Sign     Worried About Running Out of Food in the Last Year: Never true     Ran Out of Food in the Last Year: Never true   Transportation Needs: No Transportation Needs (12/19/2023)    PRAPARE - Transportation     Lack of Transportation (Medical): No     Lack of Transportation (Non-Medical): No   Physical Activity: Inactive (12/19/2023)    Exercise Vital Sign     Days of Exercise per Week: 0 days     Minutes of Exercise per Session: 0 min   Stress: No Stress Concern Present (12/19/2023)    [employer] [place] [employer] - Occupational Stress Questionnaire     Feeling of Stress : Not at all   Social Connections: Socially Isolated (12/19/2023)    Social Connection and Isolation Panel [NHANES]     Frequency of Communication with Friends and Family: More than three times a week     Frequency of Social Gatherings with Friends and Family: More than three times a week     Attends Sikhism Services: Never     Active Member of Clubs or Organizations: No     Attends Club or Organization Meetings: Never     Marital Status: Never    Intimate Partner Violence: Not At Risk (12/19/2023)    Humiliation, Afraid, Rape, and Kick questionnaire     Fear of Current or Ex-Partner: No     Emotionally Abused: No     Physically Abused: No     Sexually Abused: No   Housing Stability: Unknown (12/19/2023)    Housing Stability Vital Sign     Unable to Pay for Housing in the Last Year: No     Number of Places Lived in the Last Year: Not on file     Unstable Housing  "in the Last Year: No     Current Outpatient Medications on File Prior to Visit   Medication Sig    Multiple Vitamins-Minerals (Womens Multi) CAPS Take by mouth    [DISCONTINUED] Diclofenac Sodium (VOLTAREN) 1 % Apply 2 g topically 4 (four) times a day    [DISCONTINUED] polyethylene glycol (GLYCOLAX) 17 GM/SCOOP powder     [DISCONTINUED] polyethylene glycol (MIRALAX) 17 g packet Take 17 g by mouth every other day    [DISCONTINUED] triamcinolone (KENALOG) 0.025 % cream Apply topically 2 (two) times a day as needed for rash     No Known Allergies  Immunization History   Administered Date(s) Administered    COVID-19 PFIZER VACCINE 0.3 ML IM 09/26/2021, 10/17/2021    Influenza, injectable, quadrivalent, preservative free 0.5 mL 10/21/2020    Tdap 01/23/2023       Objective     /80 (BP Location: Left arm, Patient Position: Sitting, Cuff Size: Large)   Pulse 85   Temp 98 °F (36.7 °C) (Temporal)   Ht 5' 1\" (1.549 m)   Wt 70.3 kg (155 lb)   SpO2 100%   BMI 29.29 kg/m²     Physical Exam  Constitutional:       General: She is not in acute distress.     Appearance: She is not ill-appearing or toxic-appearing.   HENT:      Head: Normocephalic and atraumatic.      Right Ear: External ear normal.      Left Ear: External ear normal.   Eyes:      General: No scleral icterus.        Right eye: No discharge.         Left eye: No discharge.      Extraocular Movements: Extraocular movements intact.      Conjunctiva/sclera: Conjunctivae normal.   Cardiovascular:      Rate and Rhythm: Normal rate and regular rhythm.   Pulmonary:      Effort: Pulmonary effort is normal. No respiratory distress.      Breath sounds: Normal breath sounds.   Abdominal:      General: Bowel sounds are normal.      Palpations: Abdomen is soft.      Tenderness: There is no abdominal tenderness.   Musculoskeletal:      Comments: Limited ROM with left shoulder flexion, abduction 2/2 pain. Yergason's test positive. Neer's test and Mix test positive.  "   Skin:     General: Skin is warm and dry.   Neurological:      General: No focal deficit present.      Mental Status: She is alert.      Motor: No weakness.      Gait: Gait normal.   Psychiatric:         Mood and Affect: Mood normal.         Behavior: Behavior normal.         Thought Content: Thought content normal.         Judgment: Judgment normal.       Pita Mora DO

## 2023-12-19 NOTE — ASSESSMENT & PLAN NOTE
Right shoulder pain at anterior shoulder and 3-4 inches distal for 1 week. No inciting injury. Yergason's test positive. Pt has had similar symptoms in the past.     Likely biceps tendinitis.     Plan  - educated on this overuse injury.   - rest, ice, compression discussed.  - Tylenol max 3 g for pain  - F/u in 2 weeks for annual physical and left shoulder pain

## 2023-12-19 NOTE — ASSESSMENT & PLAN NOTE
Right knee pain .   Physical exam unremarkable except for anterio-lateral patellar tenderness. Xray shows OA.   S/p 3 visco injections and PT    Plan  - continue to follow with ortho.   - Encouraged and demonstrated home quadriceps exercises  - lidocaine gel  - tylenol 3g max. Avoid NSAID 2/2 hx of Bariatric surgery

## 2023-12-19 NOTE — ASSESSMENT & PLAN NOTE
/80.  Pt stopped HTN meds (lisinopril and HCTZ) around time of bariatric surgery.   BP at goal of <130/90 off of anti-HTN meds.     - continue life style modifications and off of anti HTN medications.   - f/u in 6 months.

## 2023-12-20 ENCOUNTER — TELEPHONE (OUTPATIENT)
Age: 48
End: 2023-12-20

## 2023-12-20 ENCOUNTER — PREP FOR PROCEDURE (OUTPATIENT)
Age: 48
End: 2023-12-20

## 2023-12-20 DIAGNOSIS — Z12.11 SCREENING FOR COLON CANCER: Primary | ICD-10-CM

## 2023-12-20 NOTE — TELEPHONE ENCOUNTER
Carline Perez from [facility] calling w/ pt on the line to schedule colonoscopy. Carline asked OA questions on referral # [id number]. Pt passed oa. I placed order for [place]. Pt wants this location but there is only availability in [facility] for room 3 on March 5. Pt hung up phone during the process so Carline will contact pt back and tell her to call to schedule colonoscopy. If pt wants sooner appt we can check [facility] but must ask [facility] screening questions.

## 2023-12-26 ENCOUNTER — APPOINTMENT (OUTPATIENT)
Dept: LAB | Facility: CLINIC | Age: 48
End: 2023-12-26
Payer: COMMERCIAL

## 2023-12-26 DIAGNOSIS — K91.2 POSTSURGICAL MALABSORPTION: ICD-10-CM

## 2023-12-26 DIAGNOSIS — E05.90 SUBCLINICAL HYPERTHYROIDISM: ICD-10-CM

## 2023-12-26 DIAGNOSIS — Z98.84 BARIATRIC SURGERY STATUS: ICD-10-CM

## 2023-12-26 DIAGNOSIS — Z48.815 ENCOUNTER FOR SURGICAL AFTERCARE FOLLOWING SURGERY OF DIGESTIVE SYSTEM: ICD-10-CM

## 2023-12-26 DIAGNOSIS — Z13.6 SCREENING FOR CARDIOVASCULAR CONDITION: ICD-10-CM

## 2023-12-26 DIAGNOSIS — E11.9 TYPE 2 DIABETES MELLITUS WITHOUT COMPLICATION, WITHOUT LONG-TERM CURRENT USE OF INSULIN (HCC): ICD-10-CM

## 2023-12-26 DIAGNOSIS — I10 ESSENTIAL HYPERTENSION: ICD-10-CM

## 2023-12-26 LAB
25(OH)D3 SERPL-MCNC: 53.2 NG/ML (ref 30–100)
ALBUMIN SERPL BCP-MCNC: 3.6 G/DL (ref 3.5–5)
ALP SERPL-CCNC: 78 U/L (ref 34–104)
ALT SERPL W P-5'-P-CCNC: 25 U/L (ref 7–52)
ANION GAP SERPL CALCULATED.3IONS-SCNC: 8 MMOL/L
AST SERPL W P-5'-P-CCNC: 21 U/L (ref 13–39)
BASOPHILS # BLD AUTO: 0.01 THOUSANDS/ÂΜL (ref 0–0.1)
BASOPHILS NFR BLD AUTO: 0 % (ref 0–1)
BILIRUB SERPL-MCNC: 0.58 MG/DL (ref 0.2–1)
BUN SERPL-MCNC: 9 MG/DL (ref 5–25)
CALCIUM SERPL-MCNC: 9.8 MG/DL (ref 8.4–10.2)
CHLORIDE SERPL-SCNC: 105 MMOL/L (ref 96–108)
CHOLEST SERPL-MCNC: 130 MG/DL
CO2 SERPL-SCNC: 30 MMOL/L (ref 21–32)
CREAT SERPL-MCNC: 0.23 MG/DL (ref 0.6–1.3)
CREAT UR-MCNC: 104.2 MG/DL
EOSINOPHIL # BLD AUTO: 0.09 THOUSAND/ÂΜL (ref 0–0.61)
EOSINOPHIL NFR BLD AUTO: 2 % (ref 0–6)
ERYTHROCYTE [DISTWIDTH] IN BLOOD BY AUTOMATED COUNT: 13.4 % (ref 11.6–15.1)
EST. AVERAGE GLUCOSE BLD GHB EST-MCNC: 105 MG/DL
FERRITIN SERPL-MCNC: 86 NG/ML (ref 11–307)
FOLATE SERPL-MCNC: >22.3 NG/ML
GFR SERPL CREATININE-BSD FRML MDRD: 146 ML/MIN/1.73SQ M
GLUCOSE P FAST SERPL-MCNC: 90 MG/DL (ref 65–99)
HBA1C MFR BLD: 5.3 %
HCT VFR BLD AUTO: 37.6 % (ref 34.8–46.1)
HDLC SERPL-MCNC: 46 MG/DL
HGB BLD-MCNC: 11.5 G/DL (ref 11.5–15.4)
IMM GRANULOCYTES # BLD AUTO: 0.01 THOUSAND/UL (ref 0–0.2)
IMM GRANULOCYTES NFR BLD AUTO: 0 % (ref 0–2)
IRON SATN MFR SERPL: 31 % (ref 15–50)
IRON SERPL-MCNC: 84 UG/DL (ref 50–212)
LDLC SERPL CALC-MCNC: 69 MG/DL (ref 0–100)
LYMPHOCYTES # BLD AUTO: 2.29 THOUSANDS/ÂΜL (ref 0.6–4.47)
LYMPHOCYTES NFR BLD AUTO: 42 % (ref 14–44)
MCH RBC QN AUTO: 27.4 PG (ref 26.8–34.3)
MCHC RBC AUTO-ENTMCNC: 30.6 G/DL (ref 31.4–37.4)
MCV RBC AUTO: 90 FL (ref 82–98)
MICROALBUMIN UR-MCNC: 17.8 MG/L
MICROALBUMIN/CREAT 24H UR: 17 MG/G CREATININE (ref 0–30)
MONOCYTES # BLD AUTO: 0.39 THOUSAND/ÂΜL (ref 0.17–1.22)
MONOCYTES NFR BLD AUTO: 7 % (ref 4–12)
NEUTROPHILS # BLD AUTO: 2.62 THOUSANDS/ÂΜL (ref 1.85–7.62)
NEUTS SEG NFR BLD AUTO: 49 % (ref 43–75)
NONHDLC SERPL-MCNC: 84 MG/DL
NRBC BLD AUTO-RTO: 0 /100 WBCS
PLATELET # BLD AUTO: 166 THOUSANDS/UL (ref 149–390)
PMV BLD AUTO: 13 FL (ref 8.9–12.7)
POTASSIUM SERPL-SCNC: 3.6 MMOL/L (ref 3.5–5.3)
PROT SERPL-MCNC: 7.1 G/DL (ref 6.4–8.4)
PTH-INTACT SERPL-MCNC: 29.4 PG/ML (ref 12–88)
RBC # BLD AUTO: 4.19 MILLION/UL (ref 3.81–5.12)
SODIUM SERPL-SCNC: 143 MMOL/L (ref 135–147)
T4 FREE SERPL-MCNC: 3.82 NG/DL (ref 0.61–1.12)
TIBC SERPL-MCNC: 273 UG/DL (ref 250–450)
TRIGL SERPL-MCNC: 75 MG/DL
TSH SERPL DL<=0.05 MIU/L-ACNC: <0.01 UIU/ML (ref 0.45–4.5)
UIBC SERPL-MCNC: 189 UG/DL (ref 155–355)
VIT B12 SERPL-MCNC: 647 PG/ML (ref 180–914)
WBC # BLD AUTO: 5.41 THOUSAND/UL (ref 4.31–10.16)

## 2023-12-26 PROCEDURE — 84425 ASSAY OF VITAMIN B-1: CPT

## 2023-12-26 PROCEDURE — 82728 ASSAY OF FERRITIN: CPT

## 2023-12-26 PROCEDURE — 82746 ASSAY OF FOLIC ACID SERUM: CPT

## 2023-12-26 PROCEDURE — 84443 ASSAY THYROID STIM HORMONE: CPT

## 2023-12-26 PROCEDURE — 84590 ASSAY OF VITAMIN A: CPT

## 2023-12-26 PROCEDURE — 84439 ASSAY OF FREE THYROXINE: CPT

## 2023-12-26 PROCEDURE — 83540 ASSAY OF IRON: CPT

## 2023-12-26 PROCEDURE — 80053 COMPREHEN METABOLIC PANEL: CPT

## 2023-12-26 PROCEDURE — 82043 UR ALBUMIN QUANTITATIVE: CPT

## 2023-12-26 PROCEDURE — 82306 VITAMIN D 25 HYDROXY: CPT

## 2023-12-26 PROCEDURE — 80061 LIPID PANEL: CPT

## 2023-12-26 PROCEDURE — 83036 HEMOGLOBIN GLYCOSYLATED A1C: CPT

## 2023-12-26 PROCEDURE — 83550 IRON BINDING TEST: CPT

## 2023-12-26 PROCEDURE — 84630 ASSAY OF ZINC: CPT

## 2023-12-26 PROCEDURE — 82570 ASSAY OF URINE CREATININE: CPT

## 2023-12-26 PROCEDURE — 36415 COLL VENOUS BLD VENIPUNCTURE: CPT

## 2023-12-26 PROCEDURE — 85025 COMPLETE CBC W/AUTO DIFF WBC: CPT

## 2023-12-26 PROCEDURE — 82607 VITAMIN B-12: CPT

## 2023-12-26 PROCEDURE — 83970 ASSAY OF PARATHORMONE: CPT

## 2023-12-28 ENCOUNTER — RA CDI HCC (OUTPATIENT)
Dept: OTHER | Facility: HOSPITAL | Age: 48
End: 2023-12-28

## 2023-12-28 LAB — VIT B1 BLD-SCNC: 134.9 NMOL/L (ref 66.5–200)

## 2023-12-29 LAB — VIT A SERPL-MCNC: 21.9 UG/DL (ref 20.1–62)

## 2024-01-02 ENCOUNTER — OFFICE VISIT (OUTPATIENT)
Dept: FAMILY MEDICINE CLINIC | Facility: CLINIC | Age: 49
End: 2024-01-02

## 2024-01-02 VITALS
RESPIRATION RATE: 18 BRPM | HEART RATE: 90 BPM | HEIGHT: 61 IN | TEMPERATURE: 98.9 F | DIASTOLIC BLOOD PRESSURE: 81 MMHG | WEIGHT: 156 LBS | SYSTOLIC BLOOD PRESSURE: 128 MMHG | OXYGEN SATURATION: 98 % | BODY MASS INDEX: 29.45 KG/M2

## 2024-01-02 DIAGNOSIS — Z00.00 ANNUAL PHYSICAL EXAM: Primary | ICD-10-CM

## 2024-01-02 DIAGNOSIS — G89.29 CHRONIC LEFT SHOULDER PAIN: ICD-10-CM

## 2024-01-02 DIAGNOSIS — Z23 ENCOUNTER FOR IMMUNIZATION: ICD-10-CM

## 2024-01-02 DIAGNOSIS — E05.90 SUBCLINICAL HYPERTHYROIDISM: ICD-10-CM

## 2024-01-02 DIAGNOSIS — Z12.31 SCREENING MAMMOGRAM FOR BREAST CANCER: ICD-10-CM

## 2024-01-02 DIAGNOSIS — M25.512 CHRONIC LEFT SHOULDER PAIN: ICD-10-CM

## 2024-01-02 LAB — ZINC SERPL-MCNC: 81 UG/DL (ref 44–115)

## 2024-01-02 PROCEDURE — 99396 PREV VISIT EST AGE 40-64: CPT | Performed by: FAMILY MEDICINE

## 2024-01-02 PROCEDURE — 90471 IMMUNIZATION ADMIN: CPT | Performed by: FAMILY MEDICINE

## 2024-01-02 PROCEDURE — 90750 HZV VACC RECOMBINANT IM: CPT | Performed by: FAMILY MEDICINE

## 2024-01-02 NOTE — PROGRESS NOTES
ADULT ANNUAL PHYSICAL  [facility] JAYASHREE    NAME: Pamella Rm  AGE: 48 y.o. SEX: female  : 1975     DATE: 2024     Assessment and Plan:     Problem List Items Addressed This Visit       Subclinical hyperthyroidism     Thyroid    Lab Results   Component Value Date    CGR6VXIVZCDV <0.010 (L) 2023    WPJ5KIBDOTNZ 0.033 (L) 2022    OUD7RHPHPEMF 0.165 (L) 2022    HAA0HNJJRGZE 1.290 2019    FREET4 3.82 (H) 2023    FREET4 1.22 2022    FREET4 1.10 2022    Asymptomatic.     Plan  - thyroid uptake scan         Relevant Orders    NM thyroid imaging w uptake(s)    Annual physical exam - Primary     - Screening for cardiovascular disease: reviewed yearly BMP and Lipid panel ordered today, unremarkable  - ASCVD score: 1.7%  - Colonoscopy: referred  - PAP/HPV: due   - Mammogram: ordered  - Smoking cessation: Never  - HIV and Hep C screenings: wnl  - Vaccinations: Flu declines, [id number], shingrex today  - Depression screening: PHQ score 0 (24), neg  - Counseled the patient on healthy lifestyle habits including diet, and exercise.            Other Visit Diagnoses       Encounter for immunization        Relevant Orders    Shingrix 50 mcg/0.5M mL IM given in OFFICE (Completed)    Chronic left shoulder pain        Relevant Orders    XR shoulder 2+ vw left    Screening mammogram for breast cancer        Relevant Orders    Mammo screening bilateral w 3d & cad            Immunizations and preventive care screenings were discussed with patient today. Appropriate education was printed on patient's after visit summary.    Counseling:  Alcohol/drug use: discussed moderation in alcohol intake, the recommendations for healthy alcohol use, and avoidance of illicit drug use.  Dental Health: discussed importance of regular tooth brushing, flossing, and dental visits.  Injury prevention: discussed safety/seat belts,  safety helmets, smoke detectors, carbon dioxide detectors, and smoking near bedding or upholstery.  Sexual health: discussed sexually transmitted diseases, partner selection, use of condoms, avoidance of unintended pregnancy, and contraceptive alternatives.  Exercise: the importance of regular exercise/physical activity was discussed. Recommend exercise 3-5 times per week for at least 30 minutes.          Return in about 6 months (around 7/2/2024) for Next scheduled follow up.     Chief Complaint:     Chief Complaint   Patient presents with    Physical Exam     No questions or concerns      History of Present Illness:     Adult Annual Physical   Patient here for a comprehensive physical exam. The patient reports no problems.  Right knee pain   Left arm.   Abduction, IR huts.   Massage.     Diet and Physical Activity  Diet/Nutrition: well balanced diet, low calorie. Hx of bariatric surgery  Exercise: no formal exercise 2/2 knee OA. Pt tries to go up stairs frequently.      Depression Screening  PHQ-2/9 Depression Screening    Little interest or pleasure in doing things: 0 - not at all  Feeling down, depressed, or hopeless: 0 - not at all  PHQ-2 Score: 0  PHQ-2 Interpretation: Negative depression screen       General Health  Sleep: sleeps well.   Hearing: normal - bilateral.  Vision: most recent eye exam <1 year ago and wears glasses, no contacts.   Dental: no dental visits for >1 year and brushes teeth once daily.       /GYN Health  Follows with gynecology? no   Patient is: perimenopausal  Last menstrual period: 12/2023.   Contraceptive method: not sexually active       Review of Systems:     Review of Systems   Constitutional:  Negative for activity change, fatigue, fever and unexpected weight change.   HENT:  Negative for sore throat.    Eyes:  Negative for redness and visual disturbance.   Cardiovascular:  Negative for chest pain and palpitations.   Gastrointestinal:  Negative for abdominal pain, constipation,  diarrhea, nausea and vomiting.   Genitourinary:  Negative for dysuria, hematuria and menstrual problem.   Skin:  Negative for rash.   Neurological:  Negative for dizziness, syncope and headaches.      Past Medical History:     Past Medical History:   Diagnosis Date    CPAP (continuous positive airway pressure) dependence     Diabetes mellitus (HCC)     Type 2    GERD (gastroesophageal reflux disease)     Heart murmur     Hypertension     Seasonal allergies     Sleep apnea       Past Surgical History:     Past Surgical History:   Procedure Laterality Date     SECTION  10/04/2005    FL LAPS GSTR RSTCV PX W/BYP GRUPO-EN-Y LIMB <150 CM N/A 2022    Procedure: BYPASS GASTRIC  R-N-Y  INTRAOPERATIVE EGD;;  Surgeon: Harrison Roberts MD;  Location: [facility];  Service: Bariatrics    TUBAL LIGATION        Social History:     Social History     Socioeconomic History    Marital status: Single     Spouse name: Not on file    Number of children: 3    Years of education: Not on file    Highest education level: Not on file   Occupational History    Occupation: Behavioral health field   Tobacco Use    Smoking status: Never    Smokeless tobacco: Never   Vaping Use    Vaping status: Never Used   Substance and Sexual Activity    Alcohol use: Never    Drug use: Never    Sexual activity: Not Currently     Birth control/protection: Female Sterilization   Other Topics Concern    Not on file   Social History Narrative    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     Social Determinants of Health     Financial Resource Strain: Low Risk  (2023)    Overall Financial Resource Strain (CARDIA)     Difficulty of Paying Living Expenses: Not hard at all   Food Insecurity: No Food Insecurity (2023)    Hunger Vital Sign     Worried About Running Out of Food in the Last Year: Never true     Ran Out of Food in the Last Year: Never true   Transportation Needs: No Transportation Needs (2023)    PRAPARE -  Transportation     Lack of Transportation (Medical): No     Lack of Transportation (Non-Medical): No   Physical Activity: Inactive (12/19/2023)    Exercise Vital Sign     Days of Exercise per Week: 0 days     Minutes of Exercise per Session: 0 min   Stress: No Stress Concern Present (12/19/2023)    [employer] - Occupational Stress Questionnaire     Feeling of Stress : Not at all   Social Connections: Socially Isolated (12/19/2023)    Social Connection and Isolation Panel [NHANES]     Frequency of Communication with Friends and Family: More than three times a week     Frequency of Social Gatherings with Friends and Family: More than three times a week     Attends Jain Services: Never     Active Member of Clubs or Organizations: No     Attends Club or Organization Meetings: Never     Marital Status: Never    Intimate Partner Violence: Not At Risk (12/19/2023)    Humiliation, Afraid, Rape, and Kick questionnaire     Fear of Current or Ex-Partner: No     Emotionally Abused: No     Physically Abused: No     Sexually Abused: No   Housing Stability: Unknown (12/19/2023)    Housing Stability Vital Sign     Unable to Pay for Housing in the Last Year: No     Number of Places Lived in the Last Year: Not on file     Unstable Housing in the Last Year: No      Family History:     Family History   Problem Relation Age of Onset    Hypertension Mother     Diabetes Mother     Hypertension Father     Diabetes Father     Cancer Maternal Grandmother     Diabetes Maternal Grandfather       Current Medications:     Current Outpatient Medications   Medication Sig Dispense Refill    Multiple Vitamins-Minerals (Womens Multi) CAPS Take by mouth       No current facility-administered medications for this visit.      Allergies:     No Known Allergies   Physical Exam:     /81 (BP Location: Right arm, Patient Position: Sitting, Cuff Size: Large)   Pulse 90   Temp 98.9 °F (37.2 °C) (Temporal)    "Resp 18   Ht 5' 1\" (1.549 m)   Wt 70.8 kg (156 lb)   SpO2 98%   BMI 29.48 kg/m²     Physical Exam  Vitals and nursing note reviewed.   Constitutional:       General: She is not in acute distress.     Appearance: Normal appearance. She is well-developed. She is obese. She is not toxic-appearing.   HENT:      Head: Normocephalic and atraumatic.      Right Ear: Tympanic membrane, ear canal and external ear normal.      Left Ear: Tympanic membrane, ear canal and external ear normal.      Nose: Nose normal.      Mouth/Throat:      Mouth: Mucous membranes are moist.      Pharynx: Oropharynx is clear.   Eyes:      Extraocular Movements: Extraocular movements intact.      Conjunctiva/sclera: Conjunctivae normal.      Pupils: Pupils are equal, round, and reactive to light.   Cardiovascular:      Rate and Rhythm: Normal rate and regular rhythm.      Heart sounds: Normal heart sounds. No murmur heard.     No friction rub. No gallop.   Pulmonary:      Effort: Pulmonary effort is normal. No respiratory distress.      Breath sounds: Normal breath sounds. No wheezing, rhonchi or rales.   Abdominal:      General: Bowel sounds are normal.      Palpations: Abdomen is soft.      Tenderness: There is no abdominal tenderness.   Musculoskeletal:         General: Normal range of motion.      Cervical back: Neck supple.      Right lower leg: No edema.      Left lower leg: No edema.   Lymphadenopathy:      Cervical: No cervical adenopathy.   Skin:     General: Skin is warm and dry.   Neurological:      General: No focal deficit present.      Mental Status: She is alert.      Gait: Gait normal.   Psychiatric:         Behavior: Behavior normal.         Thought Content: Thought content normal.         Judgment: Judgment normal.          Pita Mora [facility]    "

## 2024-01-03 NOTE — ASSESSMENT & PLAN NOTE
Thyroid    Lab Results   Component Value Date    FTI3OBZWLKPD <0.010 (L) 12/26/2023    VOR7RPLLOSSW 0.033 (L) 07/02/2022    FRW7UYTZDHKF 0.165 (L) 05/17/2022    JSF4IRWIKCMG 1.290 02/18/2019    FREET4 3.82 (H) 12/26/2023    FREET4 1.22 07/02/2022    FREET4 1.10 05/17/2022    Asymptomatic.     Plan  - thyroid uptake scan

## 2024-01-03 NOTE — ASSESSMENT & PLAN NOTE
- Screening for cardiovascular disease: reviewed yearly BMP and Lipid panel ordered today, unremarkable  - ASCVD score: 1.7%  - Colonoscopy: referred  - PAP/HPV: due 2025  - Mammogram: ordered  - Smoking cessation: Never  - HIV and Hep C screenings: wnl  - Vaccinations: Flu declines, [id number], shingrex today  - Depression screening: PHQ score 0 (01/02/24), neg  - Counseled the patient on healthy lifestyle habits including diet, and exercise.

## 2024-01-04 ENCOUNTER — TELEPHONE (OUTPATIENT)
Age: 49
End: 2024-01-04

## 2024-01-04 NOTE — TELEPHONE ENCOUNTER
01/04/24  Screened by: Tammi Louis    Referring Provider - Abby    29.48    Has patient been referred for a routine screening Colonoscopy? yes  Is the patient between 45-75 years old? yes      Previous Colonoscopy no    SCHEDULING STAFF: If the patient is between 45yrs-49yrs, please advise patient to confirm benefits/coverage with their insurance company for a routine screening colonoscopy, some insurance carriers will only cover at 50yrs or older. If the patient is over 75years old, please schedule an office visit.     Does the patient want to see a Gastroenterologist prior to their procedure OR are they having any GI symptoms? no    Has the patient been hospitalized or had abdominal surgery in the past 6 months? no    Does the patient use supplemental oxygen? no    Does the patient take Coumadin, Lovenox, Plavix, Elliquis, Xarelto, or other blood thinning medication? no    Has the patient had a stroke, cardiac event, or stent placed in the past year? no    SCHEDULING STAFF: If patient answers NO to above questions, then schedule procedure. If patient answers YES to above questions, then schedule office appointment.     If patient is between 45yrs - 49yrs, please advise patient that we will have to confirm benefits & coverage with their insurance company for a routine screening colonoscopy.

## 2024-01-04 NOTE — TELEPHONE ENCOUNTER
ASC Screening    ASC Screening  BMI > than 45: No  Are you currently pregnant?: No  Do you rely on a wheelchair for mobility?: No  Do you need oxygen during the day?: No  Have you ever been informed by anesthesia that you have a difficult airway?: No  Have you been diagnosed with End Stage Renal Disease (ESRD)?: No  Are you actively on dialysis?: No  Have you been diagnosed with Pulmonary Hypertension?: No  Do you have a pacemaker or an Automatic Implantable Cardioverter Defibrillator (AICD)?: No  Have you ever had an organ transplant?: No  Have you had a stroke, heart attack, myocardial infarction (MI) within the last 6 months?: No  Have you ever been diagnosed with Aortic Stenosis?: No  Have you ever been diagnosed  with Congestive Heart Failure?: No  Have you ever been diagnosed with a heart valve disease?: No  Are you Diabetic?: No  If you are Diabetic, has your A1C been greater than 12 within the last six months?: N/A

## 2024-01-04 NOTE — TELEPHONE ENCOUNTER
Scheduled date of colonoscopy (as of today): 03/11/2024    Physician performing colonoscopy: Tay    [facility]    Bowel prep reviewed with patient: Miralax    Instructions reviewed with patient by:  RONAL Aranda    Clearances: None

## 2024-02-21 PROBLEM — J06.9 VIRAL URI WITH COUGH: Status: RESOLVED | Noted: 2019-06-10 | Resolved: 2024-02-21

## 2024-02-26 ENCOUNTER — ANESTHESIA EVENT (OUTPATIENT)
Dept: ANESTHESIOLOGY | Facility: HOSPITAL | Age: 49
End: 2024-02-26

## 2024-02-26 ENCOUNTER — ANESTHESIA (OUTPATIENT)
Dept: ANESTHESIOLOGY | Facility: HOSPITAL | Age: 49
End: 2024-02-26

## 2024-02-29 ENCOUNTER — OFFICE VISIT (OUTPATIENT)
Dept: OBGYN CLINIC | Facility: HOSPITAL | Age: 49
End: 2024-02-29
Payer: COMMERCIAL

## 2024-02-29 VITALS
SYSTOLIC BLOOD PRESSURE: 141 MMHG | WEIGHT: 152.2 LBS | HEART RATE: 85 BPM | DIASTOLIC BLOOD PRESSURE: 85 MMHG | HEIGHT: 61 IN | BODY MASS INDEX: 28.74 KG/M2

## 2024-02-29 DIAGNOSIS — M17.11 PRIMARY OSTEOARTHRITIS OF RIGHT KNEE: Primary | ICD-10-CM

## 2024-02-29 DIAGNOSIS — G89.29 CHRONIC PAIN OF RIGHT KNEE: ICD-10-CM

## 2024-02-29 DIAGNOSIS — M25.561 CHRONIC PAIN OF RIGHT KNEE: ICD-10-CM

## 2024-02-29 PROCEDURE — 99212 OFFICE O/P EST SF 10 MIN: CPT

## 2024-02-29 NOTE — PROGRESS NOTES
Assessment:   Diagnosis ICD-10-CM Associated Orders   1. Primary osteoarthritis of right knee  M17.11       2. Chronic pain of right knee  M25.561     G89.29           Plan:  The patient does not feel that she needs further Visco supplementation at this time.  She will monitor her symptoms and let us know when she feels ready for more injections.  We discussed that her right leg is weaker than the left.  She should continue to do the home exercises that were given to her by PT.  She should also participate in exercises such as walking, cycling, and swimming if she has access to a pool.    To do next visit:  Follow-up PRN right knee pain for Visco supplementation.    The above stated was discussed in layman's terms and the patient expressed understanding.  All questions were answered to the patient's satisfaction.         Subjective:   Pamella Rm is a 48 y.o. female who presents to the office today for a follow-up appointment for her right knee pain.  Approximately 6 months ago, she underwent a series of Visco supplementation for the right knee.  She says this injection provided some relief, but a lot of her symptoms were relieved by the therapy she did.  She is now able to walk up stairs more easily.  She does note ongoing weakness in the right leg.  Her symptoms mostly began when she lost about 150 pounds and became more active.  There was no specific inciting injury.      Review of systems negative unless otherwise specified in HPI    Past Medical History:   Diagnosis Date    CPAP (continuous positive airway pressure) dependence     Diabetes mellitus (HCC)     Type 2    GERD (gastroesophageal reflux disease)     Heart murmur     Hypertension     Seasonal allergies     Sleep apnea        Past Surgical History:   Procedure Laterality Date     SECTION  10/04/2005    FL LAPS GSTR RSTCV PX W/BYP GRUPO-EN-Y LIMB <150 CM N/A 2022    Procedure: BYPASS GASTRIC  R-N-Y  INTRAOPERATIVE EGD;;  Surgeon: Harrison  "MD Alejandra;  Location: [facility] OR;  Service: Bariatrics    TUBAL LIGATION  2005       Family History   Problem Relation Age of Onset    Hypertension Mother     Diabetes Mother     Hypertension Father     Diabetes Father     Cancer Maternal Grandmother     Diabetes Maternal Grandfather        Social History     Occupational History    Occupation: Behavioral health field   Tobacco Use    Smoking status: Never    Smokeless tobacco: Never   Vaping Use    Vaping status: Never Used   Substance and Sexual Activity    Alcohol use: Never    Drug use: Never    Sexual activity: Not Currently     Birth control/protection: Female Sterilization         Current Outpatient Medications:     Multiple Vitamins-Minerals (Womens Multi) CAPS, Take by mouth, Disp: , Rfl:     No Known Allergies         Vitals:    02/29/24 1109   BP: 141/85   Pulse: 85       Objective:    General:  Patient is WDWN, alert and oriented, appears stated age, and is in no acute distress.    Musculoskeletal:    Right Knee:    Inspection:  No visible abnormality.     Range of Motion:  She is able to flex the knee to sit comfortably and extend it to stand.    Palpation:  She is not significantly tender with palpation of the knee.    Strength:  4/5 strength right hip flexion and knee extension.  5/5 strength on the contralateral side.        Diagnostics, reviewed and taken today if performed as documented:    None performed        Procedures, if performed today:    None performed      Portions of the record may have been created with voice recognition software.  Occasional wrong word or \"sound a like\" substitutions may have occurred due to the inherent limitations of voice recognition software.  Read the chart carefully and recognize, using context, where substitutions have occurred.  "

## 2024-03-05 ENCOUNTER — CLINICAL SUPPORT (OUTPATIENT)
Dept: FAMILY MEDICINE CLINIC | Facility: CLINIC | Age: 49
End: 2024-03-05

## 2024-03-05 DIAGNOSIS — Z23 ENCOUNTER FOR IMMUNIZATION: Primary | ICD-10-CM

## 2024-03-05 PROCEDURE — 90471 IMMUNIZATION ADMIN: CPT

## 2024-03-05 PROCEDURE — 90750 HZV VACC RECOMBINANT IM: CPT

## 2024-03-11 ENCOUNTER — ANESTHESIA EVENT (OUTPATIENT)
Dept: GASTROENTEROLOGY | Facility: AMBULARY SURGERY CENTER | Age: 49
End: 2024-03-11

## 2024-03-11 ENCOUNTER — HOSPITAL ENCOUNTER (OUTPATIENT)
Dept: GASTROENTEROLOGY | Facility: AMBULARY SURGERY CENTER | Age: 49
Setting detail: OUTPATIENT SURGERY
Discharge: HOME | End: 2024-03-11
Attending: INTERNAL MEDICINE
Payer: COMMERCIAL

## 2024-03-11 ENCOUNTER — ANESTHESIA (OUTPATIENT)
Dept: GASTROENTEROLOGY | Facility: AMBULARY SURGERY CENTER | Age: 49
End: 2024-03-11

## 2024-03-11 VITALS
HEART RATE: 79 BPM | BODY MASS INDEX: 27.42 KG/M2 | RESPIRATION RATE: 16 BRPM | HEIGHT: 62 IN | WEIGHT: 149 LBS | TEMPERATURE: 97.3 F | DIASTOLIC BLOOD PRESSURE: 74 MMHG | SYSTOLIC BLOOD PRESSURE: 157 MMHG | OXYGEN SATURATION: 100 %

## 2024-03-11 DIAGNOSIS — Z12.11 SCREENING FOR COLON CANCER: ICD-10-CM

## 2024-03-11 PROCEDURE — 88305 TISSUE EXAM BY PATHOLOGIST: CPT | Performed by: STUDENT IN AN ORGANIZED HEALTH CARE EDUCATION/TRAINING PROGRAM

## 2024-03-11 PROCEDURE — 45385 COLONOSCOPY W/LESION REMOVAL: CPT | Performed by: INTERNAL MEDICINE

## 2024-03-11 RX ORDER — SODIUM CHLORIDE, SODIUM LACTATE, POTASSIUM CHLORIDE, CALCIUM CHLORIDE 600; 310; 30; 20 MG/100ML; MG/100ML; MG/100ML; MG/100ML
INJECTION, SOLUTION INTRAVENOUS CONTINUOUS PRN
Status: DISCONTINUED | OUTPATIENT
Start: 2024-03-11 | End: 2024-03-11

## 2024-03-11 RX ORDER — PROPOFOL 10 MG/ML
INJECTION, EMULSION INTRAVENOUS AS NEEDED
Status: DISCONTINUED | OUTPATIENT
Start: 2024-03-11 | End: 2024-03-11

## 2024-03-11 RX ADMIN — PROPOFOL 50 MG: 10 INJECTION, EMULSION INTRAVENOUS at 09:01

## 2024-03-11 RX ADMIN — PROPOFOL 50 MG: 10 INJECTION, EMULSION INTRAVENOUS at 08:59

## 2024-03-11 RX ADMIN — PROPOFOL 50 MG: 10 INJECTION, EMULSION INTRAVENOUS at 09:03

## 2024-03-11 RX ADMIN — PROPOFOL 100 MG: 10 INJECTION, EMULSION INTRAVENOUS at 08:56

## 2024-03-11 RX ADMIN — SODIUM CHLORIDE, SODIUM LACTATE, POTASSIUM CHLORIDE, AND CALCIUM CHLORIDE: .6; .31; .03; .02 INJECTION, SOLUTION INTRAVENOUS at 07:59

## 2024-03-11 RX ADMIN — PROPOFOL 50 MG: 10 INJECTION, EMULSION INTRAVENOUS at 09:06

## 2024-03-11 RX ADMIN — PROPOFOL 50 MG: 10 INJECTION, EMULSION INTRAVENOUS at 09:09

## 2024-03-11 NOTE — ANESTHESIA POSTPROCEDURE EVALUATION
Post-Op Assessment Note    CV Status:  Stable  Pain Score: 0    Pain management: adequate       Mental Status:  Sleepy and arousable   PONV Controlled:  None   Airway Patency:  Patent     Post Op Vitals Reviewed: Yes    No anethesia notable event occurred.    Staff: CRNA               BP   159/80   Temp   97.3   Pulse  82   Resp 14   SpO2 100

## 2024-03-11 NOTE — H&P
"History and Physical - [facility]  Pamella Rm 48 y.o. female MRN: [id number]                  HPI: Pamella Rm is a 48 y.o. year old female who presents for colonoscopy      REVIEW OF SYSTEMS: Per the HPI, and otherwise unremarkable.    Historical Information   Past Medical History:   Diagnosis Date    CPAP (continuous positive airway pressure) dependence     Diabetes mellitus (HCC)     Type 2    GERD (gastroesophageal reflux disease)     Heart murmur     Hypertension     Seasonal allergies     Sleep apnea      Past Surgical History:   Procedure Laterality Date     SECTION  10/04/2005    MD LAPS GSTR RSTCV PX W/BYP GRUPO-EN-Y LIMB <150 CM N/A 2022    Procedure: BYPASS GASTRIC  R-N-Y  INTRAOPERATIVE EGD;;  Surgeon: Harrison Roberts MD;  Location: [facility];  Service: Bariatrics    TUBAL LIGATION       Social History   Social History     Substance and Sexual Activity   Alcohol Use Never     Social History     Substance and Sexual Activity   Drug Use Never     Social History     Tobacco Use   Smoking Status Never   Smokeless Tobacco Never     Family History   Problem Relation Age of Onset    Hypertension Mother     Diabetes Mother     Hypertension Father     Diabetes Father     Cancer Maternal Grandmother     Diabetes Maternal Grandfather        Meds/Allergies       Current Outpatient Medications:     Multiple Vitamins-Minerals (Womens Multi) CAPS  No current facility-administered medications for this encounter.    No Known Allergies    Objective     /84   Pulse 97   Temp (!) 97.3 °F (36.3 °C) (Temporal)   Resp 16   Ht 5' 2\" (1.575 m)   Wt 67.6 kg (149 lb)   LMP  (LMP Unknown) Comment: Tubal ligation irregular periods  SpO2 99%   BMI 27.25 kg/m²       PHYSICAL EXAM    Gen: NAD  Head: NCAT  CV: RRR  CHEST: Clear  ABD: soft, NT/ND  EXT: no edema      ASSESSMENT/PLAN:  This is a 48 y.o. year old female here for colonoscopy, and she is stable and optimized for " her procedure.

## 2024-03-11 NOTE — ANESTHESIA PREPROCEDURE EVALUATION
Procedure:  COLONOSCOPY    Relevant Problems   CARDIO   (+) Chest pain   (+) Essential hypertension   (+) Heart murmur      ENDO   (+) Subclinical hyperthyroidism   (+) Type 2 diabetes mellitus without complication, without long-term current use of insulin (HCC)      GI/HEPATIC   (+) Chronic GERD      MUSCULOSKELETAL   (+) Primary osteoarthritis of right knee      Past Medical History:   Diagnosis Date    CPAP (continuous positive airway pressure) dependence     Diabetes mellitus (HCC)     Type 2    GERD (gastroesophageal reflux disease)     Heart murmur     Hypertension     Seasonal allergies     Sleep apnea      Lab Results   Component Value Date    WBC 5.41 12/26/2023    HGB 11.5 12/26/2023    HCT 37.6 12/26/2023    MCV 90 12/26/2023     12/26/2023     Lab Results   Component Value Date    HGBA1C 5.3 12/26/2023         Physical Exam    Airway    Mallampati score: I  TM Distance: >3 FB  Neck ROM: full     Dental   No notable dental hx     Cardiovascular  Rhythm: regular, Rate: normal    Pulmonary   Breath sounds clear to auscultation    Other Findings  Intercisor Distance > 3cm    post-pubertal.      Anesthesia Plan  ASA Score- 2     Anesthesia Type- IV sedation with anesthesia with ASA Monitors.         Additional Monitors:     Airway Plan:     Comment: NPO appropriate. Discussed benefits/risks of monitored anesthetic care which involves providing a dynamic level of mild to deep sedation. Complications include awareness and/or airway obstruction/aspiration which may necessitate conversion to general anesthesia. All questions answered. Patient understands and wishes to proceed. .       Plan Factors-Exercise tolerance (METS): >4 METS.    Chart reviewed. EKG reviewed.  Existing labs reviewed.                   Induction-     Postoperative Plan- Plan for postoperative opioid use.     Informed Consent- Anesthetic plan and risks discussed with patient.  I personally reviewed this patient with the CRNA. Discussed  and agreed on the Anesthesia Plan with the CRNA..

## 2024-03-12 ENCOUNTER — TELEPHONE (OUTPATIENT)
Dept: GASTROENTEROLOGY | Facility: CLINIC | Age: 49
End: 2024-03-12

## 2024-03-12 NOTE — TELEPHONE ENCOUNTER
----- Message from Leatha Cross MD sent at 3/12/2024  9:54 AM EDT -----  All biopsies obtained were normal.

## 2024-03-12 NOTE — TELEPHONE ENCOUNTER
I left a voice message to inform patient of results and provided the office number with any questions or concerns, A recall to repeat colonoscopy in 10 years was entered, thank you

## 2024-07-24 ENCOUNTER — OFFICE VISIT (OUTPATIENT)
Dept: BARIATRICS | Facility: CLINIC | Age: 49
End: 2024-07-24
Payer: COMMERCIAL

## 2024-07-24 VITALS
SYSTOLIC BLOOD PRESSURE: 118 MMHG | TEMPERATURE: 98.3 F | DIASTOLIC BLOOD PRESSURE: 68 MMHG | HEART RATE: 95 BPM | WEIGHT: 142 LBS | BODY MASS INDEX: 26.13 KG/M2 | HEIGHT: 62 IN

## 2024-07-24 DIAGNOSIS — Z48.815 ENCOUNTER FOR SURGICAL AFTERCARE FOLLOWING SURGERY OF DIGESTIVE SYSTEM: Primary | ICD-10-CM

## 2024-07-24 DIAGNOSIS — L98.7 EXCESS SKIN: ICD-10-CM

## 2024-07-24 DIAGNOSIS — K91.2 POSTSURGICAL MALABSORPTION: ICD-10-CM

## 2024-07-24 DIAGNOSIS — Z98.84 BARIATRIC SURGERY STATUS: ICD-10-CM

## 2024-07-24 PROCEDURE — 99213 OFFICE O/P EST LOW 20 MIN: CPT | Performed by: NURSE PRACTITIONER

## 2024-07-24 RX ORDER — NYSTATIN 100000 [USP'U]/G
POWDER TOPICAL 4 TIMES DAILY
Qty: 30 G | Refills: 3 | Status: SHIPPED | OUTPATIENT
Start: 2024-07-24

## 2024-07-24 NOTE — PROGRESS NOTES
Assessment/Plan:     Patient ID: Pamella Rm is a 49 y.o. female.     Bariatric Surgery Status/BMI 26    -s/p Merry-En-Y Gastric Bypass with Dr. Roberts on 11/28/2022. Presents to the office today for 18 month post op visit. Overall doing well, tolerating a regular diet. Denies having any abdominal pain, N/V/D/C, regurgitation, reflux or dysphagia. Taking her MVI daily. Lost an additional 14 lbs since last office visit.she is happy with her progress and noticed her weight has been stable since last seen. Still having knee pain - has been inhibiting her from exercise. Unable to get a knee replacement due to her age.      PLAN:     - Routine follow up in 6 months for annual visit.   - Continue with healthy lifestyle, adequate protein intake of 60 gm, fluid intake of at least 64 oz.   - Continue with MVI daily.   - Activity as tolerated.   - Labs ordered and will adjust accordingly if any deficiency.   - Follow up with RD and SW as needed.       Excess skin - of abdominal region, bilateral arms and and inner thighs. Since bariatric surgery, has lost  157 lbs. Believes excess skin causes her legs and arms to feel heavier hence worsening knee and shoulder pain. Has associated rash in umbilical region which she is trying to keep area clean and dry as much as possible.   - supportive care. Can try nystatin powder.   - compression leggings recommended.   - Plan to refer to plastics at next visit.       Continued/Maintain healthy weight loss with good nutrition intakes.  Adequate hydration with at least 64oz. fluid intake.  Follow diet as discussed.  Follow vitamin and mineral recommendations as reviewed with you.  Exercise as tolerated.    Colonoscopy referral made: UTD  Mammogram - UTD    Follow-up in 6 months for annual visit. We kindly ask that your arrive 15 minutes before your scheduled appointment time with your provider to allow our staff to room you, get your vital signs and update your chart.    Get lab work done  prior to annual visit. Please call the office if you need a script.  It is recommended to check with your insurance BEFORE getting labs done to make sure they are covered by your policy.      Call our office if you have any problems with abdominal pain especially associated with fever, chills, nausea, vomiting or any other concerns.    All  Post-bariatric surgery patients should be aware that very small quantities of any alcohol can cause impairment and it is very possible not to feel the effect. The effect can be in the system for several hours.  It is also a stomach irritant.     It is advised to AVOID alcohol, Nonsteroidal antiinflammatory drugs (NSAIDS) and nicotine of all forms . Any of these can cause stomach irritation/pain.    Discussed the effects of alcohol on a bariatric patient and the increased impairment risk.     Keep up the good work!     Postsurgical Malabsorption   -At risk for malabsorption of vitamins/minerals secondary to malabsorption and restriction of intake from bariatric surgery  - Currently taking adequate postop bariatric surgery vitamin supplementation  -Last set of bariatric labs completed on 12/2023 and showed WNL   -Patient received education about the importance of adhering to a lifelong supplementation regimen to avoid vitamin/mineral deficiencies      Diagnoses and all orders for this visit:    Encounter for surgical aftercare following surgery of digestive system    Bariatric surgery status  -     Ambulatory referral to Plastic Surgery; Future  -     nystatin (MYCOSTATIN) powder; Apply topically 4 (four) times a day    Postsurgical malabsorption    BMI 26.0-26.9,adult    Excess skin  -     Ambulatory referral to Plastic Surgery; Future  -     nystatin (MYCOSTATIN) powder; Apply topically 4 (four) times a day         Subjective:      Patient ID: Pamella Rm is a 49 y.o. female.    -s/p Merry-En-Y Gastric Bypass with Dr. Roberts on 11/28/2022. Presents to the office today for 18  "month post op visit. Overall doing well, tolerating a regular diet. Denies having any abdominal pain, N/V/D/C, regurgitation, reflux or dysphagia. Taking her MVI daily. Lost an additional 14 lbs since last office visit.she is happy with her progress and noticed her weight has been stable since last seen. Still having knee pain - has been inhibiting her from exercise. Unable to get a knee replacement due to her age.      Initial: 299 lbs   Current: 142 lbs   EWL: (Weight loss is ahead of schedule at this post surgical period.)  Vineet: 138 lbs  Current BMI is Body mass index is 26.4 kg/m².    Tolerating a regular diet-yes  Eating at least 60 grams of protein per day-yes  Following 30/60 minute rule with liquids-yes  Drinking at least 64 ounces of fluid per day-yes  Drinking carbonated beverages-no  Sufficient exercise- no but tries to be active as much as possible but limited due to knee pain.   Using NSAIDs regularly-no  Using nicotine-no  Using alcohol-no  Supplements: Multivitamins iron, and Calcium - calcium supplements on hold. Tries to get it thru her diet.     EWL is 94%, which places the patient ahead of schedule for expected post surgical weight loss at this time.     The following portions of the patient's history were reviewed and updated as appropriate: allergies, current medications, past family history, past medical history, past social history, past surgical history and problem list.    Review of Systems   Constitutional: Negative.    Respiratory: Negative.     Cardiovascular: Negative.    Gastrointestinal: Negative.    Musculoskeletal:  Positive for arthralgias (should and knee pain).   Skin:  Positive for rash.   Neurological: Negative.    Psychiatric/Behavioral: Negative.           Objective:    /68   Pulse 95   Temp 98.3 °F (36.8 °C) (Tympanic)   Ht 5' 1.5\" (1.562 m)   Wt 64.4 kg (142 lb)   BMI 26.40 kg/m²      Physical Exam  Vitals and nursing note reviewed.   Constitutional:       " Appearance: Normal appearance.   Cardiovascular:      Rate and Rhythm: Normal rate and regular rhythm.      Pulses: Normal pulses.      Heart sounds: Normal heart sounds.   Pulmonary:      Effort: Pulmonary effort is normal.      Breath sounds: Normal breath sounds.   Abdominal:      General: Bowel sounds are normal.      Palpations: Abdomen is soft.      Tenderness: There is no abdominal tenderness.   Musculoskeletal:         General: Normal range of motion.   Skin:     General: Skin is warm and dry.   Neurological:      General: No focal deficit present.      Mental Status: She is alert and oriented to person, place, and time.   Psychiatric:         Mood and Affect: Mood normal.         Behavior: Behavior normal.         Thought Content: Thought content normal.         Judgment: Judgment normal.

## 2024-07-24 NOTE — PROGRESS NOTES
Date of surgery: 11/28/2022  Procedure: RNY  Performing surgeon: Dr. Roberts    Initial Weight - 299 lb  Current Weight - 142 lb  Total Body Weight Loss (EWL)-  157  EWL% - 94 %  TWB % - 53 %

## 2024-09-13 ENCOUNTER — TELEPHONE (OUTPATIENT)
Dept: PLASTIC SURGERY | Facility: CLINIC | Age: 49
End: 2024-09-13

## 2025-02-04 ENCOUNTER — OFFICE VISIT (OUTPATIENT)
Dept: FAMILY MEDICINE CLINIC | Facility: CLINIC | Age: 50
End: 2025-02-04

## 2025-02-04 VITALS
RESPIRATION RATE: 18 BRPM | HEART RATE: 111 BPM | BODY MASS INDEX: 27.27 KG/M2 | DIASTOLIC BLOOD PRESSURE: 90 MMHG | WEIGHT: 146.7 LBS | SYSTOLIC BLOOD PRESSURE: 154 MMHG | OXYGEN SATURATION: 97 % | TEMPERATURE: 98.9 F

## 2025-02-04 DIAGNOSIS — R10.13 EPIGASTRIC PAIN: Primary | ICD-10-CM

## 2025-02-04 DIAGNOSIS — R03.0 ELEVATED BLOOD PRESSURE READING: ICD-10-CM

## 2025-02-04 LAB
BACTERIA UR QL AUTO: ABNORMAL /HPF
BILIRUB UR QL STRIP: NEGATIVE
CLARITY UR: CLEAR
COLOR UR: YELLOW
GLUCOSE UR STRIP-MCNC: NEGATIVE MG/DL
HGB UR QL STRIP.AUTO: NEGATIVE
KETONES UR STRIP-MCNC: NEGATIVE MG/DL
LEUKOCYTE ESTERASE UR QL STRIP: NEGATIVE
MUCOUS THREADS UR QL AUTO: ABNORMAL
NITRITE UR QL STRIP: NEGATIVE
NON-SQ EPI CELLS URNS QL MICRO: ABNORMAL /HPF
PH UR STRIP.AUTO: 6 [PH]
PROT UR STRIP-MCNC: ABNORMAL MG/DL
RBC #/AREA URNS AUTO: ABNORMAL /HPF
SL AMB  POCT GLUCOSE, UA: NEGATIVE
SL AMB LEUKOCYTE ESTERASE,UA: ABNORMAL
SL AMB POCT BILIRUBIN,UA: NEGATIVE
SL AMB POCT BLOOD,UA: NEGATIVE
SL AMB POCT CLARITY,UA: CLEAR
SL AMB POCT COLOR,UA: YELLOW
SL AMB POCT KETONES,UA: NEGATIVE
SL AMB POCT NITRITE,UA: NEGATIVE
SL AMB POCT PH,UA: 6
SL AMB POCT SPECIFIC GRAVITY,UA: 1.03
SL AMB POCT URINE PROTEIN: NEGATIVE
SL AMB POCT UROBILINOGEN: ABNORMAL
SP GR UR STRIP.AUTO: 1.02 (ref 1–1.03)
UROBILINOGEN UR STRIP-ACNC: 6 MG/DL
WBC #/AREA URNS AUTO: ABNORMAL /HPF

## 2025-02-04 PROCEDURE — 99213 OFFICE O/P EST LOW 20 MIN: CPT | Performed by: FAMILY MEDICINE

## 2025-02-04 PROCEDURE — 81001 URINALYSIS AUTO W/SCOPE: CPT

## 2025-02-04 PROCEDURE — 81002 URINALYSIS NONAUTO W/O SCOPE: CPT | Performed by: FAMILY MEDICINE

## 2025-02-04 NOTE — ASSESSMENT & PLAN NOTE
Uncontrolled in office today  - Initial readin/93, repeat 154/90  - She is not currently on a BP regimen  - She reports drinking caffeine morning and night  - Denies smoking  - She reports recent increased stress with work  - She states she does not exercise due to chronic knee pain    Plan  -Patient provided BP logs in office today. She states she has a home BP cuff. Patient instructed to check BP morning and night and to bring logs and f/u in office in one week at annual physical

## 2025-02-04 NOTE — PROGRESS NOTES
Name: Pamella Rm      : 1975      MRN: [id number]  Encounter Provider: Miranda Aragon MD  Encounter Date: 2025   Encounter department: [facility] [place]  :  Assessment & Plan  Epigastric pain  - S/p Merry-En-Y Gastric Bypass with Dr. Roberts on 2022  - She last had a f/u with Bariatrics 2024 and has her 6 month f/u later this week  - She only takes a daily multivitamin and drinks about 64oz of water or more daily  - Reports epigastric pain that initially radiated to suprapubic region as well but is now just epigastric. She is tender on exam epigastrically today as well.   - She states she occasionally has fluctuating abdominal pain that usually resolves and endorses gas pains however she states this episode of abdominal pain feels different  - Located to epigastric area, denies sx of reflux. She states since onset of sx last Thursday, sx have improved overall  - She reports having similar pain several years ago that ended up being a UTI  - Urine dip in office today reveals leuks but no nitrites.      Will check urine culture as patient has hx of similar presentation being a UTI. Patient instructed to continue Tylenol as needed with proper hydration until she has her f/u with bariatrics on Friday.     Plan  - F/u Urine culture  - Patient to f/u with Bariatrics this , she reports Bariatrics plans to order lab work for her at that time  - Patient to complete labs and f/u in office next week for annual physical    Orders:    POCT urine dip    UA w Reflex to Microscopic w Reflex to Culture    Elevated blood pressure reading  Uncontrolled in office today  - Initial readin/93, repeat 154/90  - She is not currently on a BP regimen  - She reports drinking caffeine morning and night  - Denies smoking  - She reports recent increased stress with work  - She states she does not exercise due to chronic knee pain    Plan  -Patient provided BP logs in office  today. She states she has a home BP cuff. Patient instructed to check BP morning and night and to bring logs and f/u in office in one week at annual physical                History of Present Illness   Patient is a 50yo Female with history of RNY gastric bypass 2022. Patient states her only current medication is a multivitamin daily. Patient states last Thursday she had epigastric abdominal pain that radiated suprapubic as well but is now just localized epigastric. She also felt gassy at the time. She denies any changes in her diet. Paient states the pain was 10/10 in severity however has improved over the past several days. She states previously she has had abdominal pain that comes and goes however this one persisted. She stated she slowly resumed her diet and is not having any pain after eating. She reports that she is not exercising much at baseline due to knee pain. She states usually the pain resolves with tylenol but it did not improve it during this episode. Patient reports that she has lost about 160lbs since initial surgery. She denies any blood in stool, nausea, vomiting, diarrhea nor constipation.             Review of Systems   Constitutional:  Negative for chills and fever.   Respiratory:  Negative for shortness of breath.    Cardiovascular:  Negative for chest pain.   Gastrointestinal:  Positive for abdominal pain. Negative for blood in stool, diarrhea, nausea and vomiting.   Genitourinary:  Negative for difficulty urinating, dysuria, frequency, urgency, vaginal bleeding and vaginal pain.   Skin:  Negative for rash.   Neurological:  Negative for dizziness and headaches.       Objective   /90 (BP Location: Right arm, Patient Position: Sitting)   Pulse (!) 111   Temp 98.9 °F (37.2 °C) (Temporal)   Resp 18   Wt 66.5 kg (146 lb 11.2 oz)   SpO2 97%   BMI 27.27 kg/m²      Physical Exam  Constitutional:       Appearance: Normal appearance.   HENT:      Head: Normocephalic and atraumatic.       Nose: Nose normal.      Mouth/Throat:      Mouth: Mucous membranes are moist.      Pharynx: Oropharynx is clear.   Eyes:      Conjunctiva/sclera: Conjunctivae normal.   Cardiovascular:      Rate and Rhythm: Normal rate and regular rhythm.   Pulmonary:      Effort: Pulmonary effort is normal. No respiratory distress.      Breath sounds: Normal breath sounds.   Abdominal:      General: Abdomen is flat. Bowel sounds are normal. There is no distension.      Palpations: Abdomen is soft.      Tenderness: There is abdominal tenderness (Epigastric).   Skin:     General: Skin is warm and dry.   Neurological:      General: No focal deficit present.      Mental Status: She is alert and oriented to person, place, and time.   Psychiatric:         Mood and Affect: Mood normal.         Behavior: Behavior normal.         Miranda Aragon MD   PGY-2, Family Medicine  [facility]

## 2025-02-05 ENCOUNTER — RESULTS FOLLOW-UP (OUTPATIENT)
Dept: OTHER | Facility: HOSPITAL | Age: 50
End: 2025-02-05

## 2025-02-05 DIAGNOSIS — Z98.0 S/P BYPASS GASTROENTEROSTOMY: Primary | ICD-10-CM

## 2025-02-05 NOTE — TELEPHONE ENCOUNTER
Called patient to discuss recent elevated Urobilinogen on UA and further lab workup. All questions addressed with patient.

## 2025-02-07 ENCOUNTER — OFFICE VISIT (OUTPATIENT)
Dept: BARIATRICS | Facility: CLINIC | Age: 50
End: 2025-02-07
Payer: COMMERCIAL

## 2025-02-07 VITALS
SYSTOLIC BLOOD PRESSURE: 154 MMHG | TEMPERATURE: 98.6 F | BODY MASS INDEX: 27.23 KG/M2 | WEIGHT: 148 LBS | DIASTOLIC BLOOD PRESSURE: 90 MMHG | HEIGHT: 62 IN | HEART RATE: 107 BPM

## 2025-02-07 DIAGNOSIS — K91.2 POSTSURGICAL MALABSORPTION: ICD-10-CM

## 2025-02-07 DIAGNOSIS — R10.13 EPIGASTRIC PAIN: ICD-10-CM

## 2025-02-07 DIAGNOSIS — Z98.84 BARIATRIC SURGERY STATUS: ICD-10-CM

## 2025-02-07 DIAGNOSIS — L98.7 EXCESS SKIN: ICD-10-CM

## 2025-02-07 DIAGNOSIS — Z48.815 ENCOUNTER FOR SURGICAL AFTERCARE FOLLOWING SURGERY OF DIGESTIVE SYSTEM: Primary | ICD-10-CM

## 2025-02-07 DIAGNOSIS — E66.3 OVERWEIGHT: ICD-10-CM

## 2025-02-07 PROCEDURE — 99214 OFFICE O/P EST MOD 30 MIN: CPT | Performed by: NURSE PRACTITIONER

## 2025-02-07 NOTE — PROGRESS NOTES
Date of surgery:11/28/2022  Procedure: RNY   Performing surgeon: Dr. Roberts     Initial Weight - 299 lb  Current Weight -148 lb  Vineet Weight - 142 lb  Total Body Weight Loss (EWL)- 151%  EWL% - 91%  TWB % -51%

## 2025-02-07 NOTE — PROGRESS NOTES
Assessment/Plan:     Patient ID: Pamella Rm is a 49 y.o. female.     Bariatric Surgery Status/BMI 27  -s/p Merry-En-Y Gastric Bypass with Dr. Roberts on 11/28/2022. Presents to the office today for 2nd annual visit. Overall doing well, tolerating a regular diet. Denies having any N/V/D/C, regurgitation, reflux or dysphagia. Taking her MVI daily. Still having knee pain - has been inhibiting her from exercise. Unable to get a knee replacement due to her age.      Of note, patient with high BP and high HR. She reports this has been consistent even at home. Followed with PCP and has a f/u to see if she is to start on anti-hypertensive meds.     PLAN:   - recheck thyroid as her levels shows hx of low TSH in the past. F/U with PCP as scheduled.   - Routine follow up in 1 year for annual visit.   - Continue with healthy lifestyle, adequate protein intake of 60 gm, fluid intake of at least 64 oz.   - Continue with MVI daily.   - Activity as tolerated.   - Labs ordered and will adjust accordingly if any deficiency.   - Follow up with RD and SW as needed.       EPIGASTRIC PAIN - started last week but then resolved on her own. Unsure it is related to certain foods she ate. This has reoccurred in the past but then it would come and go. This time this lasted longer than usual. Has associated high amounts of gas with this pain. Denies nausea or vomiting.   - due to significant weight loss - rule out gallbladder etiology.   - discussed if negative can consider gas pain.   - will have her follow up sooner if needed.     Excess skin - of abdominal region, bilateral arms and and inner thighs. Since bariatric surgery, has lost 151 lbs. Believes excess skin causes her legs and arms to feel heavier hence worsening knee and shoulder pain. Has associated rash in umbilical region which she is trying to keep area clean and dry as much as possible. Uses nystatin powder PRN.   - supportive care for now. She may consider skin removal  surgery as cosmetics.   - refer to plastic.     Continued/Maintain healthy weight loss with good nutrition intakes.  Adequate hydration with at least 64oz. fluid intake.  Follow diet as discussed.  Follow vitamin and mineral recommendations as reviewed with you.  Exercise as tolerated.    Colonoscopy referral made: UTD  Mammogram - UTD    Follow-up in 1 year for annual visit.. We kindly ask that your arrive 15 minutes before your scheduled appointment time with your provider to allow our staff to room you, get your vital signs and update your chart.    Get lab work done prior to annual visit. Please call the office if you need a script.  It is recommended to check with your insurance BEFORE getting labs done to make sure they are covered by your policy.      Call our office if you have any problems with abdominal pain especially associated with fever, chills, nausea, vomiting or any other concerns.    All  Post-bariatric surgery patients should be aware that very small quantities of any alcohol can cause impairment and it is very possible not to feel the effect. The effect can be in the system for several hours.  It is also a stomach irritant.     It is advised to AVOID alcohol, Nonsteroidal antiinflammatory drugs (NSAIDS) and nicotine of all forms . Any of these can cause stomach irritation/pain.    Discussed the effects of alcohol on a bariatric patient and the increased impairment risk.     Keep up the good work!     Postsurgical Malabsorption   -At risk for malabsorption of vitamins/minerals secondary to malabsorption and restriction of intake from bariatric surgery  -Currently taking adequate postop bariatric surgery vitamin supplementation  -Last set of bariatric labs completed on 12/2023 and showed wnl  -Next set of bariatric labs ordered for approximately 2 weeks  -Patient received education about the importance of adhering to a lifelong supplementation regimen to avoid vitamin/mineral deficiencies       Diagnoses and all orders for this visit:    Encounter for surgical aftercare following surgery of digestive system  -     CBC; Future  -     Folate; Future  -     PTH, intact; Future  -     Vitamin B1, whole blood; Future  -     Vitamin A; Future  -     Vitamin B12; Future  -     Vitamin D 25 hydroxy; Future  -     Zinc; Future  -     Methylmalonic acid, serum; Future  -     Iron Panel (Includes Ferritin, Iron Sat%, Iron, and TIBC); Future  -     TSH, 3rd generation with Free T4 reflex; Future    Bariatric surgery status  -     CBC; Future  -     Folate; Future  -     PTH, intact; Future  -     Vitamin B1, whole blood; Future  -     Vitamin A; Future  -     Vitamin B12; Future  -     Vitamin D 25 hydroxy; Future  -     Zinc; Future  -     Methylmalonic acid, serum; Future  -     Iron Panel (Includes Ferritin, Iron Sat%, Iron, and TIBC); Future  -     TSH, 3rd generation with Free T4 reflex; Future    Postsurgical malabsorption  -     CBC; Future  -     Folate; Future  -     PTH, intact; Future  -     Vitamin B1, whole blood; Future  -     Vitamin A; Future  -     Vitamin B12; Future  -     Vitamin D 25 hydroxy; Future  -     Zinc; Future  -     Methylmalonic acid, serum; Future  -     Iron Panel (Includes Ferritin, Iron Sat%, Iron, and TIBC); Future  -     TSH, 3rd generation with Free T4 reflex; Future    Overweight  -     CBC; Future  -     Folate; Future  -     PTH, intact; Future  -     Vitamin B1, whole blood; Future  -     Vitamin A; Future  -     Vitamin B12; Future  -     Vitamin D 25 hydroxy; Future  -     Zinc; Future  -     Methylmalonic acid, serum; Future  -     Iron Panel (Includes Ferritin, Iron Sat%, Iron, and TIBC); Future  -     TSH, 3rd generation with Free T4 reflex; Future    BMI 27.0-27.9,adult  -     CBC; Future  -     Folate; Future  -     PTH, intact; Future  -     Vitamin B1, whole blood; Future  -     Vitamin A; Future  -     Vitamin B12; Future  -     Vitamin D 25 hydroxy; Future  -      Zinc; Future  -     Methylmalonic acid, serum; Future  -     Iron Panel (Includes Ferritin, Iron Sat%, Iron, and TIBC); Future  -     TSH, 3rd generation with Free T4 reflex; Future    Excess skin  -     Ambulatory referral to Plastic Surgery; Future         Subjective:      Patient ID: Pamella Rm is a 49 y.o. female.    -s/p Merry-En-Y Gastric Bypass with Dr. Roberts on 11/28/2022. Presents to the office today for 2nd annual visit. Overall doing well, tolerating a regular diet. Denies having any N/V/D/C, regurgitation, reflux or dysphagia. Taking her MVI daily. Still having knee pain - has been inhibiting her from exercise. Unable to get a knee replacement due to her age.      Of note, patient with high BP and high HR. She reports this has been consistent even at home. Followed with PCP and has a f/u to see if she is to start on anti-hypertensive meds.       Initial: 299 LBS  Current: 148 lbs  EWL: (Weight loss is ahead of schedule at this post surgical period.)  Vineet: 138 lbs   Current BMI is Body mass index is 27.51 kg/m².    Tolerating a regular diet-yes  Eating at least 60 grams of protein per day-yes  Following 30/60 minute rule with liquids-yes  Drinking at least 64 ounces of fluid per day-yes  Drinking carbonated beverages-no  Sufficient exercise- no but tries to be active as much as possible but limited due to knee pain.   Using NSAIDs regularly-no  Using nicotine-no  Using alcohol-no  Supplements: Multivitamins iron, and Calcium - calcium supplements on hold. Tries to get it thru her diet.     EWL is 91%, which places the patient ahead of schedule for expected post surgical weight loss at this time.     The following portions of the patient's history were reviewed and updated as appropriate: allergies, current medications, past family history, past medical history, past social history, past surgical history and problem list.    Review of Systems   Constitutional: Negative.    Respiratory: Negative.    "  Cardiovascular: Negative.    Gastrointestinal: Negative.    Musculoskeletal:  Positive for arthralgias and back pain.   Skin:  Positive for rash.   Neurological: Negative.    Psychiatric/Behavioral: Negative.           Objective:    /90 (Patient Position: Sitting, Cuff Size: Standard)   Pulse (!) 107   Temp 98.6 °F (37 °C) (Tympanic)   Ht 5' 1.5\" (1.562 m)   Wt 67.1 kg (148 lb)   BMI 27.51 kg/m²      Physical Exam  Vitals and nursing note reviewed.   Constitutional:       Appearance: Normal appearance. She is obese.   Cardiovascular:      Rate and Rhythm: Normal rate and regular rhythm.      Pulses: Normal pulses.   Pulmonary:      Effort: Pulmonary effort is normal.      Breath sounds: Normal breath sounds.   Abdominal:      General: Bowel sounds are normal.      Palpations: Abdomen is soft.   Musculoskeletal:         General: Normal range of motion.   Skin:     General: Skin is warm and dry.   Neurological:      General: No focal deficit present.      Mental Status: She is alert and oriented to person, place, and time.   Psychiatric:         Mood and Affect: Mood normal.         Behavior: Behavior normal.         Thought Content: Thought content normal.         Judgment: Judgment normal.           "

## 2025-02-08 ENCOUNTER — APPOINTMENT (OUTPATIENT)
Dept: LAB | Facility: CLINIC | Age: 50
End: 2025-02-08
Payer: COMMERCIAL

## 2025-02-08 DIAGNOSIS — Z98.84 BARIATRIC SURGERY STATUS: ICD-10-CM

## 2025-02-08 DIAGNOSIS — Z48.815 ENCOUNTER FOR SURGICAL AFTERCARE FOLLOWING SURGERY OF DIGESTIVE SYSTEM: ICD-10-CM

## 2025-02-08 DIAGNOSIS — K91.2 POSTSURGICAL MALABSORPTION: ICD-10-CM

## 2025-02-08 DIAGNOSIS — Z98.0 S/P BYPASS GASTROENTEROSTOMY: ICD-10-CM

## 2025-02-08 DIAGNOSIS — E66.3 OVERWEIGHT: ICD-10-CM

## 2025-02-08 LAB
25(OH)D3 SERPL-MCNC: 43.4 NG/ML (ref 30–100)
ALBUMIN SERPL BCG-MCNC: 3.6 G/DL (ref 3.5–5)
ALP SERPL-CCNC: 92 U/L (ref 34–104)
ALT SERPL W P-5'-P-CCNC: 31 U/L (ref 7–52)
ANION GAP SERPL CALCULATED.3IONS-SCNC: 12 MMOL/L (ref 4–13)
AST SERPL W P-5'-P-CCNC: 29 U/L (ref 13–39)
BASOPHILS # BLD AUTO: 0.01 THOUSANDS/ΜL (ref 0–0.1)
BASOPHILS NFR BLD AUTO: 0 % (ref 0–1)
BILIRUB DIRECT SERPL-MCNC: 0.26 MG/DL (ref 0–0.2)
BILIRUB SERPL-MCNC: 0.56 MG/DL (ref 0.2–1)
BUN SERPL-MCNC: 9 MG/DL (ref 5–25)
CALCIUM SERPL-MCNC: 9.8 MG/DL (ref 8.4–10.2)
CHLORIDE SERPL-SCNC: 106 MMOL/L (ref 96–108)
CO2 SERPL-SCNC: 27 MMOL/L (ref 21–32)
CREAT SERPL-MCNC: <0.2 MG/DL (ref 0.6–1.3)
EOSINOPHIL # BLD AUTO: 0.06 THOUSAND/ΜL (ref 0–0.61)
EOSINOPHIL NFR BLD AUTO: 1 % (ref 0–6)
ERYTHROCYTE [DISTWIDTH] IN BLOOD BY AUTOMATED COUNT: 13.2 % (ref 11.6–15.1)
FERRITIN SERPL-MCNC: 77 NG/ML (ref 11–307)
FOLATE SERPL-MCNC: >22.3 NG/ML
GLUCOSE P FAST SERPL-MCNC: 95 MG/DL (ref 65–99)
HCT VFR BLD AUTO: 36.1 % (ref 34.8–46.1)
HGB BLD-MCNC: 11.3 G/DL (ref 11.5–15.4)
IMM GRANULOCYTES # BLD AUTO: 0.01 THOUSAND/UL (ref 0–0.2)
IMM GRANULOCYTES NFR BLD AUTO: 0 % (ref 0–2)
IRON SATN MFR SERPL: 28 % (ref 15–50)
IRON SERPL-MCNC: 86 UG/DL (ref 50–212)
LYMPHOCYTES # BLD AUTO: 2.3 THOUSANDS/ΜL (ref 0.6–4.47)
LYMPHOCYTES NFR BLD AUTO: 52 % (ref 14–44)
MCH RBC QN AUTO: 27 PG (ref 26.8–34.3)
MCHC RBC AUTO-ENTMCNC: 31.3 G/DL (ref 31.4–37.4)
MCV RBC AUTO: 86 FL (ref 82–98)
MONOCYTES # BLD AUTO: 0.38 THOUSAND/ΜL (ref 0.17–1.22)
MONOCYTES NFR BLD AUTO: 8 % (ref 4–12)
NEUTROPHILS # BLD AUTO: 1.75 THOUSANDS/ΜL (ref 1.85–7.62)
NEUTS SEG NFR BLD AUTO: 39 % (ref 43–75)
NRBC BLD AUTO-RTO: 0 /100 WBCS
PLATELET # BLD AUTO: 177 THOUSANDS/UL (ref 149–390)
PMV BLD AUTO: 12.7 FL (ref 8.9–12.7)
POTASSIUM SERPL-SCNC: 3.2 MMOL/L (ref 3.5–5.3)
PROT SERPL-MCNC: 7.1 G/DL (ref 6.4–8.4)
PTH-INTACT SERPL-MCNC: 35.8 PG/ML (ref 12–88)
RBC # BLD AUTO: 4.18 MILLION/UL (ref 3.81–5.12)
SODIUM SERPL-SCNC: 145 MMOL/L (ref 135–147)
T4 FREE SERPL-MCNC: 5.06 NG/DL (ref 0.61–1.12)
TIBC SERPL-MCNC: 305.2 UG/DL (ref 250–450)
TRANSFERRIN SERPL-MCNC: 218 MG/DL (ref 203–362)
TSH SERPL DL<=0.05 MIU/L-ACNC: <0.01 UIU/ML (ref 0.45–4.5)
UIBC SERPL-MCNC: 219 UG/DL (ref 155–355)
VIT B12 SERPL-MCNC: 790 PG/ML (ref 180–914)
WBC # BLD AUTO: 4.51 THOUSAND/UL (ref 4.31–10.16)

## 2025-02-08 PROCEDURE — 83540 ASSAY OF IRON: CPT

## 2025-02-08 PROCEDURE — 84443 ASSAY THYROID STIM HORMONE: CPT

## 2025-02-08 PROCEDURE — 82728 ASSAY OF FERRITIN: CPT

## 2025-02-08 PROCEDURE — 80053 COMPREHEN METABOLIC PANEL: CPT

## 2025-02-08 PROCEDURE — 82746 ASSAY OF FOLIC ACID SERUM: CPT

## 2025-02-08 PROCEDURE — 83918 ORGANIC ACIDS TOTAL QUANT: CPT

## 2025-02-08 PROCEDURE — 84590 ASSAY OF VITAMIN A: CPT

## 2025-02-08 PROCEDURE — 84439 ASSAY OF FREE THYROXINE: CPT

## 2025-02-08 PROCEDURE — 82248 BILIRUBIN DIRECT: CPT

## 2025-02-08 PROCEDURE — 85025 COMPLETE CBC W/AUTO DIFF WBC: CPT

## 2025-02-08 PROCEDURE — 83970 ASSAY OF PARATHORMONE: CPT

## 2025-02-08 PROCEDURE — 82607 VITAMIN B-12: CPT

## 2025-02-08 PROCEDURE — 83550 IRON BINDING TEST: CPT

## 2025-02-08 PROCEDURE — 36415 COLL VENOUS BLD VENIPUNCTURE: CPT

## 2025-02-08 PROCEDURE — 84630 ASSAY OF ZINC: CPT

## 2025-02-08 PROCEDURE — 84425 ASSAY OF VITAMIN B-1: CPT

## 2025-02-08 PROCEDURE — 82306 VITAMIN D 25 HYDROXY: CPT

## 2025-02-11 LAB — ZINC SERPL-MCNC: 75 UG/DL (ref 44–115)

## 2025-02-12 LAB — METHYLMALONATE SERPL-SCNC: 76 NMOL/L (ref 0–378)

## 2025-02-13 LAB
VIT A SERPL-MCNC: 22.3 UG/DL (ref 20.1–62)
VIT B1 BLD-SCNC: 135.2 NMOL/L (ref 66.5–200)

## 2025-02-14 ENCOUNTER — RESULTS FOLLOW-UP (OUTPATIENT)
Dept: BARIATRICS | Facility: CLINIC | Age: 50
End: 2025-02-14

## 2025-02-14 ENCOUNTER — OFFICE VISIT (OUTPATIENT)
Dept: FAMILY MEDICINE CLINIC | Facility: CLINIC | Age: 50
End: 2025-02-14

## 2025-02-14 VITALS
WEIGHT: 148 LBS | BODY MASS INDEX: 27.23 KG/M2 | DIASTOLIC BLOOD PRESSURE: 84 MMHG | HEIGHT: 62 IN | HEART RATE: 103 BPM | OXYGEN SATURATION: 99 % | RESPIRATION RATE: 18 BRPM | TEMPERATURE: 99.2 F | SYSTOLIC BLOOD PRESSURE: 137 MMHG

## 2025-02-14 DIAGNOSIS — R10.84 GENERALIZED ABDOMINAL PAIN: ICD-10-CM

## 2025-02-14 DIAGNOSIS — Z12.31 ENCOUNTER FOR SCREENING MAMMOGRAM FOR BREAST CANCER: ICD-10-CM

## 2025-02-14 DIAGNOSIS — R79.89 LOW TSH LEVEL: ICD-10-CM

## 2025-02-14 DIAGNOSIS — R00.0 TACHYCARDIA: ICD-10-CM

## 2025-02-14 DIAGNOSIS — Z00.00 ANNUAL PHYSICAL EXAM: Primary | ICD-10-CM

## 2025-02-14 LAB — SL AMB POCT HEMOGLOBIN AIC: 5.2 (ref ?–6.5)

## 2025-02-14 PROCEDURE — 83036 HEMOGLOBIN GLYCOSYLATED A1C: CPT | Performed by: FAMILY MEDICINE

## 2025-02-14 PROCEDURE — 99396 PREV VISIT EST AGE 40-64: CPT | Performed by: FAMILY MEDICINE

## 2025-02-14 NOTE — PROGRESS NOTES
Adult Annual Physical  Name: Pamella Rm      : 1975      MRN: [id number]  Encounter Provider: Miranda Aragon MD  Encounter Date: 2025   Encounter department: [facility]    Assessment & Plan  Annual physical exam    - ASCVD score: 2.1%  - A1C: 5.2, prior history of diabetes that has since resolved since gastric bypass  - Colonoscopy: Last done 3/2024, recommended follow up in 10 years 3/2034  - PAP/HPV: due , will complete at follow up visit. Last pap 2022 NILM  - Mammogram: ordered  - Smoking cessation: Never  - Vaccinations: Flu declines, UTD on Tdap and Shingles   - Depression screening: PHQ score 0 (24), neg  - Counseled the patient on healthy lifestyle habits including diet, and exercise. She currently follows with Bariatric Surgery  - Recent labs wnl however TSH severely decreased and Potassium 3.2  -At last visit patient was noted to have 2 elevated blood pressure readings.  She kept a log over the past 10 days with the majority of blood pressure readings less than 140/90.  BP reading in office today 137/84.  Continue to monitor at office visits & home    Plan  - Lifestyle modifications as discussed  - Follow up referrals as discussed  -Return in 1 week       Orders:    POCT hemoglobin A1c    Mammo screening bilateral w 3d and cad; Future    Ambulatory Referral to Dentistry; Future    Low TSH level  TSH:    -: .033   -: <0.010   -: < 0.010  -A nuclear medicine thyroid imaging uptake study was ordered last 2024 however patient states she did not complete as she was not feeling symptomatic  -Since that time patient states she has noticed increasing anxiety, palpitations, dizziness      Plan  - Complete NM Thyroid Imaging as soon as possible  - Echo ordered  - Complete Thyroid Labs   - Patient to f/u in office in 1 week after completion of labs and imaging   Orders:    NM thyroid imaging w uptake(s); Future    Echo complete  w/ contrast if indicated; Future    Thyroid Antibodies Panel; Future    Encounter for screening mammogram for breast cancer  - Patient encouraged to complete screening mammogram     Plan  - F/U Mammogram results   Orders:    Mammo screening bilateral w 3d and cad; Future    Generalized abdominal pain  - At prior office visit beginning of February patient was endorsing epigastric abdominal pain. She states that initial pain resovled and now feels like LUQ pain  - Patient was feeling epigastric pain when she saw bariatrics who recommended RUQ US  - As patient's abdominal pain is in different locations, will order complete abdominal US to evaluate    Plan  - F/U US results  Orders:    US abdomen complete with doppler; Future    Tachycardia  - Likely in setting of severely low TSH level    Orders:    NM thyroid imaging w uptake(s); Future    Echo complete w/ contrast if indicated; Future    Thyroid Antibodies Panel; Future      Immunizations and preventive care screenings were discussed with patient today. Appropriate education was printed on patient's after visit summary.    Counseling:  Dental Health: discussed importance of regular tooth brushing, flossing, and dental visits.  Sexual health: discussed sexually transmitted diseases, partner selection, use of condoms, avoidance of unintended pregnancy, and contraceptive alternatives.  Exercise: the importance of regular exercise/physical activity was discussed. Recommend exercise 3-5 times per week for at least 30 minutes.          History of Present Illness     Adult Annual Physical:  Patient presents for annual physical.  Patient is a 49-year-old female past medical history of gastric bypass surgery presenting for her annual physical.  Patient closely follows with bariatric surgery and recently had multiple labs completed.  Recent lab work showed hypokalemia to 3.2 and severely low TSH less than 0.010.  Patient has a history of very low TSH levels however she states  "over the past year she has noticed more symptoms such as dizziness, palpitations, anxiety.  Patient also was recently seen in the office for epigastric abdominal pain she then saw bariatrics who recommended a right upper quadrant ultrasound.  However she states the epigastric pain has improved and she now has pain in her left upper quadrant.  She continues to deny any vomiting, diarrhea, constipation.     Diet and Physical Activity:  - Diet/Nutrition: well balanced diet and limited junk food.  - Exercise: no formal exercise. Has b/l knee osteoarthritis    Depression Screening:  - PHQ-2 Score: 0    General Health:  - Sleep: sleeps well and 7-8 hours of sleep on average.  - Hearing: normal hearing bilateral ears.  - Vision: wears glasses, most recent eye exam < 1 year ago and goes for regular eye exams.  - Dental: brushes teeth twice daily and no dental visits for > 1 year.    /GYN Health:  - Follows with GYN: no.   - Menopause: perimenopausal.   - Last menstrual cycle: 10/1/2024.   - History of STDs: no    Review of Systems   Constitutional:  Negative for activity change and appetite change.   Respiratory:  Negative for shortness of breath.    Cardiovascular:  Positive for palpitations. Negative for chest pain.   Gastrointestinal:  Negative for constipation, diarrhea, nausea and vomiting.   Endocrine: Positive for polydipsia. Negative for cold intolerance, heat intolerance, polyphagia and polyuria.   Neurological:  Positive for dizziness. Negative for headaches.   Psychiatric/Behavioral:  The patient is nervous/anxious.          Objective   /84 (BP Location: Left arm, Patient Position: Sitting, Cuff Size: Standard)   Pulse 103   Temp 99.2 °F (37.3 °C) (Temporal)   Resp 18   Ht 5' 1.5\" (1.562 m)   Wt 67.1 kg (148 lb)   SpO2 99%   BMI 27.51 kg/m²     Physical Exam  Constitutional:       Appearance: She is obese.   HENT:      Head: Normocephalic and atraumatic.      Right Ear: External ear normal.      " Left Ear: External ear normal.      Nose: Nose normal. No congestion.      Mouth/Throat:      Mouth: Mucous membranes are moist.      Pharynx: Oropharynx is clear. No oropharyngeal exudate or posterior oropharyngeal erythema.   Eyes:      Conjunctiva/sclera: Conjunctivae normal.      Pupils: Pupils are equal, round, and reactive to light.   Cardiovascular:      Rate and Rhythm: Normal rate and regular rhythm.      Comments: Increased JVP  Pulmonary:      Effort: Pulmonary effort is normal. No respiratory distress.      Breath sounds: Normal breath sounds. No wheezing.   Abdominal:      General: Abdomen is flat. Bowel sounds are normal. There is no distension.      Palpations: Abdomen is soft.      Tenderness: There is abdominal tenderness (LUQ tenderness to palpation).   Skin:     General: Skin is warm and dry.   Neurological:      General: No focal deficit present.      Mental Status: She is alert and oriented to person, place, and time.   Psychiatric:         Mood and Affect: Mood normal.         Behavior: Behavior normal.           Miranda Aragon MD   PGY-2, Family Medicine  [facility]

## 2025-02-14 NOTE — ASSESSMENT & PLAN NOTE
- ASCVD score: 2.1%  - A1C: 5.2, prior history of diabetes that has since resolved since gastric bypass  - Colonoscopy: Last done 3/2024, recommended follow up in 10 years 3/2034  - PAP/HPV: due 2025, will complete at follow up visit. Last pap 7/2022 NILM  - Mammogram: ordered  - Smoking cessation: Never  - Vaccinations: Flu declines, UTD on Tdap and Shingles   - Depression screening: PHQ score 0 (01/02/24), neg  - Counseled the patient on healthy lifestyle habits including diet, and exercise. She currently follows with Bariatric Surgery  - Recent labs wnl however TSH severely decreased and Potassium 3.2  -At last visit patient was noted to have 2 elevated blood pressure readings.  She kept a log over the past 10 days with the majority of blood pressure readings less than 140/90.  BP reading in office today 137/84.  Continue to monitor at office visits & home    Plan  - Lifestyle modifications as discussed  - Follow up referrals as discussed  -Return in 1 week       Orders:    POCT hemoglobin A1c    Mammo screening bilateral w 3d and cad; Future    Ambulatory Referral to Dentistry; Future

## 2025-02-15 ENCOUNTER — APPOINTMENT (OUTPATIENT)
Dept: LAB | Facility: CLINIC | Age: 50
End: 2025-02-15
Payer: COMMERCIAL

## 2025-02-15 DIAGNOSIS — R00.0 TACHYCARDIA: ICD-10-CM

## 2025-02-15 DIAGNOSIS — R79.89 LOW TSH LEVEL: ICD-10-CM

## 2025-02-15 PROCEDURE — 86800 THYROGLOBULIN ANTIBODY: CPT

## 2025-02-15 PROCEDURE — 86376 MICROSOMAL ANTIBODY EACH: CPT

## 2025-02-15 PROCEDURE — 36415 COLL VENOUS BLD VENIPUNCTURE: CPT

## 2025-02-17 LAB — THYROPEROXIDASE AB SERPL-ACNC: <9 IU/ML (ref 0–34)

## 2025-02-18 ENCOUNTER — TELEPHONE (OUTPATIENT)
Dept: PLASTIC SURGERY | Facility: CLINIC | Age: 50
End: 2025-02-18

## 2025-02-18 ENCOUNTER — RESULTS FOLLOW-UP (OUTPATIENT)
Dept: FAMILY MEDICINE CLINIC | Facility: CLINIC | Age: 50
End: 2025-02-18

## 2025-02-18 LAB — THYROGLOB AB SERPL-ACNC: <1 IU/ML (ref 0–0.9)

## 2025-02-18 NOTE — TELEPHONE ENCOUNTER
Resent criteria for panniculectomy and asked to advise when complete. Arms and legs are considered cosmetic.

## 2025-02-19 ENCOUNTER — HOSPITAL ENCOUNTER (OUTPATIENT)
Dept: RADIOLOGY | Age: 50
Discharge: HOME | End: 2025-02-19
Payer: COMMERCIAL

## 2025-02-19 VITALS — BODY MASS INDEX: 27.23 KG/M2 | HEIGHT: 62 IN | WEIGHT: 148 LBS

## 2025-02-19 DIAGNOSIS — R10.84 GENERALIZED ABDOMINAL PAIN: ICD-10-CM

## 2025-02-19 DIAGNOSIS — Z12.31 ENCOUNTER FOR SCREENING MAMMOGRAM FOR BREAST CANCER: ICD-10-CM

## 2025-02-19 DIAGNOSIS — Z00.00 ANNUAL PHYSICAL EXAM: ICD-10-CM

## 2025-02-19 PROCEDURE — 77063 BREAST TOMOSYNTHESIS BI: CPT

## 2025-02-19 PROCEDURE — 76700 US EXAM ABDOM COMPLETE: CPT

## 2025-02-19 PROCEDURE — 77067 SCR MAMMO BI INCL CAD: CPT

## 2025-02-20 ENCOUNTER — RESULTS FOLLOW-UP (OUTPATIENT)
Dept: FAMILY MEDICINE CLINIC | Facility: CLINIC | Age: 50
End: 2025-02-20

## 2025-02-20 DIAGNOSIS — R92.8 ABNORMAL FINDING ON BREAST CANCER SCREENING: ICD-10-CM

## 2025-02-20 DIAGNOSIS — R93.89 IMAGING ABNORMALITY: ICD-10-CM

## 2025-02-20 DIAGNOSIS — Z12.31 SCREENING MAMMOGRAM FOR BREAST CANCER: Primary | ICD-10-CM

## 2025-02-20 NOTE — TELEPHONE ENCOUNTER
Called patient to discuss results of her recent mammogram. Per results, b/l vascular calcifications were noted greater than for patient's age. There was noted to be calcifications of right breast at 12 and 3 o'clock  and asymmetry of right breast on MLO view. Radiology recommends : Diagnostic mammogram at the current time for the right breast, Ultrasound at the current time for the right breast, Routine screening mammogram in 1 year for the left breast. As noted on results, someone from breast imaging facility will contact patient about results. Mammogram and US orders are placed as recommended. All questions addressed with patient. She verbalized understanding of results and need for f/u imaging.       Miranda Aragon MD   PGY-2, Family Medicine  [facility]

## 2025-02-25 ENCOUNTER — RESULTS FOLLOW-UP (OUTPATIENT)
Dept: FAMILY MEDICINE CLINIC | Facility: CLINIC | Age: 50
End: 2025-02-25

## 2025-02-25 DIAGNOSIS — N28.9 RENAL LESION: Primary | ICD-10-CM

## 2025-03-03 ENCOUNTER — HOSPITAL ENCOUNTER (OUTPATIENT)
Dept: NON INVASIVE DIAGNOSTICS | Facility: CLINIC | Age: 50
Discharge: HOME | End: 2025-03-03
Payer: COMMERCIAL

## 2025-03-03 VITALS
WEIGHT: 148 LBS | DIASTOLIC BLOOD PRESSURE: 84 MMHG | HEIGHT: 62 IN | BODY MASS INDEX: 27.23 KG/M2 | HEART RATE: 92 BPM | SYSTOLIC BLOOD PRESSURE: 137 MMHG

## 2025-03-03 DIAGNOSIS — R79.89 LOW TSH LEVEL: ICD-10-CM

## 2025-03-03 DIAGNOSIS — R00.0 TACHYCARDIA: ICD-10-CM

## 2025-03-03 LAB
AORTIC ROOT: 2.6 CM
AORTIC VALVE MEAN VELOCITY: 16.6 M/S
ASCENDING AORTA: 3.6 CM
AV AREA BY CONTINUOUS VTI: 2.1 CM2
AV AREA PEAK VELOCITY: 2 CM2
AV LVOT MEAN GRADIENT: 6 MMHG
AV LVOT PEAK GRADIENT: 10 MMHG
AV MEAN PRESS GRAD SYS DOP V1V2: 13 MMHG
AV ORIFICE AREA US: 2.14 CM2
AV PEAK GRADIENT: 24 MMHG
AV VELOCITY RATIO: 0.68
AV VMAX SYS DOP: 2.43 M/S
BSA FOR ECHO PROCEDURE: 1.67 M2
DOP CALC AO VTI: 45.72 CM
DOP CALC LVOT AREA: 3.14 CM2
DOP CALC LVOT CARDIAC INDEX: 5.22 L/MIN/M2
DOP CALC LVOT CARDIAC OUTPUT: 8.72 L/MIN
DOP CALC LVOT DIAMETER: 2 CM
DOP CALC LVOT PEAK VEL VTI: 31.15 CM
DOP CALC LVOT PEAK VEL: 1.56 M/S
DOP CALC LVOT STROKE INDEX: 58.7 ML/M2
DOP CALC LVOT STROKE VOLUME: 97.81
E WAVE DECELERATION TIME: 225 MS
E/A RATIO: 1.07
FRACTIONAL SHORTENING: 27 (ref 28–44)
INTERVENTRICULAR SEPTUM IN DIASTOLE (PARASTERNAL SHORT AXIS VIEW): 1 CM
INTERVENTRICULAR SEPTUM: 1 CM (ref 0.6–1.1)
LAAS-AP2: 20.7 CM2
LAAS-AP4: 21.9 CM2
LEFT ATRIUM SIZE: 4.2 CM
LEFT ATRIUM VOLUME (MOD BIPLANE): 71 ML
LEFT ATRIUM VOLUME INDEX (MOD BIPLANE): 42.5 ML/M2
LEFT INTERNAL DIMENSION IN SYSTOLE: 3.2 CM (ref 2.1–4)
LEFT VENTRICULAR INTERNAL DIMENSION IN DIASTOLE: 4.4 CM (ref 3.5–6)
LEFT VENTRICULAR POSTERIOR WALL IN END DIASTOLE: 0.9 CM
LEFT VENTRICULAR STROKE VOLUME: 46 ML
LV EF US.2D.A4C+ESTIMATED: 55 %
LVSV (TEICH): 46 ML
MV E'TISSUE VEL-LAT: 15 CM/S
MV E'TISSUE VEL-SEP: 9 CM/S
MV PEAK A VEL: 1.08 M/S
MV PEAK E VEL: 116 CM/S
MV STENOSIS PRESSURE HALF TIME: 65 MS
MV VALVE AREA P 1/2 METHOD: 3.38
RA PRESSURE ESTIMATED: 5 MMHG
RIGHT ATRIAL 2D VOLUME: 47 ML
RIGHT ATRIUM AREA SYSTOLE A4C: 16.8 CM2
RIGHT VENTRICLE ID DIMENSION: 4.5 CM
RV PSP: 40 MMHG
SL CV LEFT ATRIUM LENGTH A2C: 5.1 CM
SL CV LV EF: 60
SL CV PED ECHO LEFT VENTRICLE DIASTOLIC VOLUME (MOD BIPLANE) 2D: 86 ML
SL CV PED ECHO LEFT VENTRICLE SYSTOLIC VOLUME (MOD BIPLANE) 2D: 40 ML
TR MAX PG: 35 MMHG
TR PEAK VELOCITY: 3 M/S
TRICUSPID ANNULAR PLANE SYSTOLIC EXCURSION: 3.3 CM
TRICUSPID VALVE PEAK REGURGITATION VELOCITY: 2.97 M/S

## 2025-03-03 PROCEDURE — 93306 TTE W/DOPPLER COMPLETE: CPT

## 2025-03-03 PROCEDURE — 93306 TTE W/DOPPLER COMPLETE: CPT | Performed by: INTERNAL MEDICINE

## 2025-03-05 ENCOUNTER — HOSPITAL ENCOUNTER (OUTPATIENT)
Dept: RADIOLOGY | Facility: HOSPITAL | Age: 50
Discharge: HOME | End: 2025-03-05
Payer: COMMERCIAL

## 2025-03-05 DIAGNOSIS — N28.9 RENAL LESION: ICD-10-CM

## 2025-03-05 PROCEDURE — 74178 CT ABD&PLV WO CNTR FLWD CNTR: CPT

## 2025-03-05 RX ADMIN — IOHEXOL 100 ML: 350 INJECTION, SOLUTION INTRAVENOUS at 07:32

## 2025-03-11 ENCOUNTER — RESULTS FOLLOW-UP (OUTPATIENT)
Dept: FAMILY MEDICINE CLINIC | Facility: CLINIC | Age: 50
End: 2025-03-11

## 2025-03-11 NOTE — TELEPHONE ENCOUNTER
Called patient to discuss results of her recent CT abdomen pelvis w/wo contrast. Discussed benign findings. She states she is no longer experiencing abdominal pain. All questions addressed with patient.         Miranda Aragon MD   PGY-2, Family Medicine  [facility]

## 2025-03-12 ENCOUNTER — HOSPITAL ENCOUNTER (OUTPATIENT)
Dept: RADIOLOGY | Facility: HOSPITAL | Age: 50
Discharge: HOME | End: 2025-03-12
Payer: COMMERCIAL

## 2025-03-12 DIAGNOSIS — R00.0 TACHYCARDIA: ICD-10-CM

## 2025-03-12 DIAGNOSIS — R79.89 LOW TSH LEVEL: ICD-10-CM

## 2025-03-12 PROCEDURE — 78014 THYROID IMAGING W/BLOOD FLOW: CPT

## 2025-03-12 PROCEDURE — A9516 IODINE I-123 SOD IODIDE MIC: HCPCS

## 2025-03-13 ENCOUNTER — HOSPITAL ENCOUNTER (OUTPATIENT)
Dept: RADIOLOGY | Facility: HOSPITAL | Age: 50
Discharge: HOME | End: 2025-03-13
Payer: COMMERCIAL

## 2025-05-01 ENCOUNTER — OFFICE VISIT (OUTPATIENT)
Dept: FAMILY MEDICINE CLINIC | Facility: CLINIC | Age: 50
End: 2025-05-01

## 2025-05-01 VITALS
TEMPERATURE: 99.2 F | SYSTOLIC BLOOD PRESSURE: 156 MMHG | DIASTOLIC BLOOD PRESSURE: 90 MMHG | WEIGHT: 159.4 LBS | RESPIRATION RATE: 18 BRPM | OXYGEN SATURATION: 98 % | BODY MASS INDEX: 29.63 KG/M2 | HEART RATE: 88 BPM

## 2025-05-01 DIAGNOSIS — L30.9 DERMATITIS: Primary | ICD-10-CM

## 2025-05-01 DIAGNOSIS — I10 ESSENTIAL HYPERTENSION: ICD-10-CM

## 2025-05-01 DIAGNOSIS — E05.00 GRAVES DISEASE: ICD-10-CM

## 2025-05-01 PROCEDURE — 99213 OFFICE O/P EST LOW 20 MIN: CPT | Performed by: FAMILY MEDICINE

## 2025-05-01 RX ORDER — HYDROCORTISONE 25 MG/G
CREAM TOPICAL 2 TIMES DAILY
Qty: 3.5 G | Refills: 0 | Status: SHIPPED | OUTPATIENT
Start: 2025-05-01 | End: 2025-05-06

## 2025-05-01 NOTE — ASSESSMENT & PLAN NOTE
- Patient has a hx of HTN but has been off medications for several years   - BP elevated in office today, initially 172/83 with a repeat of 156/90      Plan  -BP log provided to patient  -Patient to f/u in office next week will recheck BP at that time

## 2025-05-01 NOTE — PROGRESS NOTES
Name: Pamella Rm      : 1975      MRN: [id number]  Encounter Provider: Miranda Aragon MD  Encounter Date: 2025   Encounter department: [facility] [place]  :  Assessment & Plan  Dermatitis  - New onset erythema of neck and chest that started one week ago. She reports rash has begun to itch and burn in the past 3 days   - She denies any new soaps, detergent, animal nor sun exposure  - She started a new moisturizer 2 months ago but denies any reaction after initially starting   - She started Methimazole 5mg TID about 6 weeks ago but denies any adverse effects of medication  - Denies any SOB, throat swelling, lip swelling, palpitations     Plan  - Start Hydrocortisone 2x/daily to affected area for 5 days  - F/U Next week or sooner if needed. If rash unimproved will consider trialing steroids.   - ED precautions provided   - Patient has not yet established with Endocrinology for her Grave's disease. Will reach out to Endocrine provider she is scheduled with to see if a rash of this presentation is associated with Methimazole.      Updated Addendum: Discussed with Endocrinologist Dr. Hightower, she recommended to repeat TSH, free t4 and free t3, now, hold methimazole for 2-3 days and see if rashes improve. If thyroid function test is improving can consider switching to PTU at lower dose. Called patient and discussed updated plan. Patient stated she tried the hydrocortisone but found it made her rash burn more. She tried aloe and has noticed relief with the itching/burning and intensity of rash with the aloe. Patient agrees to hold her Methimazole for 3 days and will call office to update if rash worsens. Reviewed ED precautions again. Patient states she will complete lab work tomorrow AM.         Orders:    hydrocortisone 2.5 % cream; Apply topically 2 (two) times a day for 5 days    Graves disease  - Maintained on Methimazole 5mg TID  - See plan above     Plan  - Due  for repeat TSH (previously ordered)  - Complete CBC and CMP as well for medication monitoring    Orders:    CBC and differential; Future    Comprehensive metabolic panel; Future    T4, free; Future    T3, free; Future    Essential hypertension  - Patient has a hx of HTN but has been off medications for several years   - BP elevated in office today, initially 172/83 with a repeat of 156/90      Plan  -BP log provided to patient  -Patient to f/u in office next week will recheck BP at that time              History of Present Illness   Patient presenting for concerns of a rash. Patient states she noticed a red itchy rash that started on week ago. She states when she puts her usual soap to the area it stings. She started using a new moisturizer two months ago  but states she did not notice any issues when she first changed her moisturizer. An itchy/burning sensation began three days ago.  She denies any new contact with pets, cleaning supplies, change in detergent. She says last night she took a benadryl but it moderately improved her itching. However she states she only took the children's dosage. She denies recent sun exposure. Of note patient started Methimazole about 6 weeks ago but did not notice any rash nor adverse effects after taking the medication.    Rash  Pertinent negatives include no shortness of breath.     Review of Systems   Constitutional:  Positive for appetite change.   Respiratory:  Negative for shortness of breath.    Cardiovascular:  Negative for chest pain.   Gastrointestinal:  Positive for constipation.   Skin:  Positive for rash.   Neurological:  Negative for dizziness and headaches.       Objective   /90 (BP Location: Right arm, Patient Position: Sitting)   Pulse 88   Temp 99.2 °F (37.3 °C) (Temporal)   Resp 18   Wt 72.3 kg (159 lb 6.4 oz)   SpO2 98%   BMI 29.63 kg/m²      Physical Exam  HENT:      Head: Normocephalic and atraumatic.      Right Ear: External ear normal.      Left  Ear: External ear normal.      Nose: Nose normal.      Mouth/Throat:      Mouth: Mucous membranes are moist.      Pharynx: Oropharynx is clear.   Eyes:      Conjunctiva/sclera: Conjunctivae normal.   Cardiovascular:      Rate and Rhythm: Normal rate.   Pulmonary:      Effort: Pulmonary effort is normal.   Skin:     General: Skin is warm and dry.      Findings: Rash (Erythematous, non raised rash noted diffusely on neck and chest) present.   Neurological:      Mental Status: She is alert and oriented to person, place, and time.   Psychiatric:         Mood and Affect: Mood normal.         Behavior: Behavior normal.                     Miranda Aragon MD   PGY-2, Family Medicine  [facility]

## 2025-05-02 NOTE — ADDENDUM NOTE
Addended by: CYNTHIA SOLIMAN on: 5/2/2025 09:05 AM     Modules accepted: Orders     Received report from Day RN. Pt lying in bed comfortably. Pt waiting for post urination bladder scan by MD. Pt states he has some abdomen pain, MD made aware. Pt safety maintained.

## 2025-05-03 ENCOUNTER — APPOINTMENT (OUTPATIENT)
Dept: LAB | Facility: CLINIC | Age: 50
End: 2025-05-03
Payer: COMMERCIAL

## 2025-05-03 DIAGNOSIS — E05.90 HYPERTHYROIDISM: ICD-10-CM

## 2025-05-03 DIAGNOSIS — E05.00 GRAVES DISEASE: ICD-10-CM

## 2025-05-03 LAB
ALBUMIN SERPL BCG-MCNC: 3.6 G/DL (ref 3.5–5)
ALP SERPL-CCNC: 119 U/L (ref 34–104)
ALT SERPL W P-5'-P-CCNC: 16 U/L (ref 7–52)
ANION GAP SERPL CALCULATED.3IONS-SCNC: 10 MMOL/L (ref 4–13)
AST SERPL W P-5'-P-CCNC: 19 U/L (ref 13–39)
BASOPHILS # BLD AUTO: 0.01 THOUSANDS/ÂΜL (ref 0–0.1)
BASOPHILS NFR BLD AUTO: 0 % (ref 0–1)
BILIRUB SERPL-MCNC: 0.54 MG/DL (ref 0.2–1)
BUN SERPL-MCNC: 8 MG/DL (ref 5–25)
CALCIUM SERPL-MCNC: 9.5 MG/DL (ref 8.4–10.2)
CHLORIDE SERPL-SCNC: 103 MMOL/L (ref 96–108)
CO2 SERPL-SCNC: 28 MMOL/L (ref 21–32)
CREAT SERPL-MCNC: 0.28 MG/DL (ref 0.6–1.3)
EOSINOPHIL # BLD AUTO: 0.12 THOUSAND/ÂΜL (ref 0–0.61)
EOSINOPHIL NFR BLD AUTO: 2 % (ref 0–6)
ERYTHROCYTE [DISTWIDTH] IN BLOOD BY AUTOMATED COUNT: 14.3 % (ref 11.6–15.1)
GFR SERPL CREATININE-BSD FRML MDRD: 135 ML/MIN/1.73SQ M
GLUCOSE P FAST SERPL-MCNC: 91 MG/DL (ref 65–99)
HCT VFR BLD AUTO: 37.5 % (ref 34.8–46.1)
HGB BLD-MCNC: 11.5 G/DL (ref 11.5–15.4)
IMM GRANULOCYTES # BLD AUTO: 0.01 THOUSAND/UL (ref 0–0.2)
IMM GRANULOCYTES NFR BLD AUTO: 0 % (ref 0–2)
LYMPHOCYTES # BLD AUTO: 2.55 THOUSANDS/ÂΜL (ref 0.6–4.47)
LYMPHOCYTES NFR BLD AUTO: 42 % (ref 14–44)
MCH RBC QN AUTO: 27.3 PG (ref 26.8–34.3)
MCHC RBC AUTO-ENTMCNC: 30.7 G/DL (ref 31.4–37.4)
MCV RBC AUTO: 89 FL (ref 82–98)
MONOCYTES # BLD AUTO: 0.43 THOUSAND/ÂΜL (ref 0.17–1.22)
MONOCYTES NFR BLD AUTO: 7 % (ref 4–12)
NEUTROPHILS # BLD AUTO: 2.97 THOUSANDS/ÂΜL (ref 1.85–7.62)
NEUTS SEG NFR BLD AUTO: 49 % (ref 43–75)
NRBC BLD AUTO-RTO: 0 /100 WBCS
PLATELET # BLD AUTO: 205 THOUSANDS/UL (ref 149–390)
PMV BLD AUTO: 12.4 FL (ref 8.9–12.7)
POTASSIUM SERPL-SCNC: 3.7 MMOL/L (ref 3.5–5.3)
PROT SERPL-MCNC: 7.4 G/DL (ref 6.4–8.4)
RBC # BLD AUTO: 4.22 MILLION/UL (ref 3.81–5.12)
SODIUM SERPL-SCNC: 141 MMOL/L (ref 135–147)
T3FREE SERPL-MCNC: 7.43 PG/ML (ref 2.5–3.9)
T4 FREE SERPL-MCNC: 1.38 NG/DL (ref 0.61–1.12)
TSH SERPL DL<=0.05 MIU/L-ACNC: <0.01 UIU/ML (ref 0.45–4.5)
WBC # BLD AUTO: 6.09 THOUSAND/UL (ref 4.31–10.16)

## 2025-05-03 PROCEDURE — 36415 COLL VENOUS BLD VENIPUNCTURE: CPT

## 2025-05-03 PROCEDURE — 80053 COMPREHEN METABOLIC PANEL: CPT

## 2025-05-03 PROCEDURE — 84439 ASSAY OF FREE THYROXINE: CPT

## 2025-05-03 PROCEDURE — 84443 ASSAY THYROID STIM HORMONE: CPT

## 2025-05-03 PROCEDURE — 85025 COMPLETE CBC W/AUTO DIFF WBC: CPT

## 2025-05-03 PROCEDURE — 84481 FREE ASSAY (FT-3): CPT

## 2025-05-07 ENCOUNTER — OFFICE VISIT (OUTPATIENT)
Dept: FAMILY MEDICINE CLINIC | Facility: CLINIC | Age: 50
End: 2025-05-07

## 2025-05-07 VITALS
HEIGHT: 62 IN | BODY MASS INDEX: 29.85 KG/M2 | SYSTOLIC BLOOD PRESSURE: 131 MMHG | OXYGEN SATURATION: 100 % | DIASTOLIC BLOOD PRESSURE: 84 MMHG | TEMPERATURE: 98.6 F | HEART RATE: 114 BPM | WEIGHT: 162.2 LBS | RESPIRATION RATE: 18 BRPM

## 2025-05-07 DIAGNOSIS — I10 ESSENTIAL HYPERTENSION: ICD-10-CM

## 2025-05-07 DIAGNOSIS — E05.00 GRAVES DISEASE: Primary | ICD-10-CM

## 2025-05-07 PROCEDURE — 99213 OFFICE O/P EST LOW 20 MIN: CPT | Performed by: FAMILY MEDICINE

## 2025-05-07 RX ORDER — PROPYLTHIOURACIL 50 MG/1
50 TABLET ORAL 2 TIMES DAILY
Qty: 120 TABLET | Refills: 0 | Status: SHIPPED | OUTPATIENT
Start: 2025-05-07 | End: 2025-05-14 | Stop reason: SDUPTHER

## 2025-05-07 NOTE — ASSESSMENT & PLAN NOTE
-Controlled in office today 131/84  - Patient has a hx of HTN but has been off medications for several years   - BP elevated in office last week initially 172/83 with a repeat of 156/90  - In office today patient brought her BP log for the past week with readings all controlled <140/90  - She brought her home BP cuff which she compared with in office cuff, readings essentially the same         Plan  - Continue to monitor BP at home, does not need medication at this time  - Additional BP logs provided to patient  - F/U in 8-10 weeks

## 2025-05-07 NOTE — ASSESSMENT & PLAN NOTE
- 3/14/25 Thyroid uptake scan findings consistent with Graves' disease. At which time patient was started on Methimazole 5 mg 3 times daily   - Patient presented to office on 5/2/25 at which time she endorsed a new onset rash of her neck and chest. She had denied any new soaps/detergents/exposures at that time  - At time of office visit patient was instructed to trial hydrocortisone cream to the area for rash improvement. Patient tried this once and noticed rash worsened so she stopped  - After that office visit, provider contacted patient's future Endocrinologist Dr. Esquivel, who recommended repeat TSH, free t4 and free t3, now, hold methimazole for 2-3 days and see if rashes improve.   - In office today patient stated she stopped her Methimazole last Friday and has had improvement in her sx daily since stopping with complete resolution noted yesterday.  - Recent labs show continued decreased TSH, elevated T4 and T3.  - Patient states since starting the Methimazole she also noticed constipation and weight gain. However she notes signifcant improvement in her energy levels and decrease in palpitations since starting Methimazole as well.  - Discussed with Dr. Esquivel patient's rash improving given prior advice. She recommended patient start PTU 50mg BID and repeat labs in 2 weeks.   - Extensively discussed drug reaction precautions with patient who verbalized understanding.        Plan  - Do not restart Methimazole  - Start propylthiouracil 50 mg twice a day  - Discussed medication side effects and to monitor for rashes. Patient to call office if she develops rash or other side effects  - Will check TSH , free t4 and free t3 in 2 weeks, with LFT  - Patient has Endocrine appointment scheduled fo 6/13, patient to repeat blood work again prior to appointment     Orders:    propylthiouracil 50 mg tablet; Take 1 tablet (50 mg total) by mouth 2 (two) times a day    TSH, 3rd generation; Future    T4, free; Future    T3,  free; Future    Comprehensive metabolic panel; Future    CBC and differential; Future    CBC and differential; Future    Comprehensive metabolic panel; Future    TSH, 3rd generation; Future    T3, free; Future    T4, free; Future

## 2025-05-12 ENCOUNTER — TELEPHONE (OUTPATIENT)
Dept: FAMILY MEDICINE CLINIC | Facility: CLINIC | Age: 50
End: 2025-05-12

## 2025-05-12 NOTE — TELEPHONE ENCOUNTER
Called and spoke with patient in length. Confirmed pt that patient developed a rash 2-3 days after starting PTU. She described it as dry, itchy with peeling skin; also notes a burning sensation if she tries to apply cream on it. She states that it's on her face and neck. Currently improved after self-discontinuing med yesterday. (5/8 started med, 5/10 developed rash, 5/11 self-discontinued med). Denies any dyspnea, chest tightness, chest pains.     Messaged Dr. Esquivel who Dr. Aragon had been in contact with previously. Pt has had a reaction to methimazole, and now to PTU. Last T4 was on 5/3/25 and was 1.38. Asked if she could possibly be seen sooner given that patient seems to be intolerant to both medications for her Graves Disease.     Reviewed in length ED precautions and symptoms of thyroid storm. Pt states that for the last few years, she's had intermittent palpitations, chronic diarrhea, for which she had attributed to her s/p bariatric surgery. (A thyroid uptake scan was ordered a few years ago but patient never completed it until this year.) Denies any fevers/unexplained elevations in temperature, dyspnea, abdominal pains. Advised that should she additionally develop fevers with the palpitations, dyspnea, severe abdominal pains, and/or lethargy (or AMS as observed by someone else), she should go to the ED.     Will follow-up. May consider steroid cream for her current rash.    Kaitlyn London, DO  PGY-2  [facility]

## 2025-05-12 NOTE — TELEPHONE ENCOUNTER
Patient started taking Propylthiouracil on Wednesday night and on Saturday she started with a rash on her neck and face. She stopped taking the medication yesterday due to the rash. Patient wants to know what the next steps are. Please advise thank you. Patient can be reached at [phone].

## 2025-05-13 ENCOUNTER — TELEPHONE (OUTPATIENT)
Dept: ENDOCRINOLOGY | Facility: CLINIC | Age: 50
End: 2025-05-13

## 2025-05-13 NOTE — TELEPHONE ENCOUNTER
Spoke with patient and she will come in tomorrow at 3PM for consult.  Carolyn can you please add this to Dr Esquivel's schedule? She is already scheduled for June so if you could reschedule it for tomorrow at 3PM please  Thanks!

## 2025-05-13 NOTE — TELEPHONE ENCOUNTER
Left message on pts voicemail to call our office that Dr Esquivel's schedule at the end of today tomorrow and to call our office

## 2025-05-14 ENCOUNTER — HOSPITAL ENCOUNTER (OUTPATIENT)
Dept: ULTRASOUND IMAGING | Facility: CLINIC | Age: 50
Discharge: HOME | End: 2025-05-14
Payer: COMMERCIAL

## 2025-05-14 ENCOUNTER — CONSULT (OUTPATIENT)
Dept: ENDOCRINOLOGY | Facility: CLINIC | Age: 50
End: 2025-05-14
Payer: COMMERCIAL

## 2025-05-14 ENCOUNTER — HOSPITAL ENCOUNTER (OUTPATIENT)
Dept: MAMMOGRAPHY | Facility: CLINIC | Age: 50
Discharge: HOME | End: 2025-05-14
Payer: COMMERCIAL

## 2025-05-14 ENCOUNTER — RESULTS FOLLOW-UP (OUTPATIENT)
Dept: RHEUMATOLOGY | Facility: CLINIC | Age: 50
End: 2025-05-14

## 2025-05-14 VITALS
WEIGHT: 162 LBS | BODY MASS INDEX: 29.81 KG/M2 | HEART RATE: 106 BPM | HEIGHT: 62 IN | DIASTOLIC BLOOD PRESSURE: 82 MMHG | SYSTOLIC BLOOD PRESSURE: 148 MMHG | OXYGEN SATURATION: 96 %

## 2025-05-14 DIAGNOSIS — E04.0 GOITER DIFFUSE: ICD-10-CM

## 2025-05-14 DIAGNOSIS — I10 ESSENTIAL HYPERTENSION: ICD-10-CM

## 2025-05-14 DIAGNOSIS — R92.8 ABNORMAL FINDING ON BREAST CANCER SCREENING: ICD-10-CM

## 2025-05-14 DIAGNOSIS — R00.2 PALPITATIONS: ICD-10-CM

## 2025-05-14 DIAGNOSIS — E55.9 VITAMIN D DEFICIENCY: ICD-10-CM

## 2025-05-14 DIAGNOSIS — E05.00 GRAVES DISEASE: Primary | ICD-10-CM

## 2025-05-14 DIAGNOSIS — Z12.31 SCREENING MAMMOGRAM FOR BREAST CANCER: ICD-10-CM

## 2025-05-14 DIAGNOSIS — R49.0 HOARSENESS OF VOICE: ICD-10-CM

## 2025-05-14 PROCEDURE — 99205 OFFICE O/P NEW HI 60 MIN: CPT | Performed by: INTERNAL MEDICINE

## 2025-05-14 PROCEDURE — G0279 TOMOSYNTHESIS, MAMMO: HCPCS

## 2025-05-14 PROCEDURE — 76642 ULTRASOUND BREAST LIMITED: CPT

## 2025-05-14 PROCEDURE — 77065 DX MAMMO INCL CAD UNI: CPT

## 2025-05-14 RX ORDER — PREDNISONE 10 MG/1
5 TABLET ORAL DAILY
Qty: 15 TABLET | Refills: 0 | Status: SHIPPED | OUTPATIENT
Start: 2025-05-14 | End: 2025-05-22 | Stop reason: SDUPTHER

## 2025-05-14 RX ORDER — PROPYLTHIOURACIL 50 MG/1
TABLET ORAL
Start: 2025-05-14

## 2025-05-14 RX ORDER — METOPROLOL SUCCINATE 25 MG/1
25 TABLET, EXTENDED RELEASE ORAL DAILY
Qty: 30 TABLET | Refills: 5 | Status: SHIPPED | OUTPATIENT
Start: 2025-05-14

## 2025-05-14 NOTE — ASSESSMENT & PLAN NOTE
Uncontrolled, free T4 is improving.  Considering patient has rashes secondary to methimazole as well as PTU, positive goiter causing voice changes, he would benefit from surgical management for Graves' disease such as total thyroidectomy.  Will refer her to ENT surgery.  Until then we will manage carefully with medications.  Discussed the treatment plan with patient and she is agreeable    Will restart PTU very small dose 25 mg daily, she develops rash/she should let us know  Start prednisone 5 mg daily  Will get in touch with ENT to see if she can be seen sooner for total thyroidectomy  Repeat thyroid blood work in 1 week, repeat liver enzymes in 1 week  Educated to go to the ER if he develops high-grade fever, nausea vomiting abdominal pain, extremity feeling tired or fatigued or palpitations associated with chest pain      Orders:    Ambulatory Referral to Otolaryngology; Future    propylthiouracil 50 mg tablet; Take half tablet daily    predniSONE 10 mg tablet; Take 0.5 tablets (5 mg total) by mouth daily    T4, free; Future    T3; Future    TSH, 3rd generation; Future    Hepatic function panel; Future    US thyroid; Future

## 2025-05-14 NOTE — ASSESSMENT & PLAN NOTE
Blood pressure slightly elevated, start metoprolol 25 mg daily  Goal for blood pressure is less than 130/80 mmHg  Discussed to check blood pressure again in 1 week and let us know

## 2025-05-14 NOTE — PROGRESS NOTES
Name: Pamella Rm      : 1975      MRN: [id number]  Encounter Provider: David Esquivel MD  Encounter Date: 2025   Encounter department: [facility] [place]    No chief complaint on file.  :  Assessment & Plan  Graves disease  Uncontrolled, free T4 is improving.  Considering patient has rashes secondary to methimazole as well as PTU, positive goiter causing voice changes, he would benefit from surgical management for Graves' disease such as total thyroidectomy.  Will refer her to ENT surgery.  Until then we will manage carefully with medications.  Discussed the treatment plan with patient and she is agreeable    Will restart PTU very small dose 25 mg daily, she develops rash/she should let us know  Start prednisone 5 mg daily  Will get in touch with ENT to see if she can be seen sooner for total thyroidectomy  Repeat thyroid blood work in 1 week, repeat liver enzymes in 1 week  Educated to go to the ER if he develops high-grade fever, nausea vomiting abdominal pain, extremity feeling tired or fatigued or palpitations associated with chest pain      Orders:    Ambulatory Referral to Otolaryngology; Future    propylthiouracil 50 mg tablet; Take half tablet daily    predniSONE 10 mg tablet; Take 0.5 tablets (5 mg total) by mouth daily    T4, free; Future    T3; Future    TSH, 3rd generation; Future    Hepatic function panel; Future    US thyroid; Future    Vitamin D deficiency  Take vitamin D3 supplementation 2000 IU daily       Goiter diffuse  Patient has diffuse goiter on palpation  Obtain ultrasound of thyroid  She may benefit from starting SSKI 8 days prior to surgery to reduce vascularity of thyroid gland  Orders:    predniSONE 10 mg tablet; Take 0.5 tablets (5 mg total) by mouth daily    US thyroid; Future    Palpitations    Orders:    metoprolol succinate (TOPROL-XL) 25 mg 24 hr tablet; Take 1 tablet (25 mg total) by mouth daily    Essential  "hypertension  Blood pressure slightly elevated, start metoprolol 25 mg daily  Goal for blood pressure is less than 130/80 mmHg  Discussed to check blood pressure again in 1 week and let us know       Hoarseness of voice  Patient has been complaining of hoarseness of voice for last few months.  Most likely related to goiter.  Will refer to ENT surgery.  Patient would benefit from thyroidectomy         Assessment & Plan        History of Present Illness   History of Present Illness    Pamella Rm is a 50 y.o. female, with gastric bypass surgery in 2022,     Was started on methimazole 5 mg 3 times daily in March 2025, she started to have rash in May 2025, was switched to propylthiouracil 50 mg twice a day on May 7, 2025.  She she took it on May 8,9, 10, 11  and started to develop new rashes and itching on her face and the neck.  She stopped medication on May 11,   Her most recent Free t4 is 1.38 , dropped from 5.0 , TSH is stil <0.01   She is here for management of hypothyroidism and Graves' disease..    Since she was started on medications, her symptoms have improved, feeling less palpitations, anxiety.  She still gets palpitations off and on, complains of exophthalmos, dry eyes, blurry vision.      Pertinent Medical History     Medical History Reviewed by provider this encounter:     .    Objective   /82 (BP Location: Left arm, Patient Position: Sitting, Cuff Size: Large)   Pulse (!) 106   Ht 5' 1.5\" (1.562 m)   Wt 73.5 kg (162 lb)   SpO2 96%   BMI 30.11 kg/m²      Body mass index is 30.11 kg/m².    She was    Physical Exam  Constitutional:       Appearance: Normal appearance.      Comments: Bilateral exophthalmos, periorbital edema present     Cardiovascular:      Pulses: Normal pulses.      Heart sounds: Normal heart sounds.   Pulmonary:      Effort: Pulmonary effort is normal.      Breath sounds: Normal breath sounds.     Musculoskeletal:         General: Normal range of motion.      Cervical back: " Normal range of motion and neck supple.      Right lower leg: No edema.      Left lower leg: No edema.     Skin:     General: Skin is warm.      Findings: No rash.     Neurological:      General: No focal deficit present.      Mental Status: She is alert and oriented to person, place, and time.     Psychiatric:         Mood and Affect: Mood normal.         Behavior: Behavior normal.       Physical Exam      Results    Labs:   Lab Results   Component Value Date    HGBA1C 5.2 02/14/2025    HGBA1C 5.3 12/26/2023    HGBA1C 5.5 05/13/2023     Lab Results   Component Value Date    CREATININE 0.28 (L) 05/03/2025    CREATININE <0.20 (L) 02/08/2025    CREATININE 0.23 (L) 12/26/2023    BUN 8 05/03/2025    K 3.7 05/03/2025     05/03/2025    CO2 28 05/03/2025     eGFR   Date Value Ref Range Status   05/03/2025 135 ml/min/1.73sq m Final     Lab Results   Component Value Date    HDL 46 (L) 12/26/2023    TRIG 75 12/26/2023     Lab Results   Component Value Date    ALT 16 05/03/2025    AST 19 05/03/2025    ALKPHOS 119 (H) 05/03/2025     Lab Results   Component Value Date    SJQ2LRGFWTQX <0.010 (L) 05/03/2025    LGM4NVEZIAOK <0.010 (L) 02/08/2025    DZJ9HVDPBWMV <0.010 (L) 12/26/2023     Lab Results   Component Value Date    FREET4 1.38 (H) 05/03/2025       There are no Patient Instructions on file for this visit.    Discussed with the patient and all questioned fully answered. She will call me if any problems arise.    Administrative Statements   I have spent a total time of 60  minutes in caring for this patient on the day of the visit/encounter including Diagnostic results, Prognosis, Risks and benefits of tx options, Instructions for management, Patient and family education, Importance of tx compliance, Risk factor reductions, Impressions, Counseling / Coordination of care, Documenting in the medical record, Reviewing/placing orders in the medical record (including tests, medications, and/or procedures), and Obtaining or  reviewing history  .

## 2025-05-15 ENCOUNTER — TELEPHONE (OUTPATIENT)
Age: 50
End: 2025-05-15

## 2025-05-15 NOTE — TELEPHONE ENCOUNTER
Pt called to ask if she can schedule with any provider with the referral that was placed for the otolaryngology. Aware that is ok.

## 2025-05-15 NOTE — PROGRESS NOTES
Met with patient and Dr. Roe  regarding recommendation for;    ___x__ RIGHT ______LEFT      ___x__Ultrasound guided  ___x___Stereotactic breast biopsy.      __X___Verbalized understanding.    Reviewed clip placement with patient, pt states understanding: Yes: ___x_ No: ____  Comments:    Blood thinners:  No: __x___ Yes: ______ What:          Biopsy teaching sheet given:  Yes: ___X___ No: ________    Pt given contact information and adv to call with any questions/needs

## 2025-05-16 ENCOUNTER — HOSPITAL ENCOUNTER (OUTPATIENT)
Dept: RADIOLOGY | Facility: HOSPITAL | Age: 50
Discharge: HOME | End: 2025-05-16
Attending: INTERNAL MEDICINE
Payer: COMMERCIAL

## 2025-05-16 DIAGNOSIS — E05.00 GRAVES DISEASE: ICD-10-CM

## 2025-05-16 DIAGNOSIS — E04.0 GOITER DIFFUSE: ICD-10-CM

## 2025-05-16 PROCEDURE — 76536 US EXAM OF HEAD AND NECK: CPT

## 2025-05-21 ENCOUNTER — TELEPHONE (OUTPATIENT)
Dept: ENDOCRINOLOGY | Facility: CLINIC | Age: 50
End: 2025-05-21

## 2025-05-21 ENCOUNTER — RESULTS FOLLOW-UP (OUTPATIENT)
Dept: ENDOCRINOLOGY | Facility: CLINIC | Age: 50
End: 2025-05-21

## 2025-05-21 ENCOUNTER — APPOINTMENT (OUTPATIENT)
Dept: LAB | Facility: CLINIC | Age: 50
End: 2025-05-21
Payer: COMMERCIAL

## 2025-05-21 DIAGNOSIS — E05.00 GRAVES DISEASE: ICD-10-CM

## 2025-05-21 DIAGNOSIS — E05.90 HYPERTHYROIDISM: ICD-10-CM

## 2025-05-21 DIAGNOSIS — E05.00 GRAVES DISEASE: Primary | ICD-10-CM

## 2025-05-21 LAB
ALBUMIN SERPL BCG-MCNC: 3.7 G/DL (ref 3.5–5)
ALP SERPL-CCNC: 110 U/L (ref 34–104)
ALT SERPL W P-5'-P-CCNC: 24 U/L (ref 7–52)
ANION GAP SERPL CALCULATED.3IONS-SCNC: 10 MMOL/L (ref 4–13)
AST SERPL W P-5'-P-CCNC: 19 U/L (ref 13–39)
BASOPHILS # BLD AUTO: 0.01 THOUSANDS/ÂΜL (ref 0–0.1)
BASOPHILS NFR BLD AUTO: 0 % (ref 0–1)
BILIRUB DIRECT SERPL-MCNC: 0.14 MG/DL (ref 0–0.2)
BILIRUB SERPL-MCNC: 0.55 MG/DL (ref 0.2–1)
BUN SERPL-MCNC: 10 MG/DL (ref 5–25)
CALCIUM SERPL-MCNC: 10 MG/DL (ref 8.4–10.2)
CHLORIDE SERPL-SCNC: 103 MMOL/L (ref 96–108)
CO2 SERPL-SCNC: 31 MMOL/L (ref 21–32)
CREAT SERPL-MCNC: <0.2 MG/DL (ref 0.6–1.3)
EOSINOPHIL # BLD AUTO: 0.07 THOUSAND/ÂΜL (ref 0–0.61)
EOSINOPHIL NFR BLD AUTO: 1 % (ref 0–6)
ERYTHROCYTE [DISTWIDTH] IN BLOOD BY AUTOMATED COUNT: 13.6 % (ref 11.6–15.1)
GLUCOSE P FAST SERPL-MCNC: 99 MG/DL (ref 65–99)
HCT VFR BLD AUTO: 37.4 % (ref 34.8–46.1)
HGB BLD-MCNC: 11.2 G/DL (ref 11.5–15.4)
IMM GRANULOCYTES # BLD AUTO: 0.01 THOUSAND/UL (ref 0–0.2)
IMM GRANULOCYTES NFR BLD AUTO: 0 % (ref 0–2)
LYMPHOCYTES # BLD AUTO: 2.86 THOUSANDS/ÂΜL (ref 0.6–4.47)
LYMPHOCYTES NFR BLD AUTO: 52 % (ref 14–44)
MCH RBC QN AUTO: 27.4 PG (ref 26.8–34.3)
MCHC RBC AUTO-ENTMCNC: 29.9 G/DL (ref 31.4–37.4)
MCV RBC AUTO: 91 FL (ref 82–98)
MONOCYTES # BLD AUTO: 0.52 THOUSAND/ÂΜL (ref 0.17–1.22)
MONOCYTES NFR BLD AUTO: 9 % (ref 4–12)
NEUTROPHILS # BLD AUTO: 2.16 THOUSANDS/ÂΜL (ref 1.85–7.62)
NEUTS SEG NFR BLD AUTO: 38 % (ref 43–75)
NRBC BLD AUTO-RTO: 0 /100 WBCS
PLATELET # BLD AUTO: 175 THOUSANDS/UL (ref 149–390)
PMV BLD AUTO: 12.1 FL (ref 8.9–12.7)
POTASSIUM SERPL-SCNC: 3.5 MMOL/L (ref 3.5–5.3)
PROT SERPL-MCNC: 7.3 G/DL (ref 6.4–8.4)
RBC # BLD AUTO: 4.09 MILLION/UL (ref 3.81–5.12)
SODIUM SERPL-SCNC: 144 MMOL/L (ref 135–147)
T3 SERPL-MCNC: 6.5 NG/ML (ref 0.9–1.8)
T3FREE SERPL-MCNC: 24.75 PG/ML (ref 2.5–3.9)
T4 FREE SERPL-MCNC: 4.88 NG/DL (ref 0.61–1.12)
TSH SERPL DL<=0.05 MIU/L-ACNC: <0.01 UIU/ML (ref 0.45–4.5)
WBC # BLD AUTO: 5.63 THOUSAND/UL (ref 4.31–10.16)

## 2025-05-21 PROCEDURE — 80053 COMPREHEN METABOLIC PANEL: CPT

## 2025-05-21 PROCEDURE — 84443 ASSAY THYROID STIM HORMONE: CPT

## 2025-05-21 PROCEDURE — 84480 ASSAY TRIIODOTHYRONINE (T3): CPT

## 2025-05-21 PROCEDURE — 82248 BILIRUBIN DIRECT: CPT

## 2025-05-21 PROCEDURE — 84481 FREE ASSAY (FT-3): CPT

## 2025-05-21 PROCEDURE — 36415 COLL VENOUS BLD VENIPUNCTURE: CPT

## 2025-05-21 PROCEDURE — 84439 ASSAY OF FREE THYROXINE: CPT

## 2025-05-21 PROCEDURE — 85025 COMPLETE CBC W/AUTO DIFF WBC: CPT

## 2025-05-21 RX ORDER — CHOLESTYRAMINE 4 G/9G
1 POWDER, FOR SUSPENSION ORAL 2 TIMES DAILY WITH MEALS
Qty: 60 PACKET | Refills: 0 | Status: SHIPPED | OUTPATIENT
Start: 2025-05-21

## 2025-05-21 NOTE — RESULT ENCOUNTER NOTE
Hello.    I have reviewed thyroid blood work results, tr is elevated, you should increase the PTU ( propylthiouracil) to 1 tablet twice a day   DO blood work in 1 week again   Continue prednisone   Did you make appt with ENT?

## 2025-05-22 ENCOUNTER — TELEPHONE (OUTPATIENT)
Dept: ENDOCRINOLOGY | Facility: CLINIC | Age: 50
End: 2025-05-22

## 2025-05-22 DIAGNOSIS — E05.90 HYPERTHYROIDISM: Primary | ICD-10-CM

## 2025-05-22 DIAGNOSIS — E05.00 GRAVES DISEASE: ICD-10-CM

## 2025-05-22 DIAGNOSIS — Z78.9 DRUG INTOLERANCE: ICD-10-CM

## 2025-05-22 DIAGNOSIS — E04.0 GOITER DIFFUSE: ICD-10-CM

## 2025-05-22 RX ORDER — PREDNISONE 10 MG/1
10 TABLET ORAL DAILY
Qty: 30 TABLET | Refills: 0 | Status: SHIPPED | OUTPATIENT
Start: 2025-05-22

## 2025-05-22 RX ORDER — POTASSIUM IODIDE 1 G/ML
SOLUTION ORAL
Qty: 30 ML | Refills: 0 | Status: SHIPPED | OUTPATIENT
Start: 2025-05-22

## 2025-05-22 NOTE — TELEPHONE ENCOUNTER
Sent message to Lizette, and Dr. Yarbrough, patient can be seen earlier because of her acuity and severity of condition.  Dr. Baure can see pt next week and will be scheduled for surgery on Friday ( next week) as pt has hyperthyroidism and intolerant to antithyroid medication.  She was restarted on PTU 50 mg twice a day, still complains of rashes on her face and neck.  Discussed to take Zyrtec once daily, Benadryl 25 mg twice a day, and apply 1% hydrocortisone cream on the rash as needed till surgery.  Also discussed to continue cholestyramine, and start SSKI, 5 drops 3 times daily, preparation for surgery for thyroid.  Discussed with patient that considering her condition with hyperthyroidism, and intolerance to antithyroid medication it would be best for her to go for thyroid surgery, also discussed that there is a rare  risk of thyroid storm during surgery, however taking medications as prescribed will help to reduce the risk   Also discussed with her to continue Toprol-XL 25 mg daily as well as prednisone daily  .  Increase the dose of prednisone to 10 mg daily  Answered all the questions of the patient satisfaction  Pt appreciated a call and verbalized understanding    Dr. Bauer's office will call patient to make appointment next week for preop exam

## 2025-05-29 ENCOUNTER — ANESTHESIA EVENT (OUTPATIENT)
Dept: PERIOP | Facility: HOSPITAL | Age: 50
End: 2025-05-29
Payer: COMMERCIAL

## 2025-05-29 NOTE — PRE-PROCEDURE INSTRUCTIONS
Pre-Surgery Instructions:   Medication Instructions    cholestyramine (QUESTRAN) 4 g packet Hold day of surgery.    metoprolol succinate (TOPROL-XL) 25 mg 24 hr tablet Take day of surgery.    Multiple Vitamins-Minerals (Womens Multi) CAPS Stop taking 7 days prior to surgery.    Potassium Iodide, Expectorant, (SSKI) 1 g/mL solution Hold day of surgery.    predniSONE 10 mg tablet Instructions provided by MD    propylthiouracil 50 mg tablet Take day of surgery.   Medication instructions for day of surgery reviewed. Please take all instructed medications with only a sip of water.       You will receive a call one business day prior to surgery with an arrival time and hospital directions. If your surgery is scheduled on a Monday, the hospital will be calling you on the Friday prior to your surgery. If you have not heard from anyone by 8pm, please call the hospital supervisor through the hospital  at [phone]. ([place] [phone] or [place] [phone]).    Do not eat or drink anything after midnight the night before your surgery, including candy, mints, lifesavers, or chewing gum. Do not drink alcohol 24hrs before your surgery. Try not to smoke at least 24hrs before your surgery.       Follow the pre surgery showering instructions as listed in the “My Surgical Experience Booklet” or otherwise provided by your surgeon's office. Do not use a blade to shave the surgical area 1 week before surgery. It is okay to use a clean electric clippers up to 24 hours before surgery. Do not apply any lotions, creams, including makeup, cologne, deodorant, or perfumes after showering on the day of your surgery. Do not use dry shampoo, hair spray, hair gel, or any type of hair products.     No contact lenses, eye make-up, or artificial eyelashes. Remove nail polish, including gel polish, and any artificial, gel, or acrylic nails if possible. Remove all jewelry including rings and body piercing jewelry.     Wear causal  clothing that is easy to take on and off. Consider your type of surgery.    Keep any valuables, jewelry, piercings at home. Please bring any specially ordered equipment (sling, braces) if indicated.    Arrange for a responsible person to drive you to and from the hospital on the day of your surgery. Please confirm the visitor policy for the day of your procedure when you receive your phone call with an arrival time.     Call the surgeon's office with any new illnesses, exposures, or additional questions prior to surgery.    Please reference your “My Surgical Experience Booklet” for additional information to prepare for your upcoming surgery.

## 2025-05-30 ENCOUNTER — HOSPITAL ENCOUNTER (OUTPATIENT)
Facility: HOSPITAL | Age: 50
Setting detail: OUTPATIENT SURGERY
Discharge: HOME | End: 2025-05-30
Attending: SPECIALIST | Admitting: SPECIALIST
Payer: COMMERCIAL

## 2025-05-30 ENCOUNTER — ANESTHESIA (OUTPATIENT)
Dept: PERIOP | Facility: HOSPITAL | Age: 50
End: 2025-05-30
Payer: COMMERCIAL

## 2025-05-30 VITALS
BODY MASS INDEX: 29.81 KG/M2 | TEMPERATURE: 98 F | OXYGEN SATURATION: 96 % | SYSTOLIC BLOOD PRESSURE: 154 MMHG | WEIGHT: 162 LBS | DIASTOLIC BLOOD PRESSURE: 66 MMHG | RESPIRATION RATE: 18 BRPM | HEIGHT: 62 IN | HEART RATE: 109 BPM

## 2025-05-30 DIAGNOSIS — Z98.890 STATUS POST TOTAL THYROIDECTOMY: Primary | ICD-10-CM

## 2025-05-30 DIAGNOSIS — Z90.89 STATUS POST TOTAL THYROIDECTOMY: Primary | ICD-10-CM

## 2025-05-30 DIAGNOSIS — E05.00 GRAVES DISEASE: ICD-10-CM

## 2025-05-30 DIAGNOSIS — Z90.89 S/P TOTAL THYROIDECTOMY: ICD-10-CM

## 2025-05-30 DIAGNOSIS — Z98.890 S/P TOTAL THYROIDECTOMY: ICD-10-CM

## 2025-05-30 LAB
GLUCOSE SERPL-MCNC: 110 MG/DL (ref 65–140)
GLUCOSE SERPL-MCNC: 127 MG/DL (ref 65–140)
PTH-INTACT P EXCISION SERPL-MCNC: 35.7 PG/ML (ref 12–88)

## 2025-05-30 PROCEDURE — 88341 IMHCHEM/IMCYTCHM EA ADD ANTB: CPT | Performed by: STUDENT IN AN ORGANIZED HEALTH CARE EDUCATION/TRAINING PROGRAM

## 2025-05-30 PROCEDURE — 88307 TISSUE EXAM BY PATHOLOGIST: CPT | Performed by: STUDENT IN AN ORGANIZED HEALTH CARE EDUCATION/TRAINING PROGRAM

## 2025-05-30 PROCEDURE — 82948 REAGENT STRIP/BLOOD GLUCOSE: CPT

## 2025-05-30 PROCEDURE — 60240 REMOVAL OF THYROID: CPT | Performed by: SPECIALIST

## 2025-05-30 PROCEDURE — 88342 IMHCHEM/IMCYTCHM 1ST ANTB: CPT | Performed by: STUDENT IN AN ORGANIZED HEALTH CARE EDUCATION/TRAINING PROGRAM

## 2025-05-30 PROCEDURE — 83970 ASSAY OF PARATHORMONE: CPT

## 2025-05-30 RX ORDER — MIDAZOLAM HYDROCHLORIDE 2 MG/2ML
INJECTION, SOLUTION INTRAMUSCULAR; INTRAVENOUS AS NEEDED
Status: DISCONTINUED | OUTPATIENT
Start: 2025-05-30 | End: 2025-05-30

## 2025-05-30 RX ORDER — ACETAMINOPHEN 10 MG/ML
INJECTION, SOLUTION INTRAVENOUS AS NEEDED
Status: DISCONTINUED | OUTPATIENT
Start: 2025-05-30 | End: 2025-05-30

## 2025-05-30 RX ORDER — HYDROMORPHONE HCL/PF 1 MG/ML
0.5 SYRINGE (ML) INJECTION
Status: COMPLETED | OUTPATIENT
Start: 2025-05-30 | End: 2025-05-30

## 2025-05-30 RX ORDER — FENTANYL CITRATE 50 UG/ML
INJECTION, SOLUTION INTRAMUSCULAR; INTRAVENOUS AS NEEDED
Status: DISCONTINUED | OUTPATIENT
Start: 2025-05-30 | End: 2025-05-30

## 2025-05-30 RX ORDER — LIDOCAINE HYDROCHLORIDE 20 MG/ML
INJECTION, SOLUTION EPIDURAL; INFILTRATION; INTRACAUDAL; PERINEURAL AS NEEDED
Status: DISCONTINUED | OUTPATIENT
Start: 2025-05-30 | End: 2025-05-30

## 2025-05-30 RX ORDER — ACETAMINOPHEN 10 MG/ML
1000 INJECTION, SOLUTION INTRAVENOUS ONCE
Status: CANCELLED | OUTPATIENT
Start: 2025-05-30

## 2025-05-30 RX ORDER — SUCCINYLCHOLINE/SOD CL,ISO/PF 100 MG/5ML
SYRINGE (ML) INTRAVENOUS AS NEEDED
Status: DISCONTINUED | OUTPATIENT
Start: 2025-05-30 | End: 2025-05-30

## 2025-05-30 RX ORDER — LIDOCAINE HYDROCHLORIDE AND EPINEPHRINE 10; 10 MG/ML; UG/ML
INJECTION, SOLUTION INFILTRATION; PERINEURAL AS NEEDED
Status: DISCONTINUED | OUTPATIENT
Start: 2025-05-30 | End: 2025-05-30 | Stop reason: HOSPADM

## 2025-05-30 RX ORDER — MAGNESIUM HYDROXIDE 1200 MG/15ML
LIQUID ORAL AS NEEDED
Status: DISCONTINUED | OUTPATIENT
Start: 2025-05-30 | End: 2025-05-30 | Stop reason: HOSPADM

## 2025-05-30 RX ORDER — DEXAMETHASONE SODIUM PHOSPHATE 10 MG/ML
INJECTION, SOLUTION INTRAMUSCULAR; INTRAVENOUS AS NEEDED
Status: DISCONTINUED | OUTPATIENT
Start: 2025-05-30 | End: 2025-05-30

## 2025-05-30 RX ORDER — SODIUM CHLORIDE, SODIUM LACTATE, POTASSIUM CHLORIDE, CALCIUM CHLORIDE 600; 310; 30; 20 MG/100ML; MG/100ML; MG/100ML; MG/100ML
INJECTION, SOLUTION INTRAVENOUS CONTINUOUS PRN
Status: DISCONTINUED | OUTPATIENT
Start: 2025-05-30 | End: 2025-05-30

## 2025-05-30 RX ORDER — OXYCODONE HYDROCHLORIDE 5 MG/1
5 TABLET ORAL EVERY 4 HOURS PRN
Refills: 0 | Status: DISCONTINUED | OUTPATIENT
Start: 2025-05-30 | End: 2025-05-30 | Stop reason: HOSPADM

## 2025-05-30 RX ORDER — OXYCODONE HYDROCHLORIDE 5 MG/1
5 TABLET ORAL EVERY 6 HOURS PRN
Qty: 10 TABLET | Refills: 0 | Status: SHIPPED | OUTPATIENT
Start: 2025-05-30 | End: 2025-06-09

## 2025-05-30 RX ORDER — CALCIUM CARBONATE 500(1250)
2 TABLET ORAL ONCE
Status: COMPLETED | OUTPATIENT
Start: 2025-05-30 | End: 2025-05-30

## 2025-05-30 RX ORDER — PROPOFOL 10 MG/ML
INJECTION, EMULSION INTRAVENOUS AS NEEDED
Status: DISCONTINUED | OUTPATIENT
Start: 2025-05-30 | End: 2025-05-30

## 2025-05-30 RX ORDER — PROPOFOL 10 MG/ML
INJECTION, EMULSION INTRAVENOUS CONTINUOUS PRN
Status: DISCONTINUED | OUTPATIENT
Start: 2025-05-30 | End: 2025-05-30

## 2025-05-30 RX ORDER — ONDANSETRON 2 MG/ML
INJECTION INTRAMUSCULAR; INTRAVENOUS AS NEEDED
Status: DISCONTINUED | OUTPATIENT
Start: 2025-05-30 | End: 2025-05-30

## 2025-05-30 RX ORDER — ACETAMINOPHEN 325 MG/1
650 TABLET ORAL EVERY 6 HOURS PRN
Status: DISCONTINUED | OUTPATIENT
Start: 2025-05-30 | End: 2025-05-30 | Stop reason: HOSPADM

## 2025-05-30 RX ORDER — B-COMPLEX WITH VITAMIN C
2 TABLET ORAL 3 TIMES DAILY
Qty: 180 TABLET | Refills: 1 | Status: SHIPPED | OUTPATIENT
Start: 2025-05-30 | End: 2025-06-10 | Stop reason: SDUPTHER

## 2025-05-30 RX ADMIN — ACETAMINOPHEN 1000 MG: 10 INJECTION INTRAVENOUS at 11:58

## 2025-05-30 RX ADMIN — FENTANYL CITRATE 50 MCG: 50 INJECTION INTRAMUSCULAR; INTRAVENOUS at 10:32

## 2025-05-30 RX ADMIN — DEXAMETHASONE SODIUM PHOSPHATE 10 MG: 10 INJECTION INTRAMUSCULAR; INTRAVENOUS at 10:32

## 2025-05-30 RX ADMIN — OXYCODONE HYDROCHLORIDE 5 MG: 5 TABLET ORAL at 14:20

## 2025-05-30 RX ADMIN — LIDOCAINE HYDROCHLORIDE 100 MG: 20 INJECTION, SOLUTION EPIDURAL; INFILTRATION; INTRACAUDAL at 10:32

## 2025-05-30 RX ADMIN — PROPOFOL 30 MG: 10 INJECTION, EMULSION INTRAVENOUS at 10:50

## 2025-05-30 RX ADMIN — ONDANSETRON 4 MG: 2 INJECTION INTRAMUSCULAR; INTRAVENOUS at 10:32

## 2025-05-30 RX ADMIN — Medication 100 MG: at 10:32

## 2025-05-30 RX ADMIN — SODIUM CHLORIDE 0.1 MCG/KG/MIN: 9 INJECTION, SOLUTION INTRAVENOUS at 10:35

## 2025-05-30 RX ADMIN — Medication 2 TABLET: at 15:07

## 2025-05-30 RX ADMIN — MIDAZOLAM HYDROCHLORIDE 2 MG: 1 INJECTION, SOLUTION INTRAMUSCULAR; INTRAVENOUS at 10:23

## 2025-05-30 RX ADMIN — SODIUM CHLORIDE 0.15 MCG/KG/MIN: 9 INJECTION, SOLUTION INTRAVENOUS at 10:38

## 2025-05-30 RX ADMIN — HYDROMORPHONE HYDROCHLORIDE 0.5 MG: 1 INJECTION, SOLUTION INTRAMUSCULAR; INTRAVENOUS; SUBCUTANEOUS at 12:50

## 2025-05-30 RX ADMIN — HYDROMORPHONE HYDROCHLORIDE 0.5 MG: 1 INJECTION, SOLUTION INTRAMUSCULAR; INTRAVENOUS; SUBCUTANEOUS at 13:00

## 2025-05-30 RX ADMIN — PROPOFOL 120 MCG/KG/MIN: 10 INJECTION, EMULSION INTRAVENOUS at 10:35

## 2025-05-30 RX ADMIN — FENTANYL CITRATE 50 MCG: 50 INJECTION INTRAMUSCULAR; INTRAVENOUS at 12:20

## 2025-05-30 RX ADMIN — SODIUM CHLORIDE, SODIUM LACTATE, POTASSIUM CHLORIDE, AND CALCIUM CHLORIDE: .6; .31; .03; .02 INJECTION, SOLUTION INTRAVENOUS at 10:16

## 2025-05-30 RX ADMIN — PROPOFOL 150 MG: 10 INJECTION, EMULSION INTRAVENOUS at 10:32

## 2025-05-30 NOTE — DISCHARGE INSTR - AVS FIRST PAGE
Thyroidectomy   WHAT YOU NEED TO KNOW:   Thyroidectomy is surgery to remove your thyroid gland.  Your thyroid is a gland in the front lower part of your neck. The thyroid makes hormones that regulate your metabolism, body temperature, and heart rate. Smaller glands called parathyroids regulate the level of calcium in your blood. You have 4 parathyroids, located on the sides of your thyroid gland. Your parathyroids will not be removed during this surgery.   DISCHARGE INSTRUCTIONS:   Medicines:  The following medicines have been ordered by your healthcare provider:  Pain medicine  can help take away or decrease pain. Do not wait until the pain is severe before you take your medicine.  Only use the oxycodone/acetaminophen for severe pain, otherwise use just acetaminophen (Tylenol).           Thyroid medicine  Has been prescribed to replace your thyroid hormone.              Calcium supplements:  It is important to take the calcium and  vitamin D as prescribed.     Take your medicine as directed.  Contact your healthcare provider if you think your medicine is not helping or if you have side effects. Tell him or her if you are allergic to any medicine. Keep a list of the medicines, vitamins, and herbs you take. Include the amounts, and when and why you take them. Bring the list or the pill bottles to follow-up visits. Carry your medicine list with you in case of an emergency.  Follow up with your endocrinologist or surgeon as directed:  You will need to return to have your wound checked and stitches removed. You may also need blood tests to monitor your calcium, parathyroid, and thyroid hormone levels. Write down your questions so you remember to ask them during your visits.  Self-care:   Rest when you need to while you heal after surgery.  Slowly start to do more each day. Return to your daily activities as directed.     Wound care:  Carefully wash your skin near the incision wound area with soap and water. Dry the  area and put on new, clean bandages as directed. Change your bandages when they get wet or dirty. You can use a mild body lotion to improve the scar.     Swallowing and voice changes:  You may have a sore throat, hoarse voice, or difficulty swallowing after surgery. These symptoms should go away after a few days.       Contact your endocrinologist or surgeon if:   You have a fever or chills.     You feel very tired and cold, gain weight for no reason, and your skin is very dry.     You vomit several times in a row.     Your incision is red, swollen, or draining pus.    You have new voice weakness or hoarseness, or it is getting worse.     You have questions or concerns about your condition or care.  Seek care immediately or call 911 if:   You have sudden tingling or muscle cramps in your face, arm, or leg.     You have muscle spasms in your legs and feet that do not go away.     Blood soaks through your bandage.    Your stitches come apart.    You have sudden swelling in your neck or difficulty swallowing.     You have trouble breathing.    You have a seizure.  © 2017 [employer] Information is for End User's use only and may not be sold, redistributed or otherwise used for commercial purposes. All illustrations and images included in CareNotes® are the copyrighted property of [employer] or [employer].  The above information is an  only. It is not intended as medical advice for individual conditions or treatments. Talk to your doctor, nurse or pharmacist before following any medical regimen to see if it is safe and effective for you.    Call Dr. Bauer with any questions or concerns: office [phone]; mobile [phone] (urgent issues).

## 2025-05-30 NOTE — ANESTHESIA POSTPROCEDURE EVALUATION
Post-Op Assessment Note    CV Status:  Stable  Pain Score: 3    Pain management: satisfactory to patient       Mental Status:  Alert   Hydration Status:  Stable   PONV Controlled:  None   Airway Patency:  Patent  Two or more mitigation strategies used for obstructive sleep apnea   Post Op Vitals Reviewed: Yes    No anethesia notable event occurred.    Staff: Anesthesiologist           Last Filed PACU Vitals:  Vitals Value Taken Time   Temp     Pulse 105 05/30/25 13:15   /77 05/30/25 13:15   Resp 16 05/30/25 13:15   SpO2 96 % 05/30/25 13:15       Modified Zac:     Vitals Value Taken Time   Activity 2 05/30/25 13:15   Respiration 2 05/30/25 13:15   Circulation 2 05/30/25 13:15   Consciousness 2 05/30/25 13:15   Oxygen Saturation 2 05/30/25 13:15     Modified Zac Score: 10

## 2025-05-30 NOTE — ANESTHESIA PREPROCEDURE EVALUATION
Procedure:  THYROIDECTOMY (Neck)    Relevant Problems   CARDIO   (+) Chest pain   (+) Essential hypertension   (+) Heart murmur      ENDO   (+) Type 2 diabetes mellitus without complication, without long-term current use of insulin (HCC)      GI/HEPATIC   (+) Chronic GERD      MUSCULOSKELETAL   (+) Primary osteoarthritis of right knee      Hx gastric bypass, no NG tube    Physical Exam    Airway     Mallampati score: II  TM Distance: >3 FB  Neck ROM: full  Mouth opening: >= 4 cm      Cardiovascular  Cardiovascular exam normal    Dental   No notable dental hx     Pulmonary  Pulmonary exam normal     Neurological  - normal exam    Other Findings  post-pubertal.    Left Ventricle: Left ventricular cavity size is normal. Wall thickness is normal. The left ventricular ejection fraction is 60%. Systolic function is normal. Wall motion is normal. Diastolic function is normal.    Right Ventricle: Right ventricular cavity size is mildly dilated. Systolic function is normal.    Left Atrium: The atrium is mildly dilated (35-41 mL/m2).    Mitral Valve: There is mild annular calcification.    Tricuspid Valve: There is mild regurgitation. The right ventricular systolic pressure is mildly elevated. The estimated right ventricular systolic pressure is 40.00 mmHg.    Pulmonic Valve: There is mild regurgitation.           Anesthesia Plan  ASA Score- 2     Anesthesia Type- general with ASA Monitors.         Additional Monitors:     Airway Plan: Oral ETT.           Plan Factors-Exercise tolerance (METS): >4 METS.    Chart reviewed. EKG reviewed. Imaging results reviewed. Existing labs reviewed. Patient summary reviewed.    Patient is not a current smoker.              Induction- intravenous.    Postoperative Plan- Plan for postoperative opioid use.   Monitoring Plan - Monitoring plan - standard ASA monitoring  Post Operative Pain Plan - plan for postoperative opioid use    Perioperative Resuscitation Plan - Level 1 - Full Code.        Informed Consent- Anesthetic plan and risks discussed with patient.  I personally reviewed this patient with the CRNA. Discussed and agreed on the Anesthesia Plan with the CRNA..      NPO Status:  No vitals data found for the desired time range.

## 2025-05-30 NOTE — H&P
Otolaryngology Pre-op note/Updated History and Physical    Date of service: 5/30/202510:10 AM      No changes from most recent clinic note. Patient to OR for thyroidectomy with nerve monitoring.      Pmh: Reviewed  Pshx: Reviewed  Social history: Reviewed  Medications: Reviewed  ROS: as above    Objective:   Vitals:    05/30/25 0921   BP: (!) 188/86   Pulse: (!) 109   Resp: 18   Temp: 98 °F (36.7 °C)   SpO2: 98%       Chest/Heart/Lungs: Breathing, perfused, unremarkable  Abd: Unremarkable  Ext: Unremarkable        The procedure was discussed with the patient, including risks, benefits, and alternatives and all questions were answered. Consent signed and in the chart.    Dayami Thompson MD  Otolaryngology--Head and Neck Surgery  [facility]  5/30/2025 10:10 AM

## 2025-05-30 NOTE — OP NOTE
OPERATIVE REPORT  PATIENT NAME: Pamella Rm    :  1975  MRN: [id number]  Pt Location: AN OR ROOM 03    SURGERY DATE: 2025    Surgeons and Role:     * Valentin Bauer MD - Primary     * Dayami Thompson MD - Assisting    Preop Diagnosis:  Graves disease [E05.00]    Post-Op Diagnosis Codes:     * Graves disease [E05.00]    Procedure(s):  THYROIDECTOMY    Specimen(s):  ID Type Source Tests Collected by Time Destination   1 : TOTAL THYROID Tissue Thyroid TISSUE EXAM Valentin Bauer MD 2025 1107        Estimated Blood Loss:   Minimal    Drains:  * No LDAs found *    Anesthesia Type:   General    Operative Indications:  Graves disease [E05.00]      Operative Findings:  Bilateral thyroid, friable  Both RLN identified and stimulated positive at end of case       Complications:   None    Procedure and Technique:  Pamella Rm was positively identified and transferred onto the operating table in the supine position. Appropriate monitoring devices were put in place, anesthesia was induced and the patient was intubated without difficulty.  A shoulder roll was placed, and the patient’s neck was examined.  An appropriate site for skin incision was demarcated 1cm below the cricoid cartilage.  Before proceeding further, the timeout process was completed.  Local anesthesia in the form of 1% lidocaine with 1/100,000 epinephrine was then injected to the marked site.  The patient’s neck was then prepped and draped in the usual sterile fashion.    Skin incision was then made at the marked site in a transverse fashion using a 15 blade.  Dissection continued through subcutaneous tissues and platysma using the 15 blade and Bovie cautery.  Superficial flaps were then elevated in the subplatysmal plane using Bovie cautery, and these flaps were held in a retracted position using 2-0 silk stitches.  Dissection then continued in midline in between strap musculature using DeBakey forceps and Bovie cautery.  Strap  muscles were then elevated off the underlying thyroid gland on the left side using Bovie cautery and blunt dissection.  The strap muscles were then held in a retracted position using an [employer] retractor.    Dissection then continued around the lobe of the thyroid gland, immediately adjacent to the capsule of the gland using bipolar cautery.  This dissection was carried out along the superior aspect of the isthmus, extending along the medial aspect of the upper pole.  Care was taken to elevate a pyramidal lobe that was encountered, and it was reflected inferiorly to remain in continuity with the isthmus.  The superior pole was retracted medially and inferiorly, and dissection continued around the lateral aspect of the superior lobe. The superior pedicle was divided using bipolar cautery.  This allowed further reflection and retraction of the gland medially, and dissection continued inferiorly, with care taken to remain as close as possible to the capsule of the gland to minimize risk of injury or sacrifice of parathyroid glands.  With continued dissection, the recurrent laryngeal nerve was identified.  Remaining tissue attachments at Berry’s ligament were then divided using bipolar cautery, with care taken to limit extent of dissection at the point of entry of the recurrent laryngeal nerve.  Remaining attachments to the anterior trachea were divided using bipolar cautery.    Attention was then turned to the right lobe.  Strap muscles were elevated off the underlying thyroid gland on the right side using Bovie cautery and blunt dissection.  The strap muscles were then held in a retracted position using an [employer] retractor.    Dissection then continued around the lobe of the thyroid gland, immediately adjacent to the capsule of the gland using bipolar cautery.  This dissection was carried out along the superior aspect of the isthmus, extending along the medial aspect of the upper pole, while retracting the left  lobe and isthmus toward the left. The superior pole was retracted medially and inferiorly, and dissection continued around the lateral aspect of the superior lobe. The superior pedicle was divided using bipolar cautery.  This allowed further reflection and retraction of the gland medially, and dissection continued inferiorly, with care taken to remain as close as possible to the capsule of the gland to minimize risk of injury or sacrifice of parathyroid glands.  With continued dissection, the recurrent laryngeal nerve was identified.  Remaining tissue attachments at Berry’s ligament were then divided using bipolar cautery, with care taken to limit extent of dissection at the point of entry of the recurrent laryngeal nerve.  Remaining attachments to the anterior trachea were divided using bipolar cautery, and the entire thyroid gland was removed, and carefully examined.  No adherent parathyroid glands were noted, and the gland was set aside to send to pathology for permanent section evaluation.     The surgical site was irrigated with saline, and hemostasis was ensured using bipolar cautery.  Diana hemostatic agent was applied. The surgical site was then closed in multiple layers.  Strap muscles were re-approximated across midline using 3-0 Vicryl stitches in a figure of eight fashion, and the same stitch was used in an interrupted fashion to reapproximate the plastysma and tissue in midline at the same plane.  Skin was closed using a 4-0 Monocryl stitch in a running sub-cuticular fashion, followed by a Steristrip.    Anesthesia was then reversed, and the patient was awakened, extubated and taken to the recovery room in stable condition. All counts were correct at the end of the case, and no complications were encountered.      Dr. Bauer was present for the entire procedure.    Patient Disposition:  PACU          SIGNATURE: Dayami Thompson MD  DATE: May 30, 2025  TIME: 12:13 PM

## 2025-05-30 NOTE — ANESTHESIA POSTPROCEDURE EVALUATION
Post-Op Assessment Note    CV Status:  Stable  Pain Score: 1    Pain management: satisfactory to patient       Mental Status:  Alert and sleepy   Hydration Status:  Stable   PONV Controlled:  None   Airway Patency:  Patent  Airway: intubated  Two or more mitigation strategies used for obstructive sleep apnea   Post Op Vitals Reviewed: Yes    No anethesia notable event occurred.    Staff: CRNA           Last Filed PACU Vitals:  Vitals Value Taken Time   Temp 97.5    Pulse 95 05/30/25 12:23   /73 05/30/25 12:23   Resp 21 05/30/25 12:23   SpO2 100 % 05/30/25 12:23       Modified Zac:     Vitals Value Taken Time   Activity 0 05/30/25 12:23   Respiration 2 05/30/25 12:23   Circulation 2 05/30/25 12:23   Consciousness 1 05/30/25 12:23   Oxygen Saturation 1 05/30/25 12:23     Modified Zac Score: 6

## 2025-06-04 ENCOUNTER — TELEPHONE (OUTPATIENT)
Age: 50
End: 2025-06-04

## 2025-06-04 NOTE — TELEPHONE ENCOUNTER
Patient calling in to report she had her thyroidectomy done on 5/30- states she feels good and everything went well. She is asking what the next steps are for her- asking if she needs to schedule a f/u with provider post surgery and is asking if she will need to be placed on any new medications post thyroidectomy?  Please advise, patient is requesting a cb. Thank you

## 2025-06-06 ENCOUNTER — HOSPITAL ENCOUNTER (OUTPATIENT)
Dept: MAMMOGRAPHY | Facility: CLINIC | Age: 50
Discharge: HOME | End: 2025-06-06

## 2025-06-09 ENCOUNTER — APPOINTMENT (OUTPATIENT)
Dept: LAB | Facility: CLINIC | Age: 50
End: 2025-06-09
Attending: INTERNAL MEDICINE
Payer: COMMERCIAL

## 2025-06-09 ENCOUNTER — OFFICE VISIT (OUTPATIENT)
Dept: ENDOCRINOLOGY | Facility: CLINIC | Age: 50
End: 2025-06-09
Payer: COMMERCIAL

## 2025-06-09 VITALS
BODY MASS INDEX: 30 KG/M2 | OXYGEN SATURATION: 98 % | WEIGHT: 163 LBS | DIASTOLIC BLOOD PRESSURE: 88 MMHG | HEIGHT: 62 IN | TEMPERATURE: 98.4 F | HEART RATE: 84 BPM | SYSTOLIC BLOOD PRESSURE: 152 MMHG

## 2025-06-09 DIAGNOSIS — C73 PAPILLARY THYROID CARCINOMA (HCC): ICD-10-CM

## 2025-06-09 DIAGNOSIS — E05.00 GRAVES DISEASE: ICD-10-CM

## 2025-06-09 DIAGNOSIS — Z98.890 HISTORY OF THYROID SURGERY: ICD-10-CM

## 2025-06-09 DIAGNOSIS — E89.0 POST-SURGICAL HYPOTHYROIDISM: Primary | ICD-10-CM

## 2025-06-09 DIAGNOSIS — E55.9 VITAMIN D DEFICIENCY: ICD-10-CM

## 2025-06-09 DIAGNOSIS — E11.9 TYPE 2 DIABETES MELLITUS WITHOUT COMPLICATION, WITHOUT LONG-TERM CURRENT USE OF INSULIN (HCC): ICD-10-CM

## 2025-06-09 LAB
T3 SERPL-MCNC: 0.8 NG/ML (ref 0.9–1.8)
T4 FREE SERPL-MCNC: 0.57 NG/DL (ref 0.61–1.12)
TSH SERPL DL<=0.05 MIU/L-ACNC: <0.01 UIU/ML (ref 0.45–4.5)

## 2025-06-09 PROCEDURE — 84480 ASSAY TRIIODOTHYRONINE (T3): CPT

## 2025-06-09 PROCEDURE — 84439 ASSAY OF FREE THYROXINE: CPT

## 2025-06-09 PROCEDURE — 99215 OFFICE O/P EST HI 40 MIN: CPT | Performed by: INTERNAL MEDICINE

## 2025-06-09 PROCEDURE — 36415 COLL VENOUS BLD VENIPUNCTURE: CPT

## 2025-06-09 PROCEDURE — 84443 ASSAY THYROID STIM HORMONE: CPT

## 2025-06-09 RX ORDER — LEVOTHYROXINE SODIUM 125 UG/1
TABLET ORAL
Qty: 30 TABLET | Refills: 5 | Status: SHIPPED | OUTPATIENT
Start: 2025-06-09

## 2025-06-09 RX ORDER — CALCIUM CARBONATE/VITAMIN D3 500MG-5MCG
2 TABLET ORAL 3 TIMES DAILY
COMMUNITY
Start: 2025-05-30

## 2025-06-09 NOTE — ASSESSMENT & PLAN NOTE
Lab Results   Component Value Date    HGBA1C 5.2 02/14/2025   A1c is well-controlled 5.2%, status post gastric bypass surgery.  Reinforced lifestyle modifications    Orders:    Basic metabolic panel; Future

## 2025-06-09 NOTE — PATIENT INSTRUCTIONS
Please do blood work tomorrow  Start taking levothyroxine 125 mcg daily on empty stomach 1 hour before breakfast   Take calcium and vitamin D3 supplementation 4-hour apart from levothyroxine  Goal for blood work again in 6 weeks  Follow-up in 5 months with blood work prior

## 2025-06-09 NOTE — PROGRESS NOTES
Name: Pamella Rm      : 1975      MRN: [id number]  Encounter Provider: David Esquivel MD  Encounter Date: 2025   Encounter department: [facility] [place]    Chief Complaint   Patient presents with    Follow-up     Follow up for post thyroidectomy on 2025. Labs done today results pending.    :  Assessment & Plan  Type 2 diabetes mellitus without complication, without long-term current use of insulin ([facility])    Lab Results   Component Value Date    HGBA1C 5.2 2025   A1c is well-controlled 5.2%, status post gastric bypass surgery.  Reinforced lifestyle modifications    Orders:    Basic metabolic panel; Future    Post-surgical hypothyroidism  Status post total thyroidectomy, will require levothyroxine for the rest of her life.  Educated patient to take levothyroxine 125 mcg daily on empty stomach 1 hour before breakfast.  Prescription was sent to the pharmacy.  Also educated that this is life-threatening medication and she will need to take the medication for the rest of her life.  Repeat TSH and free T4 in 6 weeks  Orders:    levothyroxine 125 mcg tablet; Take one tablet daily on empty stomach 1 hour before breakfast and 4 hours apart from calcium and vitamin D supplementation    Calcium, ionized; Future    PTH, intact; Future    T4, free; Future    TSH, 3rd generation; Future    T4, free; Future    TSH, 3rd generation; Future    Thyroglobulin; Future    Vitamin D 25 hydroxy; Future    Basic metabolic panel; Future    History of thyroid surgery  Reviewed pathology report, showed 1 cm papillary thyroid carcinoma, localized.  No lymph node involvement was seen  AJCC stage I thyroid cancer pT1, N0 M0  Goal for TSH is 0.1-0.5  Will often thyroglobulin tumor marker in 6 weeks, based on the thyroglobulin tumor marker level will decide whether she will need radioactive iodine therapy  Obtain calcium and PTH now    Orders:    Calcium, ionized; Future     PTH, intact; Future    Papillary thyroid carcinoma (HCC)  Reviewed pathology report, showed 1 cm papillary thyroid carcinoma, localized.  No lymph node involvement was seen  AJCC stage I thyroid cancer pT1, N0 M0  Goal for TSH is 0.1-0.5  Will often thyroglobulin tumor marker in 6 weeks, based on the thyroglobulin tumor marker level will decide whether she will need radioactive iodine therapy  Obtain calcium and PTH now  Orders:    Thyroglobulin; Future    Vitamin D deficiency  Continue vitamin D3 supplementation at current dose  Orders:    Vitamin D 25 hydroxy; Future      Assessment & Plan              History of Present Illness   History of Present Illness    Pamella Rm is a 50 y.o. female with medical history of Graves' disease, history of allergic reaction to methimazole as well as PTU, goiter underwent total thyroidectomy on May 30, /2025 he is here for follow-up after total thyroidectomy.  At present she is off the methimazole, prednisone and SSKI.  She is not taking levothyroxine or thyroid supplementation at this time.  She denies difficulty swallowing, tingling or numbness in her extremities or around the face, chest pain, shortness of breath.  She is currently taking calcium 600 mg, 2 tablets 3 times daily.  She takes vitamin D3 supplementation 800 IU 2 times daily.    Prior to surgery, she was managed on SSKI, prednisone, it was stopped after surgery.  Pathology report reviewed  Final Diagnosis A. Thyroid, total thyroidectomy: - Pending additional stains. - Papillary thyroid carcinoma (1.0 cm), predominantly classic variant with focal follicular variant. All margins are negative for carcinoma.Background thyroid with multinodular goiter and diffuse hyperplasia. - One lymph node, negative for metastatic carcinoma. - Reactive changes including hemosiderin laden macrophages.  SPECIMEN Procedure Total thyroidectomy . TUMOR Tumor Characteristics Tumor Site Right lobe Tumor Size Greatest Dimension  (Centimeters): 1 cmHistologic Tumor Types and Subtypes Papillary carcinoma, classic subtype Tumor Proliferative Activity Mitotic Rate Less than 3 mitoses per 2mm2 Tumor Necrosis Not identified Angioinvasion (vascular invasion) Not identified Lymphatic Invasion Not identified Extrathyroidal Extension Not identified Margin Status All margins negative for carcinoma . REGIONAL LYMPH NODES Regional Lymph Node Status All regional lymph nodes negative for tumor Number of Lymph Nodes Examined 1 Paula Level(s) Examined Level VI . pTNM CLASSIFICATION (AJCC 8th Edition) Reporting of pT, pN, and (when applicable) pM categories is based on information available to the pathologist at the timethereport is issued.   pT Category pT1a pN Category pN0a     She was started on calcium supplementation 1200 mg 3 times daily  She denies tingling or numbness in her extremities, muscle spasms    Review of Systems   Constitutional:  Negative for activity change, diaphoresis, fatigue, fever and unexpected weight change.   HENT: Negative.     Eyes:  Negative for visual disturbance.   Respiratory:  Negative for cough, chest tightness and shortness of breath.    Cardiovascular:  Negative for chest pain, palpitations and leg swelling.   Gastrointestinal:  Negative for abdominal pain, blood in stool, constipation, diarrhea, nausea and vomiting.   Endocrine: Negative for cold intolerance, heat intolerance, polydipsia, polyphagia and polyuria.   Genitourinary:  Negative for dysuria, enuresis, frequency and urgency.   Musculoskeletal:  Negative for arthralgias and myalgias.   Skin:  Negative for pallor, rash and wound.   Allergic/Immunologic: Negative.    Neurological:  Negative for dizziness, tremors, weakness and numbness.   Hematological: Negative.    Psychiatric/Behavioral: Negative.  Negative for decreased concentration.     as per HPI  Medications Ordered Prior to Encounter[1]      Medical History Reviewed by provider this encounter:      ".    Objective   /88 (BP Location: Right arm, Patient Position: Sitting, Cuff Size: Adult)   Pulse 84   Temp 98.4 °F (36.9 °C) (Temporal)   Ht 5' 1.5\" (1.562 m)   Wt 73.9 kg (163 lb)   LMP 10/05/2024 (Approximate)   SpO2 98%   BMI 30.30 kg/m²      Body mass index is 30.3 kg/m².  Wt Readings from Last 3 Encounters:   06/09/25 73.9 kg (163 lb)   05/30/25 73.5 kg (162 lb)   05/14/25 73.5 kg (162 lb)     Physical Exam  Constitutional:       Appearance: Normal appearance.   Neck:      Comments: Scar present in anterior neck  Cardiovascular:      Rate and Rhythm: Normal rate and regular rhythm.      Pulses: Normal pulses.      Heart sounds: Normal heart sounds.   Pulmonary:      Effort: Pulmonary effort is normal.      Breath sounds: Normal breath sounds.     Musculoskeletal:         General: Normal range of motion.      Cervical back: Normal range of motion and neck supple.      Right lower leg: No edema.      Left lower leg: No edema.     Skin:     General: Skin is warm and dry.      Findings: No rash.     Neurological:      General: No focal deficit present.      Mental Status: She is alert and oriented to person, place, and time.     Psychiatric:         Mood and Affect: Mood normal.         Behavior: Behavior normal.       Physical Exam      Results    Labs: I have reviewed pertinent labs including:   Lab Results   Component Value Date    HGBA1C 5.2 02/14/2025    HGBA1C 5.3 12/26/2023    HGBA1C 5.5 05/13/2023      Lab Results   Component Value Date    CREATININE <0.20 (L) 05/21/2025    CREATININE 0.28 (L) 05/03/2025    CREATININE <0.20 (L) 02/08/2025    BUN 10 05/21/2025    K 3.5 05/21/2025     05/21/2025    CO2 31 05/21/2025      eGFR   Date Value Ref Range Status   05/03/2025 135 ml/min/1.73sq m Final      HDL, Direct   Date Value Ref Range Status   12/26/2023 46 (L) >=50 mg/dL Final     Triglycerides   Date Value Ref Range Status   12/26/2023 75 See Comment mg/dL Final     Comment:     " Triglyceride:     0-9Y            <75mg/dL     10Y-17Y         <90 mg/dL       >=18Y     Normal          <150 mg/dL     Borderline High 150-199 mg/dL     High            200-499 mg/dL        Very High       >499 mg/dL    Specimen collection should occur prior to Metamizole administration due to the potential for falsely depressed results.      ALT   Date Value Ref Range Status   05/21/2025 24 7 - 52 U/L Final     Comment:     Specimen collection should occur prior to Sulfasalazine administration due to the potential for falsely depressed results.      AST   Date Value Ref Range Status   05/21/2025 19 13 - 39 U/L Final     Alkaline Phosphatase   Date Value Ref Range Status   05/21/2025 110 (H) 34 - 104 U/L Final      Lab Results   Component Value Date    GEB2QNHMZXYY <0.010 (L) 06/09/2025      Lab Results   Component Value Date    FREET4 0.57 (L) 06/09/2025    T3FREE 24.75 (H) 05/21/2025      Lab Results   Component Value Date    [id number] 43.4 02/08/2025    [id number] 53.2 12/26/2023    [id number] 71.5 05/13/2023        Patient Instructions   Please do blood work tomorrow  Start taking levothyroxine 125 mcg daily on empty stomach 1 hour before breakfast   Take calcium and vitamin D3 supplementation 4-hour apart from levothyroxine  Goal for blood work again in 6 weeks  Follow-up in 5 months with blood work prior      Discussed with the patient and all questioned fully answered. She will call me if any problems arise.    Administrative Statements   I have spent a total time of 40  minutes in caring for this patient on the day of the visit/encounter including Diagnostic results, Prognosis, Risks and benefits of tx options, Instructions for management, Patient and family education, Importance of tx compliance, Risk factor reductions, Impressions, Counseling / Coordination of care, Documenting in the medical record, Reviewing/placing orders in the medical record (including tests, medications, and/or procedures),  and Obtaining or reviewing history  .         [1]   Current Outpatient Medications on File Prior to Visit   Medication Sig Dispense Refill    calcium carbonate-vitamin D 500 mg-5 mcg per tablet Take 2 tablets by mouth 3 (three) times a day 180 tablet 1    Multiple Vitamins-Minerals (Womens Multi) CAPS Take by mouth      nystatin (MYCOSTATIN) powder Apply topically 4 (four) times a day (Patient not taking: Reported on 6/9/2025) 30 g 3    oxyCODONE (ROXICODONE) 5 immediate release tablet Take 1 tablet (5 mg total) by mouth every 6 (six) hours as needed for severe pain for up to 10 days Max Daily Amount: 20 mg (Patient not taking: Reported on 6/9/2025) 10 tablet 0    OYSCO 500 + D 500-5 MG-MCG TABS Take 2 tablets by mouth 3 (three) times a day (Patient not taking: Reported on 6/9/2025)      [DISCONTINUED] cholestyramine (QUESTRAN) 4 g packet Take 1 packet (4 g total) by mouth 2 (two) times a day with meals Dissolve and Mix with water and take it by mouth (Patient not taking: Reported on 6/9/2025) 60 packet 0    [DISCONTINUED] hydrocortisone 2.5 % cream Apply topically 2 (two) times a day for 5 days (Patient not taking: Reported on 5/14/2025) 3.5 g 0    [DISCONTINUED] metoprolol succinate (TOPROL-XL) 25 mg 24 hr tablet Take 1 tablet (25 mg total) by mouth daily (Patient not taking: Reported on 6/9/2025) 30 tablet 5    [DISCONTINUED] Potassium Iodide, Expectorant, (SSKI) 1 g/mL solution 5 drops three times daily , start Friday (Patient not taking: Reported on 6/9/2025) 30 mL 0    [DISCONTINUED] predniSONE 10 mg tablet Take 1 tablet (10 mg total) by mouth daily (Patient not taking: Reported on 6/9/2025) 30 tablet 0    [DISCONTINUED] propylthiouracil 50 mg tablet Take half tablet daily (Patient not taking: Reported on 6/9/2025)       No current facility-administered medications on file prior to visit.

## 2025-06-10 ENCOUNTER — APPOINTMENT (OUTPATIENT)
Dept: LAB | Facility: CLINIC | Age: 50
End: 2025-06-10
Attending: INTERNAL MEDICINE
Payer: COMMERCIAL

## 2025-06-10 ENCOUNTER — RESULTS FOLLOW-UP (OUTPATIENT)
Dept: ENDOCRINOLOGY | Facility: CLINIC | Age: 50
End: 2025-06-10

## 2025-06-10 DIAGNOSIS — E83.51 HYPOCALCEMIA: ICD-10-CM

## 2025-06-10 DIAGNOSIS — Z90.89 S/P TOTAL THYROIDECTOMY: ICD-10-CM

## 2025-06-10 DIAGNOSIS — E89.0 POST-SURGICAL HYPOTHYROIDISM: ICD-10-CM

## 2025-06-10 DIAGNOSIS — Z98.890 S/P TOTAL THYROIDECTOMY: ICD-10-CM

## 2025-06-10 DIAGNOSIS — Z98.890 HISTORY OF THYROID SURGERY: ICD-10-CM

## 2025-06-10 DIAGNOSIS — E55.9 VITAMIN D DEFICIENCY: Primary | ICD-10-CM

## 2025-06-10 LAB
CA-I BLD-SCNC: 1.19 MMOL/L (ref 1.12–1.32)
PTH-INTACT SERPL-MCNC: 64.1 PG/ML (ref 12–88)

## 2025-06-10 PROCEDURE — 83970 ASSAY OF PARATHORMONE: CPT

## 2025-06-10 PROCEDURE — 36415 COLL VENOUS BLD VENIPUNCTURE: CPT

## 2025-06-10 PROCEDURE — 82330 ASSAY OF CALCIUM: CPT

## 2025-06-10 RX ORDER — B-COMPLEX WITH VITAMIN C
TABLET ORAL
Start: 2025-06-10

## 2025-06-10 NOTE — TELEPHONE ENCOUNTER
Called patient and spoke with her, reviewed PTH and ionized calcium, it is in the normal range.  PTH is 60.  Discussed with patient to reduce calcium to 1 tablet 3 times daily  Will repeat calcium again in 1 week and continue to reduce the dose

## 2025-06-12 ENCOUNTER — TELEPHONE (OUTPATIENT)
Dept: BARIATRICS | Facility: CLINIC | Age: 50
End: 2025-06-12

## 2025-06-12 NOTE — TELEPHONE ENCOUNTER
Contacted pt in regards to an appt 2/6/26 at 11 am with Demetria. Provider will be out of office and appt needed to be rescheduled. Pt r/s 2/5/26 at 11:30 am with Demetria.

## 2025-06-17 ENCOUNTER — APPOINTMENT (OUTPATIENT)
Dept: LAB | Facility: CLINIC | Age: 50
End: 2025-06-17
Attending: INTERNAL MEDICINE
Payer: COMMERCIAL

## 2025-06-17 DIAGNOSIS — E55.9 VITAMIN D DEFICIENCY: ICD-10-CM

## 2025-06-17 DIAGNOSIS — E83.51 HYPOCALCEMIA: ICD-10-CM

## 2025-06-17 LAB
ANION GAP SERPL CALCULATED.3IONS-SCNC: 11 MMOL/L (ref 4–13)
BUN SERPL-MCNC: 8 MG/DL (ref 5–25)
CALCIUM SERPL-MCNC: 9.5 MG/DL (ref 8.4–10.2)
CHLORIDE SERPL-SCNC: 100 MMOL/L (ref 96–108)
CO2 SERPL-SCNC: 28 MMOL/L (ref 21–32)
CREAT SERPL-MCNC: 0.31 MG/DL (ref 0.6–1.3)
GFR SERPL CREATININE-BSD FRML MDRD: 130 ML/MIN/1.73SQ M
GLUCOSE P FAST SERPL-MCNC: 89 MG/DL (ref 65–99)
POTASSIUM SERPL-SCNC: 4.4 MMOL/L (ref 3.5–5.3)
SODIUM SERPL-SCNC: 139 MMOL/L (ref 135–147)

## 2025-06-17 PROCEDURE — 80048 BASIC METABOLIC PNL TOTAL CA: CPT

## 2025-06-17 PROCEDURE — 36415 COLL VENOUS BLD VENIPUNCTURE: CPT

## 2025-07-14 ENCOUNTER — HOSPITAL ENCOUNTER (OUTPATIENT)
Dept: ULTRASOUND IMAGING | Facility: CLINIC | Age: 50
Discharge: HOME | End: 2025-07-14
Payer: COMMERCIAL

## 2025-07-14 ENCOUNTER — HOSPITAL ENCOUNTER (OUTPATIENT)
Dept: MAMMOGRAPHY | Facility: CLINIC | Age: 50
Discharge: HOME | End: 2025-07-14
Payer: COMMERCIAL

## 2025-07-14 VITALS — SYSTOLIC BLOOD PRESSURE: 163 MMHG | HEART RATE: 74 BPM | DIASTOLIC BLOOD PRESSURE: 97 MMHG

## 2025-07-14 DIAGNOSIS — R92.8 ABNORMAL MAMMOGRAM: ICD-10-CM

## 2025-07-14 PROCEDURE — 88305 TISSUE EXAM BY PATHOLOGIST: CPT | Performed by: STUDENT IN AN ORGANIZED HEALTH CARE EDUCATION/TRAINING PROGRAM

## 2025-07-14 PROCEDURE — 19083 BX BREAST 1ST LESION US IMAG: CPT

## 2025-07-14 PROCEDURE — A4648 IMPLANTABLE TISSUE MARKER: HCPCS

## 2025-07-14 PROCEDURE — 19081 BX BREAST 1ST LESION STRTCTC: CPT

## 2025-07-14 PROCEDURE — 19082 BX BREAST ADD LESION STRTCTC: CPT

## 2025-07-14 RX ORDER — LIDOCAINE HYDROCHLORIDE AND EPINEPHRINE BITARTRATE 20; .01 MG/ML; MG/ML
10 INJECTION, SOLUTION SUBCUTANEOUS ONCE
Status: COMPLETED | OUTPATIENT
Start: 2025-07-14 | End: 2025-07-14

## 2025-07-14 RX ORDER — LIDOCAINE HYDROCHLORIDE AND EPINEPHRINE BITARTRATE 20; .01 MG/ML; MG/ML
10 INJECTION, SOLUTION SUBCUTANEOUS ONCE
Status: DISCONTINUED | OUTPATIENT
Start: 2025-07-14 | End: 2025-07-15 | Stop reason: HOSPADM

## 2025-07-14 RX ORDER — LIDOCAINE HYDROCHLORIDE 10 MG/ML
5 INJECTION, SOLUTION EPIDURAL; INFILTRATION; INTRACAUDAL; PERINEURAL ONCE
Status: COMPLETED | OUTPATIENT
Start: 2025-07-14 | End: 2025-07-14

## 2025-07-14 RX ADMIN — LIDOCAINE HYDROCHLORIDE,EPINEPHRINE BITARTRATE 10 ML: 20; .01 INJECTION, SOLUTION INFILTRATION; PERINEURAL at 14:31

## 2025-07-14 RX ADMIN — LIDOCAINE HYDROCHLORIDE 5 ML: 10 INJECTION, SOLUTION EPIDURAL; INFILTRATION; INTRACAUDAL; PERINEURAL at 12:59

## 2025-07-14 RX ADMIN — LIDOCAINE HYDROCHLORIDE,EPINEPHRINE BITARTRATE 10 ML: 20; .01 INJECTION, SOLUTION INFILTRATION; PERINEURAL at 12:59

## 2025-07-14 RX ADMIN — LIDOCAINE HYDROCHLORIDE 5 ML: 10 INJECTION, SOLUTION EPIDURAL; INFILTRATION; INTRACAUDAL; PERINEURAL at 13:50

## 2025-07-14 RX ADMIN — LIDOCAINE HYDROCHLORIDE 5 ML: 10 INJECTION, SOLUTION EPIDURAL; INFILTRATION; INTRACAUDAL; PERINEURAL at 14:31

## 2025-07-14 NOTE — PROGRESS NOTES
Procedure type: Site # 1    _____ultrasound guided ____x_stereotactic    Breast:    _____Left ___x__Right    Location: 3 o'clock    Needle: 10G    # of passes:9 cores total ( 4 cores with calcs and 5 cores without calcs)    Clip: U shape    Time out done once again @ 1430 before starting second site    Procedure type: Site # 2    _____ultrasound guided __x___stereotactic    Breast:    _____Left __x___Right    Location: 12 o'clock    Needle: 10G    # of passes: 8 cores total (3 cores with calcs and 5 cores without calcs)    Clip: [place] Tie    Performed by: Dr. Fracnois    Pressure held for each site 8 minutes by: Maggi Carolina Strips:    ___X__yes _____no    Band aid:(pressure dressing coban and Ace Bandage applied per Doctor due to Hematoma    __X___yes_____no    Tolerated procedure:    __X___yes _____no

## 2025-07-14 NOTE — PROGRESS NOTES
Procedure type:    __x___ultrasound guided _____stereotactic    Breast:    _____Left __x___Right    Location: 10 o'clock 7cmfn    Needle:16G    # of passes:4    Clip: Open Coil    Performed by: Dr. Francois    Pressure held for 5 minutes by: Maggi Carolina Strips:    ___X__yes _____no    Band aid:    __X___yes_____no    Tolerated procedure:    __X___yes _____no

## 2025-07-16 ENCOUNTER — TELEPHONE (OUTPATIENT)
Dept: MAMMOGRAPHY | Facility: CLINIC | Age: 50
End: 2025-07-16

## 2025-07-16 DIAGNOSIS — C50.911 INVASIVE DUCTAL CARCINOMA OF BREAST, FEMALE, RIGHT (HCC): Primary | ICD-10-CM

## 2025-07-16 NOTE — TELEPHONE ENCOUNTER
A user error has taken place: encounter opened in error, closed for administrative reasons, orders placed in error, not carried out on this patient.

## 2025-07-17 ENCOUNTER — OFFICE VISIT (OUTPATIENT)
Dept: FAMILY MEDICINE CLINIC | Facility: CLINIC | Age: 50
End: 2025-07-17

## 2025-07-17 VITALS
SYSTOLIC BLOOD PRESSURE: 154 MMHG | WEIGHT: 175 LBS | BODY MASS INDEX: 32.2 KG/M2 | HEIGHT: 62 IN | TEMPERATURE: 98.3 F | HEART RATE: 71 BPM | DIASTOLIC BLOOD PRESSURE: 91 MMHG | OXYGEN SATURATION: 100 %

## 2025-07-17 DIAGNOSIS — I10 ESSENTIAL HYPERTENSION: Primary | ICD-10-CM

## 2025-07-17 PROCEDURE — 99213 OFFICE O/P EST LOW 20 MIN: CPT | Performed by: FAMILY MEDICINE

## 2025-07-17 RX ORDER — LISINOPRIL 5 MG/1
5 TABLET ORAL DAILY
Qty: 30 TABLET | Refills: 0 | Status: SHIPPED | OUTPATIENT
Start: 2025-07-17 | End: 2025-07-31

## 2025-07-17 NOTE — PROGRESS NOTES
Name: Pamella mR      : 1975      MRN: [id number]  Encounter Provider: Miranda Aragon MD  Encounter Date: 2025   Encounter department: [facility] [place]  :  Assessment & Plan  Essential hypertension  - Goal <130/80  - In office today 147/85, repeat 154/91  - Patient has a hx of HTN but has been off medications for several years   - In office today patients states she forgot her BP logs. She states her BP ranges from day to day. She notices a lot of emotional fluctuations. Patient notes she does not see her home BP readings >130/80 consistently  - Of note patient has had significant life stressors recently with multiple health concerns over past several months for Thyroid and Breast. Patient had a total thyroidectomy 2025 for papillary thyroid carcinoma. She thinks recent stressors have elevated her BP.        Plan  - Given patient has a strict BP goal of <130/80 will initiate Lisinopril 5mg daily at this time  - Patient to monitor BP daily-BID and bring BP logs to office in 2 weeks  - F/U in 2 weeks, complete BMP day of visit      Orders:    lisinopril (ZESTRIL) 5 mg tablet; Take 1 tablet (5 mg total) by mouth daily    Basic metabolic panel; Future           History of Present Illness   Patient is a 51yo Female presenting for HTN follow up. Of note patient has had multiple life stressors concerning health issues over recent months that she notes have raised her BP.  Patient forgot her BP logs today nor machine but states her numbers have fluctuated greatly at home. She denies any chest pain, SOB, headache, palpitations, blurry vision.      Review of Systems   Constitutional:  Negative for chills and fever.   HENT:  Negative for ear pain and sore throat.    Eyes:  Negative for pain and visual disturbance.   Respiratory:  Negative for cough and shortness of breath.    Cardiovascular:  Negative for chest pain and palpitations.   Gastrointestinal:  Negative for  "abdominal pain and vomiting.   Genitourinary:  Negative for dysuria and hematuria.   Musculoskeletal:  Negative for arthralgias and back pain.   Skin:  Negative for color change and rash.   Neurological:  Negative for seizures and syncope.   All other systems reviewed and are negative.      Objective   /91 (BP Location: Right arm, Patient Position: Sitting)   Pulse 71   Temp 98.3 °F (36.8 °C) (Temporal)   Ht 5' 1.5\" (1.562 m)   Wt 79.4 kg (175 lb)   LMP 10/05/2024 (Approximate)   SpO2 100%   BMI 32.53 kg/m²      Physical Exam  Constitutional:       General: She is not in acute distress.     Appearance: She is obese.   HENT:      Head: Normocephalic and atraumatic.      Right Ear: External ear normal.      Left Ear: External ear normal.      Nose: Nose normal.      Mouth/Throat:      Mouth: Mucous membranes are moist.      Pharynx: Oropharynx is clear.     Eyes:      Conjunctiva/sclera: Conjunctivae normal.       Cardiovascular:      Rate and Rhythm: Normal rate and regular rhythm.   Pulmonary:      Effort: Pulmonary effort is normal. No respiratory distress.      Breath sounds: Normal breath sounds. No wheezing.   Abdominal:      Palpations: Abdomen is soft.      Tenderness: There is no abdominal tenderness.     Skin:     General: Skin is warm and dry.     Neurological:      General: No focal deficit present.      Mental Status: She is alert and oriented to person, place, and time.     Psychiatric:         Mood and Affect: Mood normal.         Behavior: Behavior normal.             Miranda Aragon MD   PGY-3, Family Medicine  [facility]"

## 2025-07-17 NOTE — ASSESSMENT & PLAN NOTE
- Goal <130/80  - In office today 147/85, repeat 154/91  - Patient has a hx of HTN but has been off medications for several years   - In office today patients states she forgot her BP logs. She states her BP ranges from day to day. She notices a lot of emotional fluctuations. Patient notes she does not see her home BP readings >130/80 consistently  - Of note patient has had significant life stressors recently with multiple health concerns over past several months for Thyroid and Breast. Patient had a total thyroidectomy 5/2025 for papillary thyroid carcinoma. She thinks recent stressors have elevated her BP.        Plan  - Given patient has a strict BP goal of <130/80 will initiate Lisinopril 5mg daily at this time  - Patient to monitor BP daily-BID and bring BP logs to office in 2 weeks  - F/U in 2 weeks, complete BMP day of visit      Orders:    lisinopril (ZESTRIL) 5 mg tablet; Take 1 tablet (5 mg total) by mouth daily    Basic metabolic panel; Future

## 2025-07-29 ENCOUNTER — APPOINTMENT (OUTPATIENT)
Dept: LAB | Facility: CLINIC | Age: 50
End: 2025-07-29
Payer: COMMERCIAL

## 2025-07-29 DIAGNOSIS — I10 ESSENTIAL HYPERTENSION: ICD-10-CM

## 2025-07-29 DIAGNOSIS — E89.0 POST-SURGICAL HYPOTHYROIDISM: ICD-10-CM

## 2025-07-29 LAB
ANION GAP SERPL CALCULATED.3IONS-SCNC: 9 MMOL/L (ref 4–13)
BUN SERPL-MCNC: 8 MG/DL (ref 5–25)
CALCIUM SERPL-MCNC: 9.4 MG/DL (ref 8.4–10.2)
CHLORIDE SERPL-SCNC: 104 MMOL/L (ref 96–108)
CO2 SERPL-SCNC: 29 MMOL/L (ref 21–32)
CREAT SERPL-MCNC: 0.3 MG/DL (ref 0.6–1.3)
GFR SERPL CREATININE-BSD FRML MDRD: 132 ML/MIN/1.73SQ M
GLUCOSE P FAST SERPL-MCNC: 92 MG/DL (ref 65–99)
POTASSIUM SERPL-SCNC: 4.4 MMOL/L (ref 3.5–5.3)
SODIUM SERPL-SCNC: 142 MMOL/L (ref 135–147)
T4 FREE SERPL-MCNC: 1.03 NG/DL (ref 0.61–1.12)
TSH SERPL DL<=0.05 MIU/L-ACNC: <0.01 UIU/ML (ref 0.45–4.5)

## 2025-07-29 PROCEDURE — 80048 BASIC METABOLIC PNL TOTAL CA: CPT

## 2025-07-29 PROCEDURE — 36415 COLL VENOUS BLD VENIPUNCTURE: CPT

## 2025-07-29 PROCEDURE — 84443 ASSAY THYROID STIM HORMONE: CPT

## 2025-07-29 PROCEDURE — 84439 ASSAY OF FREE THYROXINE: CPT

## 2025-07-31 ENCOUNTER — OFFICE VISIT (OUTPATIENT)
Dept: FAMILY MEDICINE CLINIC | Facility: CLINIC | Age: 50
End: 2025-07-31

## 2025-07-31 VITALS
HEART RATE: 69 BPM | OXYGEN SATURATION: 100 % | DIASTOLIC BLOOD PRESSURE: 85 MMHG | SYSTOLIC BLOOD PRESSURE: 173 MMHG | RESPIRATION RATE: 16 BRPM

## 2025-07-31 DIAGNOSIS — E89.0 POST-SURGICAL HYPOTHYROIDISM: ICD-10-CM

## 2025-07-31 DIAGNOSIS — I10 ESSENTIAL HYPERTENSION: Primary | ICD-10-CM

## 2025-07-31 PROCEDURE — 99214 OFFICE O/P EST MOD 30 MIN: CPT | Performed by: FAMILY MEDICINE

## 2025-07-31 RX ORDER — LISINOPRIL 20 MG/1
20 TABLET ORAL DAILY
Qty: 30 TABLET | Refills: 0 | Status: SHIPPED | OUTPATIENT
Start: 2025-07-31

## 2025-07-31 RX ORDER — LEVOTHYROXINE SODIUM 125 UG/1
TABLET ORAL
Qty: 90 TABLET | Refills: 1 | Status: SHIPPED | OUTPATIENT
Start: 2025-07-31

## 2025-08-01 ENCOUNTER — TELEPHONE (OUTPATIENT)
Dept: FAMILY MEDICINE CLINIC | Facility: CLINIC | Age: 50
End: 2025-08-01

## 2025-08-20 ENCOUNTER — HOSPITAL ENCOUNTER (OUTPATIENT)
Dept: ULTRASOUND IMAGING | Facility: HOSPITAL | Age: 50
Discharge: HOME | End: 2025-08-20
Payer: COMMERCIAL

## 2025-08-20 DIAGNOSIS — Z90.89 S/P TOTAL THYROIDECTOMY: ICD-10-CM

## 2025-08-20 DIAGNOSIS — Z98.890 S/P TOTAL THYROIDECTOMY: ICD-10-CM

## 2025-08-20 PROCEDURE — 76536 US EXAM OF HEAD AND NECK: CPT

## (undated) DEVICE — TROCARS: Brand: KII® BALLOON BLUNT TIP SYSTEM

## (undated) DEVICE — TIBURON SPLIT SHEET: Brand: CONVERTORS

## (undated) DEVICE — ENDO STITCH DEVICE 10 MM

## (undated) DEVICE — SUT MONOCRYL 4-0 PS-2 27 IN Y426H

## (undated) DEVICE — SUT SILK 3-0 18 IN A184H

## (undated) DEVICE — SYRINGE 30ML LL

## (undated) DEVICE — ASTOUND STANDARD SURGICAL GOWN, XL: Brand: CONVERTORS

## (undated) DEVICE — DISPOSABLE OR TOWEL: Brand: CARDINAL HEALTH

## (undated) DEVICE — STAPLE RELOAD ENDO EGIA ARTICULATING MEDIUM TAN 2MM.5MM.3MM

## (undated) DEVICE — 10 MM BABCOCKS WITH RATCHET HANDLES: Brand: ENDOPATH

## (undated) DEVICE — SCD SEQUENTIAL COMPRESSION COMFORT SLEEVE MEDIUM KNEE LENGTH: Brand: KENDALL SCD

## (undated) DEVICE — TROCAR: Brand: KII FIOS FIRST ENTRY

## (undated) DEVICE — PROBE 8225101 5PK STD PRASS FL TIP ROHS

## (undated) DEVICE — 3M™ STERI-STRIP™ REINFORCED ADHESIVE SKIN CLOSURES, R1547, 1/2 IN X 4 IN (12 MM X 100 MM), 6 STRIPS/ENVELOPE: Brand: 3M™ STERI-STRIP™

## (undated) DEVICE — ENDO STITCH 2-0 VICRYL

## (undated) DEVICE — TUBING SUCTION 5MM X 12 FT

## (undated) DEVICE — CORD BIPOLAR 12FT REUSEABLE

## (undated) DEVICE — DECANTER: Brand: UNBRANDED

## (undated) DEVICE — ANTIBACTERIAL UNDYED BRAIDED (POLYGLACTIN 910), SYNTHETIC ABSORBABLE SUTURE: Brand: COATED VICRYL

## (undated) DEVICE — CIRCULAR MECH XL SEAL 25MM

## (undated) DEVICE — CHLORAPREP HI-LITE 26ML ORANGE

## (undated) DEVICE — ELECTRODE LAP SPATULA STR E-Z CLEAN 33CM -0018

## (undated) DEVICE — COVIDIEN ENDO GIA PURPLE (MED) RELOAD 45MM

## (undated) DEVICE — WEBRIL 6 IN UNSTERILE

## (undated) DEVICE — HEMOSTATIC MATRIX SURGIFLO 8ML W/THROMBIN

## (undated) DEVICE — ADHESIVE SKIN CLSR DERMABOND NX

## (undated) DEVICE — ENDOPATH 5MM CURVED SCISSORS WITH MONOPOLAR CAUTERY: Brand: ENDOPATH

## (undated) DEVICE — ENDOPATH PNEUMONEEDLE INSUFFLATION NEEDLES WITH LUER LOCK CONNECTORS 120MM: Brand: ENDOPATH

## (undated) DEVICE — GLOVE PI ULTRA TOUCH SZ.7.0

## (undated) DEVICE — HARMONIC 1100 SHEARS, 36CM SHAFT LENGTH: Brand: HARMONIC

## (undated) DEVICE — SYRINGE 10ML LL

## (undated) DEVICE — TISSUE RETRIEVAL SYSTEM: Brand: INZII RETRIEVAL SYSTEM

## (undated) DEVICE — BETADINE PREP STICKS

## (undated) DEVICE — TIBURON LAPAROSCOPIC ABDOMINAL DRAPE: Brand: CONVERTORS

## (undated) DEVICE — LIGACLIP 10-M/L, 10MM ENDOSCOPIC ROTATING MULTIPLE CLIP APPLIERS: Brand: LIGACLIP

## (undated) DEVICE — ALLENTOWN LAP CHOLE APP PACK: Brand: CARDINAL HEALTH

## (undated) DEVICE — SUT SILK 3-0 SH 30 IN K832H

## (undated) DEVICE — VISUALIZATION SYSTEM: Brand: CLEARIFY

## (undated) DEVICE — 3000CC GUARDIAN II: Brand: GUARDIAN

## (undated) DEVICE — [HIGH FLOW INSUFFLATOR,  DO NOT USE IF PACKAGE IS DAMAGED,  KEEP DRY,  KEEP AWAY FROM SUNLIGHT,  PROTECT FROM HEAT AND RADIOACTIVE SOURCES.]: Brand: PNEUMOSURE

## (undated) DEVICE — INTENDED FOR TISSUE SEPARATION, AND OTHER PROCEDURES THAT REQUIRE A SHARP SURGICAL BLADE TO PUNCTURE OR CUT.: Brand: BARD-PARKER SAFETY BLADES SIZE 15, STERILE

## (undated) DEVICE — NEEDLE SPINAL18G X 3.5 IN QUINCKE

## (undated) DEVICE — POWER SHELL SIGNIA

## (undated) DEVICE — BAG DECANTER

## (undated) DEVICE — PACK UNIVERSAL NECK

## (undated) DEVICE — TRAVELKIT CONTAINS FIRST STEP KIT (200ML EP-4 KIT) AND SOILED SCOPE BAG - 1 KIT: Brand: TRAVELKIT CONTAINS FIRST STEP KIT AND SOILED SCOPE BAG

## (undated) DEVICE — SYRINGE 20ML LL

## (undated) DEVICE — GLOVE SRG BIOGEL ORTHOPEDIC 7

## (undated) DEVICE — TUBING SMOKE EVAC W/FILTRATION DEVICE PLUMEPORT ACTIV

## (undated) DEVICE — 2000CC GUARDIAN II: Brand: GUARDIAN

## (undated) DEVICE — GLOVE INDICATOR PI UNDERGLOVE SZ 7 BLUE

## (undated) DEVICE — DRAPE EQUIPMENT RF WAND

## (undated) DEVICE — PLUMEPEN PRO 10FT

## (undated) DEVICE — PMI DISPOSABLE PUNCTURE CLOSURE DEVICE / SUTURE GRASPER: Brand: PMI

## (undated) DEVICE — VIOLET BRAIDED (POLYGLACTIN 910), SYNTHETIC ABSORBABLE SUTURE: Brand: COATED VICRYL

## (undated) DEVICE — GLOVE SRG BIOGEL 8

## (undated) DEVICE — LAPAROSCOPIC TROCAR SLEEVE/SINGLE USE: Brand: KII® SLEEVE

## (undated) DEVICE — URETERAL CATHETER ADAPTOR TIP

## (undated) DEVICE — ENDO STITCH 2-0 SURGIDAC 48 IN

## (undated) DEVICE — COVIDIEN ENDO GIA PURPLE (MED) RELOAD 60MM

## (undated) DEVICE — IRRIG ENDO FLO TUBING